# Patient Record
Sex: FEMALE | Race: WHITE | NOT HISPANIC OR LATINO
[De-identification: names, ages, dates, MRNs, and addresses within clinical notes are randomized per-mention and may not be internally consistent; named-entity substitution may affect disease eponyms.]

---

## 2017-10-23 ENCOUNTER — APPOINTMENT (OUTPATIENT)
Dept: ORTHOPEDIC SURGERY | Facility: CLINIC | Age: 67
End: 2017-10-23
Payer: MEDICARE

## 2017-10-23 PROCEDURE — 73502 X-RAY EXAM HIP UNI 2-3 VIEWS: CPT | Mod: LT

## 2017-10-23 PROCEDURE — 99214 OFFICE O/P EST MOD 30 MIN: CPT

## 2018-01-02 ENCOUNTER — APPOINTMENT (OUTPATIENT)
Dept: ORTHOPEDIC SURGERY | Facility: CLINIC | Age: 68
End: 2018-01-02
Payer: MEDICARE

## 2018-01-02 VITALS — WEIGHT: 150 LBS | HEIGHT: 65.5 IN | BODY MASS INDEX: 24.69 KG/M2

## 2018-01-02 DIAGNOSIS — G31.84 PARKINSON'S DISEASE: ICD-10-CM

## 2018-01-02 DIAGNOSIS — M25.559 PAIN IN UNSPECIFIED HIP: ICD-10-CM

## 2018-01-02 DIAGNOSIS — G20 PARKINSON'S DISEASE: ICD-10-CM

## 2018-01-02 PROCEDURE — 99213 OFFICE O/P EST LOW 20 MIN: CPT

## 2018-02-08 ENCOUNTER — OUTPATIENT (OUTPATIENT)
Dept: OUTPATIENT SERVICES | Facility: HOSPITAL | Age: 68
LOS: 1 days | End: 2018-02-08
Payer: MEDICARE

## 2018-02-08 ENCOUNTER — TRANSCRIPTION ENCOUNTER (OUTPATIENT)
Age: 68
End: 2018-02-08

## 2018-02-08 ENCOUNTER — RESULT REVIEW (OUTPATIENT)
Age: 68
End: 2018-02-08

## 2018-02-08 DIAGNOSIS — K94.20 GASTROSTOMY COMPLICATION, UNSPECIFIED: ICD-10-CM

## 2018-02-08 PROCEDURE — 88312 SPECIAL STAINS GROUP 1: CPT

## 2018-02-08 PROCEDURE — 43239 EGD BIOPSY SINGLE/MULTIPLE: CPT

## 2018-02-08 PROCEDURE — 88312 SPECIAL STAINS GROUP 1: CPT | Mod: 26

## 2018-02-08 PROCEDURE — 88305 TISSUE EXAM BY PATHOLOGIST: CPT | Mod: 26

## 2018-02-08 PROCEDURE — 88305 TISSUE EXAM BY PATHOLOGIST: CPT

## 2018-02-12 LAB — SURGICAL PATHOLOGY FINAL REPORT - CH: SIGNIFICANT CHANGE UP

## 2018-03-28 ENCOUNTER — APPOINTMENT (OUTPATIENT)
Dept: ORTHOPEDIC SURGERY | Facility: CLINIC | Age: 68
End: 2018-03-28
Payer: MEDICARE

## 2018-03-28 VITALS — HEIGHT: 65.5 IN | BODY MASS INDEX: 24.69 KG/M2 | WEIGHT: 150 LBS

## 2018-03-28 PROCEDURE — 99213 OFFICE O/P EST LOW 20 MIN: CPT

## 2018-05-07 ENCOUNTER — APPOINTMENT (OUTPATIENT)
Dept: ORTHOPEDIC SURGERY | Facility: CLINIC | Age: 68
End: 2018-05-07
Payer: MEDICARE

## 2018-05-07 PROCEDURE — 73502 X-RAY EXAM HIP UNI 2-3 VIEWS: CPT | Mod: LT

## 2018-05-07 PROCEDURE — 99213 OFFICE O/P EST LOW 20 MIN: CPT

## 2018-06-04 ENCOUNTER — OUTPATIENT (OUTPATIENT)
Dept: OUTPATIENT SERVICES | Facility: HOSPITAL | Age: 68
LOS: 1 days | End: 2018-06-04
Payer: MEDICARE

## 2018-06-04 VITALS
OXYGEN SATURATION: 96 % | DIASTOLIC BLOOD PRESSURE: 74 MMHG | TEMPERATURE: 99 F | HEART RATE: 83 BPM | SYSTOLIC BLOOD PRESSURE: 139 MMHG | HEIGHT: 65 IN | RESPIRATION RATE: 15 BRPM | WEIGHT: 160.06 LBS

## 2018-06-04 DIAGNOSIS — Z96.89 PRESENCE OF OTHER SPECIFIED FUNCTIONAL IMPLANTS: Chronic | ICD-10-CM

## 2018-06-04 DIAGNOSIS — Z01.818 ENCOUNTER FOR OTHER PREPROCEDURAL EXAMINATION: ICD-10-CM

## 2018-06-04 DIAGNOSIS — Z98.890 OTHER SPECIFIED POSTPROCEDURAL STATES: Chronic | ICD-10-CM

## 2018-06-04 DIAGNOSIS — G20 PARKINSON'S DISEASE: ICD-10-CM

## 2018-06-04 DIAGNOSIS — M16.12 UNILATERAL PRIMARY OSTEOARTHRITIS, LEFT HIP: ICD-10-CM

## 2018-06-04 DIAGNOSIS — Z29.9 ENCOUNTER FOR PROPHYLACTIC MEASURES, UNSPECIFIED: ICD-10-CM

## 2018-06-04 DIAGNOSIS — C44.91 BASAL CELL CARCINOMA OF SKIN, UNSPECIFIED: Chronic | ICD-10-CM

## 2018-06-04 DIAGNOSIS — Z98.891 HISTORY OF UTERINE SCAR FROM PREVIOUS SURGERY: Chronic | ICD-10-CM

## 2018-06-04 DIAGNOSIS — K46.9 UNSPECIFIED ABDOMINAL HERNIA WITHOUT OBSTRUCTION OR GANGRENE: Chronic | ICD-10-CM

## 2018-06-04 LAB
ANION GAP SERPL CALC-SCNC: 14 MMOL/L — SIGNIFICANT CHANGE UP (ref 5–17)
BLD GP AB SCN SERPL QL: NEGATIVE — SIGNIFICANT CHANGE UP
BUN SERPL-MCNC: 14 MG/DL — SIGNIFICANT CHANGE UP (ref 7–23)
CALCIUM SERPL-MCNC: 9.3 MG/DL — SIGNIFICANT CHANGE UP (ref 8.4–10.5)
CHLORIDE SERPL-SCNC: 103 MMOL/L — SIGNIFICANT CHANGE UP (ref 96–108)
CO2 SERPL-SCNC: 23 MMOL/L — SIGNIFICANT CHANGE UP (ref 22–31)
CREAT SERPL-MCNC: 0.38 MG/DL — LOW (ref 0.5–1.3)
GLUCOSE SERPL-MCNC: 94 MG/DL — SIGNIFICANT CHANGE UP (ref 70–99)
HBA1C BLD-MCNC: 5.7 % — HIGH (ref 4–5.6)
HCT VFR BLD CALC: 41.9 % — SIGNIFICANT CHANGE UP (ref 34.5–45)
HGB BLD-MCNC: 13.4 G/DL — SIGNIFICANT CHANGE UP (ref 11.5–15.5)
MCHC RBC-ENTMCNC: 26.5 PG — LOW (ref 27–34)
MCHC RBC-ENTMCNC: 32 GM/DL — SIGNIFICANT CHANGE UP (ref 32–36)
MCV RBC AUTO: 82.8 FL — SIGNIFICANT CHANGE UP (ref 80–100)
MRSA PCR RESULT.: SIGNIFICANT CHANGE UP
PLATELET # BLD AUTO: 254 K/UL — SIGNIFICANT CHANGE UP (ref 150–400)
POTASSIUM SERPL-MCNC: 3.9 MMOL/L — SIGNIFICANT CHANGE UP (ref 3.5–5.3)
POTASSIUM SERPL-SCNC: 3.9 MMOL/L — SIGNIFICANT CHANGE UP (ref 3.5–5.3)
RBC # BLD: 5.06 M/UL — SIGNIFICANT CHANGE UP (ref 3.8–5.2)
RBC # FLD: 15.3 % — HIGH (ref 10.3–14.5)
RH IG SCN BLD-IMP: POSITIVE — SIGNIFICANT CHANGE UP
S AUREUS DNA NOSE QL NAA+PROBE: SIGNIFICANT CHANGE UP
SODIUM SERPL-SCNC: 140 MMOL/L — SIGNIFICANT CHANGE UP (ref 135–145)
WBC # BLD: 7.7 K/UL — SIGNIFICANT CHANGE UP (ref 3.8–10.5)
WBC # FLD AUTO: 7.7 K/UL — SIGNIFICANT CHANGE UP (ref 3.8–10.5)

## 2018-06-04 PROCEDURE — G0463: CPT

## 2018-06-04 PROCEDURE — 86901 BLOOD TYPING SEROLOGIC RH(D): CPT

## 2018-06-04 PROCEDURE — 83036 HEMOGLOBIN GLYCOSYLATED A1C: CPT

## 2018-06-04 PROCEDURE — 86900 BLOOD TYPING SEROLOGIC ABO: CPT

## 2018-06-04 PROCEDURE — 80048 BASIC METABOLIC PNL TOTAL CA: CPT

## 2018-06-04 PROCEDURE — 85027 COMPLETE CBC AUTOMATED: CPT

## 2018-06-04 PROCEDURE — 87640 STAPH A DNA AMP PROBE: CPT

## 2018-06-04 PROCEDURE — 86850 RBC ANTIBODY SCREEN: CPT

## 2018-06-04 PROCEDURE — 87641 MR-STAPH DNA AMP PROBE: CPT

## 2018-06-04 RX ORDER — PANTOPRAZOLE SODIUM 20 MG/1
40 TABLET, DELAYED RELEASE ORAL ONCE
Qty: 0 | Refills: 0 | Status: COMPLETED | OUTPATIENT
Start: 2018-06-21 | End: 2018-06-21

## 2018-06-04 RX ORDER — TRAMADOL HYDROCHLORIDE 50 MG/1
50 TABLET ORAL ONCE
Qty: 0 | Refills: 0 | Status: DISCONTINUED | OUTPATIENT
Start: 2018-06-21 | End: 2018-06-21

## 2018-06-04 RX ORDER — SODIUM CHLORIDE 9 MG/ML
3 INJECTION INTRAMUSCULAR; INTRAVENOUS; SUBCUTANEOUS EVERY 8 HOURS
Qty: 0 | Refills: 0 | Status: DISCONTINUED | OUTPATIENT
Start: 2018-06-21 | End: 2018-06-21

## 2018-06-04 RX ORDER — ACETAMINOPHEN 500 MG
975 TABLET ORAL ONCE
Qty: 0 | Refills: 0 | Status: COMPLETED | OUTPATIENT
Start: 2018-06-21 | End: 2018-06-21

## 2018-06-04 RX ORDER — LIDOCAINE HCL 20 MG/ML
0.2 VIAL (ML) INJECTION ONCE
Qty: 0 | Refills: 0 | Status: DISCONTINUED | OUTPATIENT
Start: 2018-06-21 | End: 2018-06-21

## 2018-06-04 RX ORDER — VANCOMYCIN HCL 1 G
1000 VIAL (EA) INTRAVENOUS ONCE
Qty: 0 | Refills: 0 | Status: DISCONTINUED | OUTPATIENT
Start: 2018-06-21 | End: 2018-06-25

## 2018-06-04 RX ORDER — GABAPENTIN 400 MG/1
100 CAPSULE ORAL ONCE
Qty: 0 | Refills: 0 | Status: COMPLETED | OUTPATIENT
Start: 2018-06-21 | End: 2018-06-21

## 2018-06-04 NOTE — H&P PST ADULT - PROBLEM SELECTOR PLAN 2
Pt has Duopa medication with continuous infusion via J-tube at 2.4ml/hr. Pt reports taking bolus of 5.3ml in am. Pt instructed to take remote for DBS on DOS. Pt has an intra-abdominal Duopa pump with continuous infusion via J-tube at 2.4ml/hr. Pt reports taking bolus of 5.3ml in am. Pt instructed to take remote for DBS on DOS.

## 2018-06-04 NOTE — H&P PST ADULT - ASSESSMENT
CAPRINI SCORE [CLOT]    AGE RELATED RISK FACTORS                                                       MOBILITY RELATED FACTORS  [ ] Age 41-60 years                                            (1 Point)                  [ ] Bed rest                                                        (1 Point)  [x] Age: 61-74 years                                           (2 Points)                 [ ] Plaster cast                                                   (2 Points)  [ ] Age= 75 years                                              (3 Points)                 [ ] Bed bound for more than 72 hours                 (2 Points)    DISEASE RELATED RISK FACTORS                                               GENDER SPECIFIC FACTORS  [ ] Edema in the lower extremities                       (1 Point)                  [ ] Pregnancy                                                     (1 Point)  [ ] Varicose veins                                               (1 Point)                  [ ] Post-partum < 6 weeks                                   (1 Point)             [x] BMI > 25 Kg/m2                                            (1 Point)                  [ ] Hormonal therapy  or oral contraception          (1 Point)                 [ ] Sepsis (in the previous month)                        (1 Point)                  [ ] History of pregnancy complications                 (1 point)  [ ] Pneumonia or serious lung disease                                               [ ] Unexplained or recurrent                     (1 Point)           (in the previous month)                               (1 Point)  [ ] Abnormal pulmonary function test                     (1 Point)                 SURGERY RELATED RISK FACTORS  [ ] Acute myocardial infarction                              (1 Point)                 [ ]  Section                                             (1 Point)  [ ] Congestive heart failure (in the previous month)  (1 Point)               [ ] Minor surgery                                                  (1 Point)   [ ] Inflammatory bowel disease                             (1 Point)                 [ ] Arthroscopic surgery                                        (2 Points)  [ ] Central venous access                                      (2 Points)                [x] General surgery lasting more than 45 minutes   (2 Points)       [ ] Stroke (in the previous month)                          (5 Points)               [ ] Elective arthroplasty                                         (5 Points)                                                                                                                                               HEMATOLOGY RELATED FACTORS                                                 TRAUMA RELATED RISK FACTORS  [ ] Prior episodes of VTE                                     (3 Points)                 [ ] Fracture of the hip, pelvis, or leg                       (5 Points)  [ ] Positive family history for VTE                         (3 Points)                 [ ] Acute spinal cord injury (in the previous month)  (5 Points)  [ ] Prothrombin 18143 A                                     (3 Points)                 [ ] Paralysis  (less than 1 month)                             (5 Points)  [ ] Factor V Leiden                                             (3 Points)                  [ ] Multiple Trauma within 1 month                        (5 Points)  [ ] Lupus anticoagulants                                     (3 Points)                                                           [ ] Anticardiolipin antibodies                               (3 Points)                                                       [ ] High homocysteine in the blood                      (3 Points)                                             [ ] Other congenital or acquired thrombophilia      (3 Points)                                                [ ] Heparin induced thrombocytopenia                  (3 Points)                                          Total Score [    5     ]

## 2018-06-04 NOTE — H&P PST ADULT - ACTIVITY
able to do some light housework, limited due to hip and leg pain, uses walker at home & wheelchair when out

## 2018-06-04 NOTE — H&P PST ADULT - PROBLEM SELECTOR PLAN 1
Scheduled left anterior total hip replacement on 6/21/2018  CBC, BMP, T&S, HgA1c, nasal swab sent at [facility]  Pre-op instructions given to pt/spouse including chlorhexidine soap

## 2018-06-04 NOTE — H&P PST ADULT - PMH
Basal cell carcinoma  scalp  Constipation    Depression    GERD (gastroesophageal reflux disease)    Parkinson's disease    Seasonal allergies    Unilateral primary osteoarthritis, left hip

## 2018-06-04 NOTE — H&P PST ADULT - HISTORY OF PRESENT ILLNESS
67 y/o female with PMHx of Parkinson's disease, 69 y/o female with PMHx of Parkinson's disease (Duopa infusing via J-tube), s/p DBS x3, GERD, depression c/o worsening left hip and thigh pain. Reports instability as she ambulates and requires a walker at home and a wheelchair out of the house. Reports trying medications without much relief. Evaluated by Dr. Stokes, s/p diagnostic testing reveals OA of left hip. Presents to PST for a scheduled LEFT anterior total hip replacement on 6/21/2018.

## 2018-06-04 NOTE — H&P PST ADULT - NSANTHOSAYNRD_GEN_A_CORE
No. JUDAH screening performed.  STOP BANG Legend: 0-2 = LOW Risk; 3-4 = INTERMEDIATE Risk; 5-8 = HIGH Risk

## 2018-06-04 NOTE — H&P PST ADULT - PSH
Abdominal hernia  with mesh, 2003  Basal cell carcinoma  removal, mid vertex scalp, 2002  H/O colonoscopy  , 2008 polypectomy, ,   H/O ovarian cystectomy  right,   History of   10/1982  S/P deep brain stimulator placement  2006, 2 brain pacemakers Model #[id number], batteries replaced 2014

## 2018-06-04 NOTE — H&P PST ADULT - PROBLEM SELECTOR PLAN 3
The Caprini score indicates this patient is at risk for a VTE event (score 3-5).  Most surgical patients in this group would benefit from pharmacologic prophylaxis. The surgical team will determine the balance between VTE risk and bleeding risk

## 2018-06-20 ENCOUNTER — TRANSCRIPTION ENCOUNTER (OUTPATIENT)
Age: 68
End: 2018-06-20

## 2018-06-21 ENCOUNTER — INPATIENT (INPATIENT)
Facility: HOSPITAL | Age: 68
LOS: 3 days | Discharge: HOME | DRG: 470 | End: 2018-06-25
Attending: ORTHOPAEDIC SURGERY | Admitting: ORTHOPAEDIC SURGERY
Payer: MEDICARE

## 2018-06-21 ENCOUNTER — APPOINTMENT (OUTPATIENT)
Dept: ORTHOPEDIC SURGERY | Facility: HOSPITAL | Age: 68
End: 2018-06-21

## 2018-06-21 VITALS
TEMPERATURE: 98 F | HEART RATE: 82 BPM | SYSTOLIC BLOOD PRESSURE: 123 MMHG | RESPIRATION RATE: 18 BRPM | OXYGEN SATURATION: 98 % | WEIGHT: 160.06 LBS | DIASTOLIC BLOOD PRESSURE: 88 MMHG | HEIGHT: 65 IN

## 2018-06-21 DIAGNOSIS — Z96.89 PRESENCE OF OTHER SPECIFIED FUNCTIONAL IMPLANTS: Chronic | ICD-10-CM

## 2018-06-21 DIAGNOSIS — Z98.891 HISTORY OF UTERINE SCAR FROM PREVIOUS SURGERY: Chronic | ICD-10-CM

## 2018-06-21 DIAGNOSIS — M16.12 UNILATERAL PRIMARY OSTEOARTHRITIS, LEFT HIP: ICD-10-CM

## 2018-06-21 DIAGNOSIS — Z98.890 OTHER SPECIFIED POSTPROCEDURAL STATES: Chronic | ICD-10-CM

## 2018-06-21 DIAGNOSIS — K46.9 UNSPECIFIED ABDOMINAL HERNIA WITHOUT OBSTRUCTION OR GANGRENE: Chronic | ICD-10-CM

## 2018-06-21 DIAGNOSIS — C44.91 BASAL CELL CARCINOMA OF SKIN, UNSPECIFIED: Chronic | ICD-10-CM

## 2018-06-21 LAB
HCT VFR BLD CALC: 36 % — SIGNIFICANT CHANGE UP (ref 34.5–45)
HGB BLD-MCNC: 11.9 G/DL — SIGNIFICANT CHANGE UP (ref 11.5–15.5)
MCHC RBC-ENTMCNC: 27.7 PG — SIGNIFICANT CHANGE UP (ref 27–34)
MCHC RBC-ENTMCNC: 33.2 GM/DL — SIGNIFICANT CHANGE UP (ref 32–36)
MCV RBC AUTO: 83.4 FL — SIGNIFICANT CHANGE UP (ref 80–100)
PLATELET # BLD AUTO: 215 K/UL — SIGNIFICANT CHANGE UP (ref 150–400)
RBC # BLD: 4.31 M/UL — SIGNIFICANT CHANGE UP (ref 3.8–5.2)
RBC # FLD: 13.8 % — SIGNIFICANT CHANGE UP (ref 10.3–14.5)
WBC # BLD: 13.2 K/UL — HIGH (ref 3.8–10.5)
WBC # FLD AUTO: 13.2 K/UL — HIGH (ref 3.8–10.5)

## 2018-06-21 PROCEDURE — 72170 X-RAY EXAM OF PELVIS: CPT | Mod: 26

## 2018-06-21 PROCEDURE — 27130 TOTAL HIP ARTHROPLASTY: CPT | Mod: LT

## 2018-06-21 RX ORDER — PANTOPRAZOLE SODIUM 20 MG/1
40 TABLET, DELAYED RELEASE ORAL DAILY
Qty: 0 | Refills: 0 | Status: DISCONTINUED | OUTPATIENT
Start: 2018-06-21 | End: 2018-06-25

## 2018-06-21 RX ORDER — POLYETHYLENE GLYCOL 3350 17 G/17G
17 POWDER, FOR SOLUTION ORAL DAILY
Qty: 0 | Refills: 0 | Status: DISCONTINUED | OUTPATIENT
Start: 2018-06-21 | End: 2018-06-25

## 2018-06-21 RX ORDER — CELECOXIB 200 MG/1
200 CAPSULE ORAL AT BEDTIME
Qty: 0 | Refills: 0 | Status: DISCONTINUED | OUTPATIENT
Start: 2018-06-23 | End: 2018-06-25

## 2018-06-21 RX ORDER — OXYCODONE HYDROCHLORIDE 5 MG/1
5 TABLET ORAL EVERY 4 HOURS
Qty: 0 | Refills: 0 | Status: DISCONTINUED | OUTPATIENT
Start: 2018-06-21 | End: 2018-06-25

## 2018-06-21 RX ORDER — MAGNESIUM HYDROXIDE 400 MG/1
30 TABLET, CHEWABLE ORAL DAILY
Qty: 0 | Refills: 0 | Status: DISCONTINUED | OUTPATIENT
Start: 2018-06-21 | End: 2018-06-25

## 2018-06-21 RX ORDER — SODIUM CHLORIDE 9 MG/ML
500 INJECTION, SOLUTION INTRAVENOUS ONCE
Qty: 0 | Refills: 0 | Status: COMPLETED | OUTPATIENT
Start: 2018-06-22 | End: 2018-06-22

## 2018-06-21 RX ORDER — ACETAMINOPHEN 500 MG
975 TABLET ORAL EVERY 8 HOURS
Qty: 0 | Refills: 0 | Status: DISCONTINUED | OUTPATIENT
Start: 2018-06-21 | End: 2018-06-25

## 2018-06-21 RX ORDER — HYDROMORPHONE HYDROCHLORIDE 2 MG/ML
0.2 INJECTION INTRAMUSCULAR; INTRAVENOUS; SUBCUTANEOUS
Qty: 0 | Refills: 0 | Status: DISCONTINUED | OUTPATIENT
Start: 2018-06-21 | End: 2018-06-25

## 2018-06-21 RX ORDER — ONDANSETRON 8 MG/1
4 TABLET, FILM COATED ORAL ONCE
Qty: 0 | Refills: 0 | Status: DISCONTINUED | OUTPATIENT
Start: 2018-06-21 | End: 2018-06-21

## 2018-06-21 RX ORDER — CEFAZOLIN SODIUM 1 G
2000 VIAL (EA) INJECTION EVERY 8 HOURS
Qty: 0 | Refills: 0 | Status: COMPLETED | OUTPATIENT
Start: 2018-06-21 | End: 2018-06-22

## 2018-06-21 RX ORDER — OXYCODONE HYDROCHLORIDE 5 MG/1
10 TABLET ORAL EVERY 4 HOURS
Qty: 0 | Refills: 0 | Status: DISCONTINUED | OUTPATIENT
Start: 2018-06-21 | End: 2018-06-25

## 2018-06-21 RX ORDER — DOCUSATE SODIUM 100 MG
100 CAPSULE ORAL THREE TIMES A DAY
Qty: 0 | Refills: 0 | Status: DISCONTINUED | OUTPATIENT
Start: 2018-06-21 | End: 2018-06-24

## 2018-06-21 RX ORDER — KETOROLAC TROMETHAMINE 30 MG/ML
30 SYRINGE (ML) INJECTION EVERY 8 HOURS
Qty: 0 | Refills: 0 | Status: DISCONTINUED | OUTPATIENT
Start: 2018-06-21 | End: 2018-06-23

## 2018-06-21 RX ORDER — DEXAMETHASONE 0.5 MG/5ML
8 ELIXIR ORAL ONCE
Qty: 0 | Refills: 0 | Status: COMPLETED | OUTPATIENT
Start: 2018-06-22 | End: 2018-06-22

## 2018-06-21 RX ORDER — SENNA PLUS 8.6 MG/1
2 TABLET ORAL AT BEDTIME
Qty: 0 | Refills: 0 | Status: DISCONTINUED | OUTPATIENT
Start: 2018-06-21 | End: 2018-06-24

## 2018-06-21 RX ORDER — SODIUM CHLORIDE 9 MG/ML
500 INJECTION INTRAMUSCULAR; INTRAVENOUS; SUBCUTANEOUS ONCE
Qty: 0 | Refills: 0 | Status: DISCONTINUED | OUTPATIENT
Start: 2018-06-21 | End: 2018-06-21

## 2018-06-21 RX ORDER — TRAMADOL HYDROCHLORIDE 50 MG/1
50 TABLET ORAL EVERY 6 HOURS
Qty: 0 | Refills: 0 | Status: DISCONTINUED | OUTPATIENT
Start: 2018-06-21 | End: 2018-06-25

## 2018-06-21 RX ORDER — ONDANSETRON 8 MG/1
4 TABLET, FILM COATED ORAL ONCE
Qty: 0 | Refills: 0 | Status: DISCONTINUED | OUTPATIENT
Start: 2018-06-21 | End: 2018-06-25

## 2018-06-21 RX ORDER — HYDROMORPHONE HYDROCHLORIDE 2 MG/ML
0.2 INJECTION INTRAMUSCULAR; INTRAVENOUS; SUBCUTANEOUS
Qty: 0 | Refills: 0 | Status: DISCONTINUED | OUTPATIENT
Start: 2018-06-21 | End: 2018-06-21

## 2018-06-21 RX ORDER — SODIUM CHLORIDE 9 MG/ML
500 INJECTION, SOLUTION INTRAVENOUS ONCE
Qty: 0 | Refills: 0 | Status: COMPLETED | OUTPATIENT
Start: 2018-06-21 | End: 2018-06-21

## 2018-06-21 RX ORDER — ONDANSETRON 8 MG/1
4 TABLET, FILM COATED ORAL EVERY 6 HOURS
Qty: 0 | Refills: 0 | Status: DISCONTINUED | OUTPATIENT
Start: 2018-06-21 | End: 2018-06-25

## 2018-06-21 RX ORDER — ASPIRIN/CALCIUM CARB/MAGNESIUM 324 MG
325 TABLET ORAL
Qty: 0 | Refills: 0 | Status: DISCONTINUED | OUTPATIENT
Start: 2018-06-21 | End: 2018-06-25

## 2018-06-21 RX ORDER — CARBIDOPA AND LEVODOPA 25; 100 MG/1; MG/1
1 TABLET ORAL THREE TIMES A DAY
Qty: 0 | Refills: 0 | Status: DISCONTINUED | OUTPATIENT
Start: 2018-06-21 | End: 2018-06-25

## 2018-06-21 RX ORDER — SERTRALINE 25 MG/1
200 TABLET, FILM COATED ORAL DAILY
Qty: 0 | Refills: 0 | Status: DISCONTINUED | OUTPATIENT
Start: 2018-06-21 | End: 2018-06-25

## 2018-06-21 RX ORDER — SODIUM CHLORIDE 9 MG/ML
1000 INJECTION, SOLUTION INTRAVENOUS
Qty: 0 | Refills: 0 | Status: DISCONTINUED | OUTPATIENT
Start: 2018-06-21 | End: 2018-06-23

## 2018-06-21 RX ORDER — ALBUTEROL 90 UG/1
2 AEROSOL, METERED ORAL EVERY 6 HOURS
Qty: 0 | Refills: 0 | Status: DISCONTINUED | OUTPATIENT
Start: 2018-06-21 | End: 2018-06-25

## 2018-06-21 RX ORDER — MIRTAZAPINE 45 MG/1
30 TABLET, ORALLY DISINTEGRATING ORAL AT BEDTIME
Qty: 0 | Refills: 0 | Status: DISCONTINUED | OUTPATIENT
Start: 2018-06-21 | End: 2018-06-25

## 2018-06-21 RX ORDER — SODIUM CHLORIDE 9 MG/ML
1000 INJECTION, SOLUTION INTRAVENOUS ONCE
Qty: 0 | Refills: 0 | Status: COMPLETED | OUTPATIENT
Start: 2018-06-21 | End: 2018-06-21

## 2018-06-21 RX ORDER — ACETAMINOPHEN 500 MG
650 TABLET ORAL EVERY 6 HOURS
Qty: 0 | Refills: 0 | Status: DISCONTINUED | OUTPATIENT
Start: 2018-06-21 | End: 2018-06-25

## 2018-06-21 RX ORDER — ACETAMINOPHEN 500 MG
1000 TABLET ORAL ONCE
Qty: 0 | Refills: 0 | Status: DISCONTINUED | OUTPATIENT
Start: 2018-06-21 | End: 2018-06-25

## 2018-06-21 RX ADMIN — Medication 100 MILLIGRAM(S): at 22:31

## 2018-06-21 RX ADMIN — GABAPENTIN 100 MILLIGRAM(S): 400 CAPSULE ORAL at 10:40

## 2018-06-21 RX ADMIN — SODIUM CHLORIDE 125 MILLILITER(S): 9 INJECTION, SOLUTION INTRAVENOUS at 17:30

## 2018-06-21 RX ADMIN — PANTOPRAZOLE SODIUM 40 MILLIGRAM(S): 20 TABLET, DELAYED RELEASE ORAL at 10:40

## 2018-06-21 RX ADMIN — Medication 30 MILLIGRAM(S): at 22:31

## 2018-06-21 RX ADMIN — OXYCODONE HYDROCHLORIDE 5 MILLIGRAM(S): 5 TABLET ORAL at 17:59

## 2018-06-21 RX ADMIN — Medication 325 MILLIGRAM(S): at 19:30

## 2018-06-21 RX ADMIN — Medication 975 MILLIGRAM(S): at 10:40

## 2018-06-21 RX ADMIN — Medication 975 MILLIGRAM(S): at 11:20

## 2018-06-21 RX ADMIN — SODIUM CHLORIDE 1000 MILLILITER(S): 9 INJECTION, SOLUTION INTRAVENOUS at 20:57

## 2018-06-21 RX ADMIN — SODIUM CHLORIDE 1000 MILLILITER(S): 9 INJECTION, SOLUTION INTRAVENOUS at 17:06

## 2018-06-21 RX ADMIN — MIRTAZAPINE 30 MILLIGRAM(S): 45 TABLET, ORALLY DISINTEGRATING ORAL at 22:31

## 2018-06-21 RX ADMIN — OXYCODONE HYDROCHLORIDE 5 MILLIGRAM(S): 5 TABLET ORAL at 18:29

## 2018-06-21 RX ADMIN — TRAMADOL HYDROCHLORIDE 50 MILLIGRAM(S): 50 TABLET ORAL at 11:37

## 2018-06-21 NOTE — CHART NOTE - NSCHARTNOTEFT_GEN_A_CORE
06-21-18 @ 16:38    Pt comfortable.  Pain well controlled.  No chest pain, no SOB, no N/V.    T(C): 36 (06-21-18 @ 15:55), Max: 36.5 (06-21-18 @ 09:52)  HR: 92 (06-21-18 @ 16:30) (82 - 99)  BP: 108/55 (06-21-18 @ 16:30) (94/66 - 123/88)  RR: 15 (06-21-18 @ 16:30) (15 - 18)  SpO2: 92% (06-21-18 @ 16:30) (90% - 98%)    Left hip dressing clean/dry/intact.  +DF/PF.  +Distal Pulses.   Motor/Sensory function grossly intact.  Calves soft/NT with venodynes  intact.      Postop xrays show good hardware alignment.    Pt s/p L SLIME  -Analgesia  -Cont to Monitor  -DVT Prophylaxis - Aspirin    JB Dowell PA-C  Orthopaedic Surgery  [id number] 06-21-18 @ 16:38    Pt comfortable.  Pain well controlled.  No chest pain, no SOB, no N/V.    T(C): 36 (06-21-18 @ 15:55), Max: 36.5 (06-21-18 @ 09:52)  HR: 92 (06-21-18 @ 16:30) (82 - 99)  BP: 108/55 (06-21-18 @ 16:30) (94/66 - 123/88)  RR: 15 (06-21-18 @ 16:30) (15 - 18)  SpO2: 92% (06-21-18 @ 16:30) (90% - 98%)    Left hip dressing clean/dry/intact.  +DF/PF.  +Distal Pulses.   Motor/Sensory function grossly intact.  Calves soft/NT with venodynes  intact.      Postop xrays show good hardware alignment.    Pt s/p L SLIME  -Analgesia  -Cont to Monitor  -DVT Prophylaxis - Aspirin    JB Dowell PA-C  Orthopaedic Surgery  [id number]    I agree with the above note and have personally seen and examined this patient. All pertinent films have been reviewed. Please refer to clinical documentation of the history, physical examinations, data summary, and both assessment and plan as documented above and with which I agree.    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

## 2018-06-21 NOTE — BRIEF OPERATIVE NOTE - PROCEDURE
<<-----Click on this checkbox to enter Procedure Total hip arthroplasty by direct anterior approach  06/21/2018    Active  [facility]

## 2018-06-21 NOTE — PATIENT PROFILE ADULT. - PSH
Abdominal hernia  with mesh, 2003  Basal cell carcinoma  removal, mid vertex scalp, 2002  H/O colonoscopy  , 2008 polypectomy, ,   H/O ovarian cystectomy  right,   History of   10/1982  S/P deep brain stimulator placement  2006, 2 brain pacemakers Model #[id number], batteries replaced 2014

## 2018-06-22 ENCOUNTER — TRANSCRIPTION ENCOUNTER (OUTPATIENT)
Age: 68
End: 2018-06-22

## 2018-06-22 LAB
ANION GAP SERPL CALC-SCNC: 11 MMOL/L — SIGNIFICANT CHANGE UP (ref 5–17)
BUN SERPL-MCNC: 16 MG/DL — SIGNIFICANT CHANGE UP (ref 7–23)
CALCIUM SERPL-MCNC: 7.8 MG/DL — LOW (ref 8.4–10.5)
CHLORIDE SERPL-SCNC: 104 MMOL/L — SIGNIFICANT CHANGE UP (ref 96–108)
CO2 SERPL-SCNC: 24 MMOL/L — SIGNIFICANT CHANGE UP (ref 22–31)
CREAT SERPL-MCNC: 0.49 MG/DL — LOW (ref 0.5–1.3)
GLUCOSE SERPL-MCNC: 130 MG/DL — HIGH (ref 70–99)
HCT VFR BLD CALC: 27 % — LOW (ref 34.5–45)
HCT VFR BLD CALC: 29.3 % — LOW (ref 34.5–45)
HGB BLD-MCNC: 8.8 G/DL — LOW (ref 11.5–15.5)
HGB BLD-MCNC: 9.8 G/DL — LOW (ref 11.5–15.5)
MCHC RBC-ENTMCNC: 27 PG — SIGNIFICANT CHANGE UP (ref 27–34)
MCHC RBC-ENTMCNC: 28 PG — SIGNIFICANT CHANGE UP (ref 27–34)
MCHC RBC-ENTMCNC: 32.6 GM/DL — SIGNIFICANT CHANGE UP (ref 32–36)
MCHC RBC-ENTMCNC: 33.5 GM/DL — SIGNIFICANT CHANGE UP (ref 32–36)
MCV RBC AUTO: 82.8 FL — SIGNIFICANT CHANGE UP (ref 80–100)
MCV RBC AUTO: 83.4 FL — SIGNIFICANT CHANGE UP (ref 80–100)
PLATELET # BLD AUTO: 172 K/UL — SIGNIFICANT CHANGE UP (ref 150–400)
PLATELET # BLD AUTO: 192 K/UL — SIGNIFICANT CHANGE UP (ref 150–400)
POTASSIUM SERPL-MCNC: 3.7 MMOL/L — SIGNIFICANT CHANGE UP (ref 3.5–5.3)
POTASSIUM SERPL-SCNC: 3.7 MMOL/L — SIGNIFICANT CHANGE UP (ref 3.5–5.3)
RBC # BLD: 3.26 M/UL — LOW (ref 3.8–5.2)
RBC # BLD: 3.52 M/UL — LOW (ref 3.8–5.2)
RBC # FLD: 13.7 % — SIGNIFICANT CHANGE UP (ref 10.3–14.5)
RBC # FLD: 15.7 % — HIGH (ref 10.3–14.5)
SODIUM SERPL-SCNC: 139 MMOL/L — SIGNIFICANT CHANGE UP (ref 135–145)
WBC # BLD: 6.81 K/UL — SIGNIFICANT CHANGE UP (ref 3.8–10.5)
WBC # BLD: 8.3 K/UL — SIGNIFICANT CHANGE UP (ref 3.8–10.5)
WBC # FLD AUTO: 6.81 K/UL — SIGNIFICANT CHANGE UP (ref 3.8–10.5)
WBC # FLD AUTO: 8.3 K/UL — SIGNIFICANT CHANGE UP (ref 3.8–10.5)

## 2018-06-22 RX ORDER — SODIUM CHLORIDE 9 MG/ML
1000 INJECTION INTRAMUSCULAR; INTRAVENOUS; SUBCUTANEOUS ONCE
Qty: 0 | Refills: 0 | Status: COMPLETED | OUTPATIENT
Start: 2018-06-22 | End: 2018-06-22

## 2018-06-22 RX ADMIN — TRAMADOL HYDROCHLORIDE 50 MILLIGRAM(S): 50 TABLET ORAL at 10:00

## 2018-06-22 RX ADMIN — MIRTAZAPINE 30 MILLIGRAM(S): 45 TABLET, ORALLY DISINTEGRATING ORAL at 22:03

## 2018-06-22 RX ADMIN — SERTRALINE 200 MILLIGRAM(S): 25 TABLET, FILM COATED ORAL at 12:06

## 2018-06-22 RX ADMIN — Medication 30 MILLIGRAM(S): at 17:00

## 2018-06-22 RX ADMIN — Medication 100 MILLIGRAM(S): at 09:05

## 2018-06-22 RX ADMIN — PANTOPRAZOLE SODIUM 40 MILLIGRAM(S): 20 TABLET, DELAYED RELEASE ORAL at 12:06

## 2018-06-22 RX ADMIN — Medication 100 MILLIGRAM(S): at 06:29

## 2018-06-22 RX ADMIN — Medication 100 MILLIGRAM(S): at 16:12

## 2018-06-22 RX ADMIN — Medication 30 MILLIGRAM(S): at 06:29

## 2018-06-22 RX ADMIN — Medication 30 MILLIGRAM(S): at 16:12

## 2018-06-22 RX ADMIN — SODIUM CHLORIDE 2000 MILLILITER(S): 9 INJECTION INTRAMUSCULAR; INTRAVENOUS; SUBCUTANEOUS at 02:37

## 2018-06-22 RX ADMIN — Medication 0.5 MILLIGRAM(S): at 12:13

## 2018-06-22 RX ADMIN — Medication 325 MILLIGRAM(S): at 06:29

## 2018-06-22 RX ADMIN — Medication 101.6 MILLIGRAM(S): at 06:30

## 2018-06-22 RX ADMIN — Medication 30 MILLIGRAM(S): at 22:02

## 2018-06-22 RX ADMIN — Medication 100 MILLIGRAM(S): at 22:03

## 2018-06-22 RX ADMIN — SODIUM CHLORIDE 1000 MILLILITER(S): 9 INJECTION, SOLUTION INTRAVENOUS at 06:33

## 2018-06-22 RX ADMIN — Medication 325 MILLIGRAM(S): at 18:54

## 2018-06-22 RX ADMIN — TRAMADOL HYDROCHLORIDE 50 MILLIGRAM(S): 50 TABLET ORAL at 09:04

## 2018-06-22 RX ADMIN — TRAMADOL HYDROCHLORIDE 50 MILLIGRAM(S): 50 TABLET ORAL at 22:03

## 2018-06-22 RX ADMIN — POLYETHYLENE GLYCOL 3350 17 GRAM(S): 17 POWDER, FOR SOLUTION ORAL at 12:07

## 2018-06-22 NOTE — DISCHARGE NOTE ADULT - CARE PLAN
Principal Discharge DX:	Unilateral primary osteoarthritis, left hip  Goal:	surgical intervention should result in improved function  Assessment and plan of treatment:	continue weight bearing as tolerated ambulation,  maintain anterior total hip precautions Principal Discharge DX:	Unilateral primary osteoarthritis, left hip  Goal:	surgical intervention should result in improved function  Assessment and plan of treatment:	continue weight bearing as tolerated ambulation,  maintain anterior total hip precautions  Follow up with Dr. Stokes in 2 weeks.   Dressing: Keep clean and dry. Do not remove until the date specified on dressing.  Bathing: No direct water to dressing. No tub baths.  DVT prophylaxis: Take Aspirin 325mg Oral Twice Daily for 4 weeks.

## 2018-06-22 NOTE — ANESTHESIA FOLLOW-UP NOTE - NSEVALATION_GEN_ALL_CORE
All questions were answered/No apparent complications or complaints regarding anesthesia care at this time

## 2018-06-22 NOTE — PHYSICAL THERAPY INITIAL EVALUATION ADULT - GENERAL OBSERVATIONS, REHAB EVAL
Received in bed, +dressing in L hip, +duodopa pump, +jimenez, +venodynes, +IVLx2, reports 4-5/10 L hip pain but was agreeable to PT; spouse present

## 2018-06-22 NOTE — DISCHARGE NOTE ADULT - MEDICATION SUMMARY - MEDICATIONS TO CHANGE
I will SWITCH the dose or number of times a day I take the medications listed below when I get home from the hospital:    CeleBREX 200 mg oral capsule  -- 1 cap(s) by mouth , As Needed

## 2018-06-22 NOTE — OCCUPATIONAL THERAPY INITIAL EVALUATION ADULT - VISUAL ASSESSMENT: TRACKING
PROGRESS NOTE      Patient: Daniel Mi  MRN: [id number]  SSN: [id number]   YOB: 1992  Age: 30 y.o. Sex: male     Admit Date: 2/27/2018 LOS:  LOS: 0 days      POD # 1:    Date of Procedure: 2/27/2018   Preoperative Diagnosis: RIGHT POSTERIOR TIBIA FX, RIGHT COMMINUTED FIBULA FX, SYNDESMOTIC DISRUPTION                                                                       Postoperative Diagnosis: RIGHT POSTERIOR TIBIA FX, RIGHT COMMINUTED FIBULA FX, SYNDESMOTIC DISRUPTION  Procedure(s):  RIGHT TIBIA EXTERNAL FIXATOR/C-ARM WITH DRAPE  CLOSED REDUCTION RIGHT ANKLE FX  DRAINAGE/BETY LARGE MEDIAL FX BLISTER  Consultants following patients: None    Case discussed with:  [x]Patient  []Family  []Nursing  []Case Management      ASSESSMENT      Daniel Mi is a 30y.o. year old  male who is doing quite well s/p above mentioned surgery. No additional complaints other than surgical incision pain: his pain is controlled at this time. Daniel Mi is Tolerating fluids and solids. Daniel Mi reports: No difficulty voiding urine      Daniel Mi Denies Headache, N/V, CP, SOB, ABD or Calf pain. Activity Level: Up with PT    No overnight events and remains hemodynamically stable after the surgery.       PLAN       1) Orthopaedic Diagnoses:  Right GONZALEZ C ANKLE FX: POSTERIOR TIBIA FX/SYNDESMOTIC DISRUPTION          Dressing changes: B IOPATCHES/STERILE 4 X 4,X, 4 INCH LOOSLELY PLACED KERLIX/4 INCH ACE WRAPS (2)          Wound Care:   [x] None present/ NA  [] Wound Care Consult  [] Wound Vac           Incision Care:  Dressing Change/Reinforce if necessary          DVT Prophylaxis : SCD's, Encourage mobilization, Encourage active ankle     DF/PF motion for endogenous fibrinolysis           Chemo Prophylaxis:  Xarelto DVT Prophylaxis:  []Lovenox  []Hep SQ  []SCDs  []Coumadin   []On Heparin gtt [x] Xarelto    [] Eliquis [] Aspirin          Incentive Spirometry: Encourage its use to minimize atelectasis  2) Pain management -  Continue Current Pain Management  Narcotics   3) Antibiotics: Ancef for 23 hours perioperative dosing prophylaxis for infection. 4) PT/OT/ Intervention/ Consultations:   PT/OT : TAISHA DRISCOLL disposition: Home,     [x]  []  [x] [address] [facility]  5)  Medical Diagnoses: Internal Medicine/Consultants: Not consulted at this time:     Patient Active Problem List   Diagnosis Code    Fracture of ankle, trimalleolar, closed, right, initial encounter S82.851A    Displaced Maisonneuve fracture of right lower extremity S82.861A    Blister of leg S80.829A    Ankle fracture S82.899A     6) Indwelling Catheters:    IV : not present, Drains: not present, Montse: not present  7) Gastrointestinal : Pepcid  Yes, Colace Yes  8) Discharge to home with home health     SUBJECTIVE      No complaints  Denies Headache, N/V, CP, SOB, ABD or Calf pain. OBJECTIVE      Visit Vitals    /82 (BP 1 Location: Right arm, BP Patient Position: At rest)    Pulse (!) 59    Temp 98 °F (36.7 °C)    Resp 18    Ht 6' 3\" (1.905 m)    Wt 175 lb (79.4 kg)    SpO2 100%    BMI 21.87 kg/m2       Intake/Output Summary (Last 24 hours) at 02/28/18 1344  Last data filed at 02/28/18 1136   Gross per 24 hour   Intake              700 ml   Output             1300 ml   Net             -600 ml       Alert, Oriented x 3,no distress,no complaints    CHEST/ABDOMEN: Observation reveals: No audible wheezing from mouth. No accessory use of chest muscles during breathing. Non tender abdomen    Incision/Dressings: External fixator present   Dressings are Mild soilage present   Incision: dressings covering the incision  not assessed)    Extremities:        No embolic phenomena to the toes or hands              No significant edema to the foot and or toes.                 Pulses in tact to the left and right foot             Lower extremities are warm and appear well perfused         DVT: No evidence of DVT seen on examination at this time            Moves lower extremities well (ankle DF/PF bilateral)         Moves Upper extremities well       DIAGNOSTIC LABS OR  IMAGING       All Micro Results     None           CHART REVIEW/ MEDICATIONS/ ALLERGY     Past Medical History:   Diagnosis Date    Hypertension     Seasonal allergic rhinitis      History reviewed. No pertinent surgical history.   No Known Allergies  Current Facility-Administered Medications   Medication Dose Route Frequency Provider Last Rate Last Dose    sodium chloride (NS) flush 5-10 mL  5-10 mL IntraVENous Q8H Sharon Dauhgerty MD   10 mL at 02/28/18 1400    sodium chloride (NS) flush 5-10 mL  5-10 mL IntraVENous PRN Sharon Daugherty MD        naloxone [facility]) injection 0.4 mg  0.4 mg IntraVENous PRN Sharon Daugherty MD        ceFAZolin (ANCEF) 2g IVPB in 50 mL D5W  2 g IntraVENous Q8H Sharon Daugherty  mL/hr at 02/28/18 1009 2 g at 02/28/18 1009    HYDROcodone-acetaminophen (NORCO)  mg tablet 2 Tab  2 Tab Oral Q4H PRN Sharon Daugherty MD   2 Tab at 02/28/18 0209    famotidine (PEPCID) tablet 20 mg  20 mg Oral BID Sharon Daugherty MD   20 mg at 02/28/18 1072    docusate sodium (COLACE) capsule 100 mg  100 mg Oral DAILY Sharon Daugherty MD   100 mg at 02/28/18 [address], PA-C  2/28/2018  1:38 PM normal

## 2018-06-22 NOTE — PHYSICAL THERAPY INITIAL EVALUATION ADULT - PERTINENT HX OF CURRENT PROBLEM, REHAB EVAL
68F with PMHx of Parkinson's disease (Duopa infusing via J-tube), s/p DBS x3, GERD, depression c/o worsening left hip and thigh pain. Reports instability as she ambulates and requires a walker at home & wheelchair out of the house. Reports trying medications without much relief. Evaluated by Dr. Stokes, diagnostic testing reveals OA of left hip. S/P abovementioned procedure.

## 2018-06-22 NOTE — DISCHARGE NOTE ADULT - HOSPITAL COURSE
Reason for Admission	"left hip replacement"	     History of Present Illness:  History of Present Illness		  69 y/o female with PMHx of Parkinson's disease (Duopa infusing via J-tube), s/p DBS x3, GERD, depression c/o worsening left hip and thigh pain. Reports instability as she ambulates and requires a walker at home and a wheelchair out of the house. Reports trying medications without much relief. Evaluated by Dr. Stokes, s/p diagnostic testing reveals OA of left hip. Presents to [facility] for a scheduled LEFT anterior total hip replacement on 2018.    Allergies/Medications:   Home Medications:   * Patient Currently Takes Medications as of 2018 17:40 documented in Structured Notes  · 	Sinemet 25 mg-100 mg oral tablet: Last Dose Taken:  , 1 tab(s) orally 3 times a day, As Needed  · 	Parcopa 25 mg-100 mg oral tablet, disintegrating: Last Dose Taken:  , 1 tab(s) orally 4 times a day, As Needed  · 	Duopa 4.63 mg-20 mg/mL enteral suspension: Last Dose Taken:  , 1 milliliter(s) enteral every hour via J-tube  · 	Zoloft 100 mg oral tablet: Last Dose Taken:  , 2 tab(s) orally once a day  · 	LORazepam 0.5 mg oral tablet: Last Dose Taken:  , 1 tab(s) orally once a day, As Needed  · 	Amitiza 8 mcg oral capsule: Last Dose Taken:  , 1 cap(s) orally 2 times a day, As Needed  · 	omeprazole 40 mg oral delayed release capsule: Last Dose Taken:  , 1 cap(s) orally once a day, As Needed  · 	raNITIdine 150 mg oral tablet: Last Dose Taken:  , 1 tab(s) orally 2 times a day, As Needed  · 	MiraLax oral powder for reconstitution: Last Dose Taken:  , 34 gram(s) orally once a day, As Needed  · 	Proventil HFA 90 mcg/inh inhalation aerosol: Last Dose Taken:  , 2 puff(s) inhaled 4 times a day, As Needed  · 	mirtazapine 30 mg oral tablet: Last Dose Taken:  , 1 tab(s) orally once a day (at bedtime)    PMH/PSH/FH/SH:    Past Medical History:  Basal cell carcinoma  scalp  Constipation    Depression    GERD (gastroesophageal reflux disease)    Parkinson's disease    Seasonal allergies    Unilateral primary osteoarthritis, left hip.     Past Surgical History:  Abdominal hernia  with mesh, 2003  Basal cell carcinoma  removal, mid vertex scalp, 2002  H/O colonoscopy  , 2008 polypectomy, ,   H/O ovarian cystectomy  right,   History of   10/1982  S/P deep brain stimulator placement  2006, 2 brain pacemakers Model #30560, batteries replaced 2014.    Hospital Course:  69 y/o FM underwent left total hip arthroplasty (anterior approach) on 18 with Dr.J. Stokes.  Patient tolerated procedure well.  Patient was evaluated postoperatively by physical and occupational  therapists for weight bearing as tolerated ambulation and cleared patient for discharge home.  Patient advised to keep surgical incision/dressing clean and dry, and remove Aquacel dressing as directed.  Patient further advised to follow up with Dr. Stokes post operative day #14.

## 2018-06-22 NOTE — DISCHARGE NOTE ADULT - ADDITIONAL INSTRUCTIONS
Keep surgical incision/Aquacel dressing clean and dry, continue weight bearing as tolerated ambulation,  maintain anterior total hip precautions. Remove Aquacel dressing as directed. Follow up with Dr Stokes 2 weeks post op. Follow up with your primary care provider in 1-2 weeks.

## 2018-06-22 NOTE — OCCUPATIONAL THERAPY INITIAL EVALUATION ADULT - ANTICIPATED DISCHARGE DISPOSITION, OT EVAL
Home w/ home OT to assess balance and safety in home environment during the performance of functional activities and assistance from spouse for all functional activities as needed.

## 2018-06-22 NOTE — DISCHARGE NOTE ADULT - MEDICATION SUMMARY - MEDICATIONS TO TAKE
I will START or STAY ON the medications listed below when I get home from the hospital:    acetaminophen 325 mg oral tablet  -- 3 tab(s) by mouth every 8 hours  -- Indication: For Pain    oxyCODONE 5 mg oral tablet  -- 1-2 tab(s) by mouth every 4-6 hours, As needed, for moderate to severe pain. MDD:8  -- Indication: For Pain    traMADol 50 mg oral tablet  -- 1 tab(s) by mouth every 6 hours, As needed, Mild Pain (1 - 3) MDD:4  -- Indication: For Pain    celecoxib 200 mg oral capsule  -- 1 cap(s) by mouth once a day MDD:1  -- Indication: For Pain    aspirin 325 mg oral delayed release tablet  -- 1 tab(s) by mouth 2 times a day MDD:2  -- Indication: For Dvt prevention    LORazepam 0.5 mg oral tablet  -- 1 tab(s) by mouth once a day, As Needed  -- Indication: For Anxiety    Zoloft 100 mg oral tablet  -- 2 tab(s) by mouth once a day  -- Indication: For Depression    mirtazapine 30 mg oral tablet  -- 1 tab(s) by mouth once a day (at bedtime)  -- Indication: For Depression    Neupro 4 mg/24 hr transdermal film, extended release  -- 1 patch by transdermal patch once a day  -- Indication: For Parkinson's disease    Sinemet 25 mg-100 mg oral tablet  -- 1 tab(s) by mouth 3 times a day, As Needed  -- Indication: For Parkinson's disease    Parcopa 25 mg-100 mg oral tablet, disintegrating  -- 1 tab(s) by mouth 4 times a day, As Needed  -- Indication: For Parkinson's disease    Duopa 4.63 mg-20 mg/mL enteral suspension  -- 1 milliliter(s) enteral every hour via J-tube  -- Indication: For Parkinson's disease    Proventil HFA 90 mcg/inh inhalation aerosol  -- 2 puff(s) inhaled 4 times a day, As Needed  -- Indication: For Pulmonary med    Amitiza 8 mcg oral capsule  -- 1 cap(s) by mouth 2 times a day, As Needed  -- Indication: For GI med    raNITIdine 150 mg oral tablet  -- 1 tab(s) by mouth 2 times a day, As Needed  -- Indication: For GI prevention    MiraLax oral powder for reconstitution  -- 34 gram(s) by mouth once a day, As Needed  -- Indication: For Laxative    baclofen 10 mg oral tablet  -- 1 cap(s) by mouth , As Needed  -- Indication: For muscle relaxant    omeprazole 40 mg oral delayed release capsule  -- 1 cap(s) by mouth once a day, As Needed  -- Indication: For GI prevention

## 2018-06-22 NOTE — PHYSICAL THERAPY INITIAL EVALUATION ADULT - ADDITIONAL COMMENTS
Pt lives in a private ranch-style house with 3 steps to enter, 2 handrails but too wide apart. Pt states she was indep indoors using a U-step walker for mobility; walls in the house have rails

## 2018-06-22 NOTE — OCCUPATIONAL THERAPY INITIAL EVALUATION ADULT - LIVES WITH, PROFILE
spouse/Pt states lives in private home w/ spouse, 3 steps to enter in both front and back doors. Bedroom and bathroom are on first floor and entire house is handicap accessible. Per pt and pt spouse, pt previously independent for all self care tasks

## 2018-06-22 NOTE — DISCHARGE NOTE ADULT - PATIENT PORTAL LINK FT
You can access the [facility] Patient Portal, offered by [facility], by registering with the following website: [email or web address]

## 2018-06-22 NOTE — PROGRESS NOTE ADULT - SUBJECTIVE AND OBJECTIVE BOX
Systolic blood pressure in the 80s overnight. Given 2L boluses. Asymptomatic, no CP/SOB. Pain well controlled with pain medications.    Vital Signs Last 24 Hrs  T(C): 36.7 (22 Jun 2018 04:36), Max: 36.8 (21 Jun 2018 21:41)  T(F): 98 (22 Jun 2018 04:36), Max: 98.2 (21 Jun 2018 21:41)  HR: 96 (22 Jun 2018 04:36) (82 - 99)  BP: 100/61 (22 Jun 2018 04:36) (78/42 - 123/88)  BP(mean): 84 (21 Jun 2018 19:30) (62 - 84)  RR: 18 (22 Jun 2018 04:36) (14 - 18)  SpO2: 94% (22 Jun 2018 04:36) (90% - 99%)    Exam:  Gen: NAD  Motor: EHL/FHL/TA/Gastrocnemius intact  Sensory: SILT DP/SP/S/S/T nerve distributions  Dressing: Clean, Dry, Intact    A/P: 68 year old female s/p L SLIME  - Pain Control  - Regular Diet  - PT/OT, OOB  - Follow up AM labs  - Monitor BP

## 2018-06-22 NOTE — PHYSICAL THERAPY INITIAL EVALUATION ADULT - PLANNED THERAPY INTERVENTIONS, PT EVAL
bed mobility training/stairs: GOAL: Pt will negotiate 3 steps of stairs using appropriate assistive device and _ handrail via reciprocal/step-to technique indep in 2 weeks./transfer training/balance training/gait training

## 2018-06-22 NOTE — DISCHARGE NOTE ADULT - PLAN OF CARE
surgical intervention should result in improved function continue weight bearing as tolerated ambulation,  maintain anterior total hip precautions continue weight bearing as tolerated ambulation,  maintain anterior total hip precautions  Follow up with Dr. Stokes in 2 weeks.   Dressing: Keep clean and dry. Do not remove until the date specified on dressing.  Bathing: No direct water to dressing. No tub baths.  DVT prophylaxis: Take Aspirin 325mg Oral Twice Daily for 4 weeks.

## 2018-06-22 NOTE — DISCHARGE NOTE ADULT - NS AS ACTIVITY OBS
Walking-Indoors allowed/No Heavy lifting/straining/Do not drive or operate machinery/continue weight bearing as tolerated ambulation,  maintain anterior total hip precautions/Walking-Outdoors allowed/Stairs allowed/Do not make important decisions

## 2018-06-23 RX ORDER — SODIUM CHLORIDE 9 MG/ML
1000 INJECTION, SOLUTION INTRAVENOUS
Qty: 0 | Refills: 0 | Status: DISCONTINUED | OUTPATIENT
Start: 2018-06-23 | End: 2018-06-25

## 2018-06-23 RX ADMIN — CELECOXIB 200 MILLIGRAM(S): 200 CAPSULE ORAL at 21:47

## 2018-06-23 RX ADMIN — SERTRALINE 200 MILLIGRAM(S): 25 TABLET, FILM COATED ORAL at 11:18

## 2018-06-23 RX ADMIN — MIRTAZAPINE 30 MILLIGRAM(S): 45 TABLET, ORALLY DISINTEGRATING ORAL at 21:47

## 2018-06-23 RX ADMIN — TRAMADOL HYDROCHLORIDE 50 MILLIGRAM(S): 50 TABLET ORAL at 10:10

## 2018-06-23 RX ADMIN — TRAMADOL HYDROCHLORIDE 50 MILLIGRAM(S): 50 TABLET ORAL at 09:40

## 2018-06-23 RX ADMIN — Medication 100 MILLIGRAM(S): at 13:47

## 2018-06-23 RX ADMIN — Medication 100 MILLIGRAM(S): at 21:47

## 2018-06-23 RX ADMIN — Medication 100 MILLIGRAM(S): at 06:36

## 2018-06-23 RX ADMIN — Medication 30 MILLIGRAM(S): at 14:02

## 2018-06-23 RX ADMIN — Medication 325 MILLIGRAM(S): at 06:36

## 2018-06-23 RX ADMIN — Medication 325 MILLIGRAM(S): at 17:16

## 2018-06-23 RX ADMIN — SODIUM CHLORIDE 80 MILLILITER(S): 9 INJECTION, SOLUTION INTRAVENOUS at 11:20

## 2018-06-23 RX ADMIN — PANTOPRAZOLE SODIUM 40 MILLIGRAM(S): 20 TABLET, DELAYED RELEASE ORAL at 13:47

## 2018-06-23 RX ADMIN — OXYCODONE HYDROCHLORIDE 5 MILLIGRAM(S): 5 TABLET ORAL at 11:19

## 2018-06-23 RX ADMIN — Medication 650 MILLIGRAM(S): at 17:15

## 2018-06-23 RX ADMIN — OXYCODONE HYDROCHLORIDE 5 MILLIGRAM(S): 5 TABLET ORAL at 11:45

## 2018-06-23 RX ADMIN — Medication 30 MILLIGRAM(S): at 13:47

## 2018-06-23 NOTE — PROGRESS NOTE ADULT - PROBLEM SELECTOR PLAN 1
PT/OT-WBAT  IS  DVT PPx  Pain Control  Continue Current Tx.  Dispo planning    Srinivasa Carr PA-C  Team Pager: #[phone]

## 2018-06-23 NOTE — PROGRESS NOTE ADULT - SUBJECTIVE AND OBJECTIVE BOX
Post op Day [ 2]    Patient resting without complaints.  No chest pain, SOB, N/V.    T(C): 36.9 (06-23-18 @ 08:05), Max: 36.9 (06-22-18 @ 09:07)  HR: 95 (06-23-18 @ 08:05) (79 - 105)  BP: 111/70 (06-23-18 @ 08:05) (93/55 - 141/77)  RR: 18 (06-23-18 @ 08:05) (16 - 18)  SpO2: 96% (06-23-18 @ 08:05) (93% - 96%)  Wt(kg): --    Exam:  Alert and Oriented, No Acute Distress  L hip aquacel c/d/i with soft/compressible compartments  Calves Soft, Non-tender bilaterally  +PF/DF/EHL/FHL  SILT  +DP Pulse                        8.8    6.81  )-----------( 172      ( 22 Jun 2018 08:19 )             27.0    06-22    139  |  104  |  16  ----------------------------<  130<H>  3.7   |  24  |  0.49<L>    Ca    7.8<L>      22 Jun 2018 06:42

## 2018-06-24 LAB
ANION GAP SERPL CALC-SCNC: 12 MMOL/L — SIGNIFICANT CHANGE UP (ref 5–17)
BUN SERPL-MCNC: 16 MG/DL — SIGNIFICANT CHANGE UP (ref 7–23)
CALCIUM SERPL-MCNC: 8.4 MG/DL — SIGNIFICANT CHANGE UP (ref 8.4–10.5)
CHLORIDE SERPL-SCNC: 105 MMOL/L — SIGNIFICANT CHANGE UP (ref 96–108)
CO2 SERPL-SCNC: 25 MMOL/L — SIGNIFICANT CHANGE UP (ref 22–31)
CREAT SERPL-MCNC: 0.45 MG/DL — LOW (ref 0.5–1.3)
GLUCOSE SERPL-MCNC: 109 MG/DL — HIGH (ref 70–99)
HCT VFR BLD CALC: 29.6 % — LOW (ref 34.5–45)
HGB BLD-MCNC: 9.6 G/DL — LOW (ref 11.5–15.5)
MCHC RBC-ENTMCNC: 26.9 PG — LOW (ref 27–34)
MCHC RBC-ENTMCNC: 32.4 GM/DL — SIGNIFICANT CHANGE UP (ref 32–36)
MCV RBC AUTO: 82.9 FL — SIGNIFICANT CHANGE UP (ref 80–100)
PLATELET # BLD AUTO: 212 K/UL — SIGNIFICANT CHANGE UP (ref 150–400)
POTASSIUM SERPL-MCNC: 3.6 MMOL/L — SIGNIFICANT CHANGE UP (ref 3.5–5.3)
POTASSIUM SERPL-SCNC: 3.6 MMOL/L — SIGNIFICANT CHANGE UP (ref 3.5–5.3)
RBC # BLD: 3.57 M/UL — LOW (ref 3.8–5.2)
RBC # FLD: 15.7 % — HIGH (ref 10.3–14.5)
SODIUM SERPL-SCNC: 142 MMOL/L — SIGNIFICANT CHANGE UP (ref 135–145)
WBC # BLD: 6.96 K/UL — SIGNIFICANT CHANGE UP (ref 3.8–10.5)
WBC # FLD AUTO: 6.96 K/UL — SIGNIFICANT CHANGE UP (ref 3.8–10.5)

## 2018-06-24 RX ORDER — CELECOXIB 200 MG/1
1 CAPSULE ORAL
Qty: 0 | Refills: 0 | DISCHARGE
Start: 2018-06-24

## 2018-06-24 RX ORDER — ACETAMINOPHEN 500 MG
3 TABLET ORAL
Qty: 0 | Refills: 0 | DISCHARGE
Start: 2018-06-24

## 2018-06-24 RX ORDER — TRAMADOL HYDROCHLORIDE 50 MG/1
1 TABLET ORAL
Qty: 0 | Refills: 0 | DISCHARGE
Start: 2018-06-24

## 2018-06-24 RX ORDER — ACETAMINOPHEN 500 MG
2 TABLET ORAL
Qty: 0 | Refills: 0 | DISCHARGE
Start: 2018-06-24

## 2018-06-24 RX ORDER — ASPIRIN/CALCIUM CARB/MAGNESIUM 324 MG
1 TABLET ORAL
Qty: 0 | Refills: 0 | DISCHARGE
Start: 2018-06-24

## 2018-06-24 RX ORDER — OXYCODONE HYDROCHLORIDE 5 MG/1
1 TABLET ORAL
Qty: 0 | Refills: 0 | DISCHARGE
Start: 2018-06-24

## 2018-06-24 RX ADMIN — SERTRALINE 200 MILLIGRAM(S): 25 TABLET, FILM COATED ORAL at 11:46

## 2018-06-24 RX ADMIN — Medication 325 MILLIGRAM(S): at 17:06

## 2018-06-24 RX ADMIN — CELECOXIB 200 MILLIGRAM(S): 200 CAPSULE ORAL at 22:02

## 2018-06-24 RX ADMIN — PANTOPRAZOLE SODIUM 40 MILLIGRAM(S): 20 TABLET, DELAYED RELEASE ORAL at 11:47

## 2018-06-24 RX ADMIN — MIRTAZAPINE 30 MILLIGRAM(S): 45 TABLET, ORALLY DISINTEGRATING ORAL at 22:01

## 2018-06-24 RX ADMIN — CELECOXIB 200 MILLIGRAM(S): 200 CAPSULE ORAL at 22:01

## 2018-06-24 RX ADMIN — Medication 325 MILLIGRAM(S): at 05:10

## 2018-06-24 RX ADMIN — Medication 100 MILLIGRAM(S): at 05:10

## 2018-06-24 NOTE — PROGRESS NOTE ADULT - ASSESSMENT
Impression: Stable       Plan:   Continue present treatment                 Out of bed, ambulate, weight bearing as tolerated                  Physical therapy follow up                  Continue to monitor    Kranthi Graham PA-C  Orthopaedic Surgery  Team pager [phone]  [phone]

## 2018-06-24 NOTE — PROGRESS NOTE ADULT - SUBJECTIVE AND OBJECTIVE BOX
ORTHO  Patient is a 68y old  Female who presents with a chief complaint of "left hip replacement" (22 Jun 2018 13:17)    Pt. resting without complaint    VS-  T(C): 36.7 (06-24-18 @ 05:25), Max: 36.9 (06-23-18 @ 08:05)  HR: 95 (06-24-18 @ 05:25) (79 - 98)  BP: 158/87 (06-24-18 @ 05:25) (97/57 - 158/87)  RR: 18 (06-24-18 @ 05:25) (16 - 18)  SpO2: 95% (06-24-18 @ 05:25) (93% - 96%)  Wt(kg): --    M.S. A&O  Extremity- Left hip aqua cell dressing C/D/I  Neuro-              Motor- (+) Ankle DF/PF              Sensation- grossly intact to light touch              Calves- soft, nontender                             8.8    6.81  )-----------( 172      ( 22 Jun 2018 08:19 )             27.0

## 2018-06-25 VITALS
RESPIRATION RATE: 18 BRPM | DIASTOLIC BLOOD PRESSURE: 70 MMHG | OXYGEN SATURATION: 95 % | TEMPERATURE: 98 F | SYSTOLIC BLOOD PRESSURE: 110 MMHG | HEART RATE: 95 BPM

## 2018-06-25 PROCEDURE — 97110 THERAPEUTIC EXERCISES: CPT

## 2018-06-25 PROCEDURE — 97535 SELF CARE MNGMENT TRAINING: CPT

## 2018-06-25 PROCEDURE — 80048 BASIC METABOLIC PNL TOTAL CA: CPT

## 2018-06-25 PROCEDURE — 97165 OT EVAL LOW COMPLEX 30 MIN: CPT

## 2018-06-25 PROCEDURE — 72170 X-RAY EXAM OF PELVIS: CPT

## 2018-06-25 PROCEDURE — C1713: CPT

## 2018-06-25 PROCEDURE — 82962 GLUCOSE BLOOD TEST: CPT

## 2018-06-25 PROCEDURE — 85027 COMPLETE CBC AUTOMATED: CPT

## 2018-06-25 PROCEDURE — 97162 PT EVAL MOD COMPLEX 30 MIN: CPT

## 2018-06-25 PROCEDURE — 97530 THERAPEUTIC ACTIVITIES: CPT

## 2018-06-25 PROCEDURE — C1776: CPT

## 2018-06-25 PROCEDURE — 97116 GAIT TRAINING THERAPY: CPT

## 2018-06-25 PROCEDURE — 86900 BLOOD TYPING SEROLOGIC ABO: CPT

## 2018-06-25 PROCEDURE — C1889: CPT

## 2018-06-25 PROCEDURE — 86901 BLOOD TYPING SEROLOGIC RH(D): CPT

## 2018-06-25 RX ORDER — ASPIRIN/CALCIUM CARB/MAGNESIUM 324 MG
1 TABLET ORAL
Qty: 60 | Refills: 0
Start: 2018-06-25 | End: 2018-07-24

## 2018-06-25 RX ORDER — CELECOXIB 200 MG/1
1 CAPSULE ORAL
Qty: 14 | Refills: 0
Start: 2018-06-25 | End: 2018-07-08

## 2018-06-25 RX ORDER — ASPIRIN/CALCIUM CARB/MAGNESIUM 324 MG
1 TABLET ORAL
Qty: 84 | Refills: 0
Start: 2018-06-25 | End: 2018-08-05

## 2018-06-25 RX ORDER — TRAMADOL HYDROCHLORIDE 50 MG/1
1 TABLET ORAL
Qty: 24 | Refills: 0
Start: 2018-06-25

## 2018-06-25 RX ORDER — OXYCODONE HYDROCHLORIDE 5 MG/1
1 TABLET ORAL
Qty: 70 | Refills: 0
Start: 2018-06-25

## 2018-06-25 RX ADMIN — Medication 325 MILLIGRAM(S): at 05:34

## 2018-06-25 RX ADMIN — TRAMADOL HYDROCHLORIDE 50 MILLIGRAM(S): 50 TABLET ORAL at 05:37

## 2018-06-25 RX ADMIN — PANTOPRAZOLE SODIUM 40 MILLIGRAM(S): 20 TABLET, DELAYED RELEASE ORAL at 11:27

## 2018-06-25 RX ADMIN — TRAMADOL HYDROCHLORIDE 50 MILLIGRAM(S): 50 TABLET ORAL at 06:07

## 2018-06-25 RX ADMIN — POLYETHYLENE GLYCOL 3350 17 GRAM(S): 17 POWDER, FOR SOLUTION ORAL at 11:28

## 2018-06-25 NOTE — PROGRESS NOTE ADULT - SUBJECTIVE AND OBJECTIVE BOX
Post op Day [4 ]    Patient resting, subjectively feels "off with balance because of her Parkinson's today."  Patient is on continuous therapy via infusion pack, and also has oral PRN meds, she bolus'd herself at bedside with meds this AM after our discussion.  No chest pain, SOB, N/V.    T(C): 36.6 (06-25-18 @ 04:17), Max: 37.1 (06-24-18 @ 08:10)  HR: 83 (06-25-18 @ 04:17) (79 - 97)  BP: 119/73 (06-25-18 @ 04:17) (96/59 - 122/74)  RR: 18 (06-25-18 @ 04:17) (16 - 18)  SpO2: 95% (06-25-18 @ 04:17) (92% - 97%)  Wt(kg): --    Exam:  Alert and Oriented, No Acute Distress, no focal deficits  L hip aquacel c/d/i with soft/compressible compartments  Calves Soft, Non-tender bilaterally  +PF/DF/EHL/FHL  SILT  +DP Pulse                        9.6    6.96  )-----------( 212      ( 24 Jun 2018 08:18 )             29.6    06-24    142  |  105  |  16  ----------------------------<  109<H>  3.6   |  25  |  0.45<L>    Ca    8.4      24 Jun 2018 07:08

## 2018-06-25 NOTE — PROGRESS NOTE ADULT - ATTENDING COMMENTS
I agree with the above note and have personally seen and examined this patient. All pertinent films have been reviewed. Please refer to clinical documentation of the history, physical examinations, data summary, and both assessment and plan as documented above and with which I agree.    doing well, pain controlled but pain with standing, we had minimized narcs 2/2 hypotension  hypotensive overnight which I personally evaluated approximately 1am and patient was mentating well, not tachy, not pale appearing, otherwise felt fine and CBC performed which was relatively stable with minor dec in h/h 2/2 multiple fluid bolus.  this morning feels fine, bp more stable.     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
I agree with the above note and have personally seen and examined this patient. All pertinent films have been reviewed. Please refer to clinical documentation of the history, physical examinations, data summary, and both assessment and plan as documented above and with which I agree.    doing well, pain controlled  anxious  making slow progress with PT, discussed with patient possibility of going to snf instead of home but she insists on going home with home pt, has excellent home support from  but not walkign enough with PT to safely discharge, she and her  told me that last night she walked a lot with her  on their own and she will do better today she states    afvss  nad  lle  dressing cdi, thigh soft  5/5 q/h/ta/ehl/gcs  silt l4-s1  2+ dp    doing well s/p L SLIME, slow progress  anticipate dc home monday pending further pt progress, will need home pt but if no further progress with pt then will need to start discussion of short term snf stay  wbat  asa 325mg po bid x 30 days, venodynes  follow up office in 2 weeks    Rob Stokes MD  Attending Orthopedic Surgeon
I agree with the above note and have personally seen and examined this patient. All pertinent films have been reviewed. Please refer to clinical documentation of the history, physical examinations, data summary, and both assessment and plan as documented above and with which I agree.    doing well, pain controlled  not feeling "off balance" anymore after self-admin parkinson meds  less anxious  significant improvement with PT yesterday, able to ambulate stairs as well, cleared for dc home with home pt    afvss  nad  lle  dressing cdi, thigh soft  5/5 q/h/ta/ehl/gcs  silt l4-s1  2+ dp    doing well s/p L SLIME  discharge home today, home pt  wbat  asa 325mg po bid x 30 days, venodynes  follow up office in 2 weeks     Rob Stokes MD  Attending Orthopedic Surgeon
I agree with the above note and have personally seen and examined this patient. All pertinent films have been reviewed. Please refer to clinical documentation of the history, physical examinations, data summary, and both assessment and plan as documented above and with which I agree.    doing well, pain controlled  progressing with PT  bp stable during day but again becomes slightly hypotensive overnight, likely her baseline, always mentating well, not tachy, asymptomatic    afvss  nad  lle  dressing cdi, thigh soft  5/5 q/h/ta/ehl/gcs  silt l4-s1  2+ dp    doing well s/p L SLIME  anticipate dc home today vs tomorrow pending further pt progress, will need home pt  wbat  asa 325mg po bid x 30 days, venodynes  follow up office in 2 weeks    Rob Stokes MD  Attending Orthopedic Surgeon

## 2018-06-25 NOTE — PROGRESS NOTE ADULT - PROBLEM SELECTOR PLAN 1
PT/OT-WBAT  Continue Parkinson's tx  IS  DVT PPx  Pain Control  Continue Current Tx.  Discharge home today p PT    Srinivasa Carr PA-C  Team Pager: #[phone]

## 2018-06-26 ENCOUNTER — CHART COPY (OUTPATIENT)
Age: 68
End: 2018-06-26

## 2018-06-29 ENCOUNTER — RX RENEWAL (OUTPATIENT)
Age: 68
End: 2018-06-29

## 2018-07-09 PROBLEM — M16.12 PRIMARY OSTEOARTHRITIS OF LEFT HIP: Status: RESOLVED | Noted: 2018-01-02 | Resolved: 2018-07-09

## 2018-07-09 PROBLEM — M16.12 OSTEOARTHRITIS OF LEFT HIP, UNSPECIFIED OSTEOARTHRITIS TYPE: Status: RESOLVED | Noted: 2017-10-23 | Resolved: 2018-07-09

## 2018-07-10 ENCOUNTER — APPOINTMENT (OUTPATIENT)
Dept: ORTHOPEDIC SURGERY | Facility: CLINIC | Age: 68
End: 2018-07-10
Payer: MEDICARE

## 2018-07-10 VITALS — HEIGHT: 65 IN | BODY MASS INDEX: 24.99 KG/M2 | WEIGHT: 150 LBS

## 2018-07-10 DIAGNOSIS — M16.12 UNILATERAL PRIMARY OSTEOARTHRITIS, LEFT HIP: ICD-10-CM

## 2018-07-10 PROCEDURE — 99024 POSTOP FOLLOW-UP VISIT: CPT

## 2018-07-10 PROCEDURE — 73502 X-RAY EXAM HIP UNI 2-3 VIEWS: CPT | Mod: LT

## 2018-07-10 RX ORDER — KETOCONAZOLE 20 MG/G
2 CREAM TOPICAL
Qty: 60 | Refills: 0 | Status: ACTIVE | COMMUNITY
Start: 2018-03-12

## 2018-07-10 RX ORDER — MIRTAZAPINE 30 MG/1
30 TABLET, FILM COATED ORAL
Qty: 90 | Refills: 0 | Status: ACTIVE | COMMUNITY
Start: 2018-02-10

## 2018-07-10 RX ORDER — SERTRALINE HYDROCHLORIDE 100 MG/1
100 TABLET, FILM COATED ORAL
Qty: 180 | Refills: 0 | Status: ACTIVE | COMMUNITY
Start: 2018-01-11

## 2018-07-10 RX ORDER — SULFAMETHOXAZOLE AND TRIMETHOPRIM 800; 160 MG/1; MG/1
800-160 TABLET ORAL
Qty: 14 | Refills: 0 | Status: ACTIVE | COMMUNITY
Start: 2018-03-06

## 2018-07-10 RX ORDER — OXYCODONE AND ACETAMINOPHEN 5; 325 MG/1; MG/1
5-325 TABLET ORAL
Qty: 21 | Refills: 0 | Status: ACTIVE | COMMUNITY
Start: 2018-03-06

## 2018-07-10 RX ORDER — OXYCODONE 5 MG/1
5 TABLET ORAL
Qty: 70 | Refills: 0 | Status: ACTIVE | COMMUNITY
Start: 2018-06-25

## 2018-07-10 RX ORDER — CARBIDOPA AND LEVODOPA 25; 100 MG/1; MG/1
25-100 TABLET, EXTENDED RELEASE ORAL
Qty: 90 | Refills: 0 | Status: ACTIVE | COMMUNITY
Start: 2018-02-13

## 2018-07-10 RX ORDER — ROTIGOTINE 4 MG/24H
4 PATCH, EXTENDED RELEASE TRANSDERMAL
Qty: 90 | Refills: 0 | Status: ACTIVE | COMMUNITY
Start: 2018-03-02

## 2018-07-10 RX ORDER — MOMETASONE FUROATE 1 MG/G
0.1 CREAM TOPICAL
Qty: 45 | Refills: 0 | Status: ACTIVE | COMMUNITY
Start: 2018-03-12

## 2018-07-10 RX ORDER — POLYETHYLENE GLYCOL 3350 17 G/17G
17 POWDER, FOR SOLUTION ORAL
Qty: 2635 | Refills: 0 | Status: ACTIVE | COMMUNITY
Start: 2018-03-15

## 2018-07-10 RX ORDER — KETOCONAZOLE 20.5 MG/ML
2 SHAMPOO, SUSPENSION TOPICAL
Qty: 120 | Refills: 0 | Status: ACTIVE | COMMUNITY
Start: 2018-03-26

## 2018-07-10 RX ORDER — MIRTAZAPINE 15 MG/1
15 TABLET, FILM COATED ORAL
Qty: 90 | Refills: 0 | Status: ACTIVE | COMMUNITY
Start: 2018-01-11

## 2018-07-10 RX ORDER — FLUOCINONIDE 0.5 MG/ML
0.05 SOLUTION TOPICAL
Qty: 60 | Refills: 0 | Status: ACTIVE | COMMUNITY
Start: 2018-03-26

## 2018-07-10 RX ORDER — BACLOFEN 10 MG/1
10 TABLET ORAL
Qty: 180 | Refills: 0 | Status: ACTIVE | COMMUNITY
Start: 2018-02-10

## 2018-07-10 RX ORDER — TRAMADOL HYDROCHLORIDE 50 MG/1
50 TABLET, COATED ORAL
Qty: 24 | Refills: 0 | Status: ACTIVE | COMMUNITY
Start: 2018-06-25

## 2018-07-10 RX ORDER — MUPIROCIN 20 MG/G
2 OINTMENT TOPICAL
Qty: 22 | Refills: 0 | Status: ACTIVE | COMMUNITY
Start: 2018-02-27

## 2018-08-21 ENCOUNTER — APPOINTMENT (OUTPATIENT)
Dept: ORTHOPEDIC SURGERY | Facility: CLINIC | Age: 68
End: 2018-08-21
Payer: MEDICARE

## 2018-08-21 VITALS — WEIGHT: 150 LBS | BODY MASS INDEX: 24.99 KG/M2 | HEIGHT: 65 IN

## 2018-08-21 PROBLEM — C44.91 BASAL CELL CARCINOMA OF SKIN, UNSPECIFIED: Chronic | Status: ACTIVE | Noted: 2018-06-04

## 2018-08-21 PROBLEM — G20 PARKINSON'S DISEASE: Chronic | Status: ACTIVE | Noted: 2018-06-04

## 2018-08-21 PROBLEM — F32.9 MAJOR DEPRESSIVE DISORDER, SINGLE EPISODE, UNSPECIFIED: Chronic | Status: ACTIVE | Noted: 2018-06-04

## 2018-08-21 PROBLEM — K59.00 CONSTIPATION, UNSPECIFIED: Chronic | Status: ACTIVE | Noted: 2018-06-04

## 2018-08-21 PROBLEM — K21.9 GASTRO-ESOPHAGEAL REFLUX DISEASE WITHOUT ESOPHAGITIS: Chronic | Status: ACTIVE | Noted: 2018-06-04

## 2018-08-21 PROBLEM — M16.12 UNILATERAL PRIMARY OSTEOARTHRITIS, LEFT HIP: Chronic | Status: ACTIVE | Noted: 2018-06-04

## 2018-08-21 PROBLEM — J30.2 OTHER SEASONAL ALLERGIC RHINITIS: Chronic | Status: ACTIVE | Noted: 2018-06-04

## 2018-08-21 PROCEDURE — 99024 POSTOP FOLLOW-UP VISIT: CPT

## 2018-09-19 ENCOUNTER — FORM ENCOUNTER (OUTPATIENT)
Age: 68
End: 2018-09-19

## 2019-02-04 NOTE — PHYSICAL THERAPY INITIAL EVALUATION ADULT - ADL SKILLS, REHAB EVAL
etonogestrel-ethinyl estradiol (NUVARING) 0.12-0.015 MG/24HR vaginal ring    3 ordered  Edit     Summary: Place 1 ring every 21 days then remove for 1 week., Disp-3 each, R-3, E-Prescribe   Start: 5/2/2016  Ord/Sold: 5/2/2016 (O)  Report  Taking:   Long-term:   Pharmacy: [facility]/pharmacy #[id number] - [place], [place] - [address]  Med Dose History       Patient Sig: Place 1 ring every 21 days then remove for 1 week.       Ordered on: 5/2/2016       Authorized by: LEELA HARTLEY       Dispense: 3 each       Admin Instructions: Place 1 ring every 21 days then remove for 1 week.       Prior Authorization: Closed        Last Office Visit with [facility], [facility]AR[facility]AP or [facility] prescribing provider: 5-                                                independent Add Progress Note...

## 2019-02-05 ENCOUNTER — APPOINTMENT (OUTPATIENT)
Dept: ORTHOPEDIC SURGERY | Facility: CLINIC | Age: 69
End: 2019-02-05
Payer: MEDICARE

## 2019-02-05 VITALS
HEIGHT: 65 IN | DIASTOLIC BLOOD PRESSURE: 64 MMHG | WEIGHT: 161 LBS | BODY MASS INDEX: 26.82 KG/M2 | HEART RATE: 87 BPM | SYSTOLIC BLOOD PRESSURE: 111 MMHG

## 2019-02-05 DIAGNOSIS — M70.61 TROCHANTERIC BURSITIS, RIGHT HIP: ICD-10-CM

## 2019-02-05 PROCEDURE — 73522 X-RAY EXAM HIPS BI 3-4 VIEWS: CPT

## 2019-02-05 PROCEDURE — 99213 OFFICE O/P EST LOW 20 MIN: CPT

## 2019-02-05 NOTE — REASON FOR VISIT
[Follow-Up Visit] : a follow-up visit for [Artificial Hip Joint] : artificial hip joint [Hip Pain] : hip pain [Spouse] : spouse

## 2019-02-05 NOTE — REVIEW OF SYSTEMS
[Arthralgia] : no arthralgia [Joint Pain] : joint pain [Joint Stiffness] : no joint stiffness [Joint Swelling] : no joint swelling [Negative] : Heme/Lymph

## 2019-02-05 NOTE — HISTORY OF PRESENT ILLNESS
[de-identified] : This is a very pleasant 69-year-old female who is now 6 months status post left total hip arthroplasty doing extremely well.  She feels like she has her life back and her left hip is no longer limiting her.  She ambulates with a walker.  She comes in with a new complaint of right lateral hip based hip pain that is nonradiating.  No groin pain.  No thigh pain.  The pain does not radiate down her legs and it is not associated with numbness or tingling or weakness.  She does not take any medications for this pain.  Her activities of daily living are not limited because of her hips anymore and is only limited because of her Parkinson's disease.  She is thrilled with her outcome from her left hip.

## 2019-02-05 NOTE — PHYSICAL EXAM
[de-identified] : Patient is well nourished, well-developed, in no acute distress, with appropriate mood and affect. The patient is oriented to time, place, and person. Respirations are even and unlabored. Gait evaluation does reveal a limp with a shuffling gait secondary to Parkinson's. There is no inguinal adenopathy. \par The right limb is well-perfused and showed 2+ dp/pt pulses, without skin lesions, shows a grossly normal motor and sensory examination. Examination of the hip shows no skin lesions. Hip motion is full and painless from 0-90 degrees extension to flexion, 20 degrees adduction and 20 degrees abduction, and 15 degrees internal and 30 degrees external rotation. Stinchfield test is negative. FADIR test is negative. KIRAN test is negative.  Tender to palpation over the trochanteric bursa and pain is reproduced with cross body adduction of the hip.  The left limb is well-perfused and showed 2+ dp/pt pulses, without skin lesions, shows a grossly normal motor and sensory examination. Examination of the hip shows a well-healed anteriorly based surgical scar. Hip motion is full and painless from 0-90 degrees extension to flexion, 20 degrees adduction and 20 degrees abduction, and 15 degrees internal and 30 degrees external rotation. Leg lengths are approximately equal. Both hips are stable and muscle strength is normal with good strength with resisted abduction and adduction. Pedal pulses are palpable. [de-identified] : AP pelvis, AP hip, and lateral x-rays of the left hip were reviewed. Satisfactory position and alignment of the components are present. No signs of loosening are seen.\par AP and lateral x-rays of the right hip, pelvis, and femur were ordered and taken in the office and demonstrate no evidence of degenerative joint disease of the hip with maintained joint space and no evidence of fractures or other intraarticular pathology.

## 2019-02-05 NOTE — DISCUSSION/SUMMARY
[de-identified] : This patient has right hip trochanteric bursitis.  She is now 6-month status post left total hip arthroplasty doing extremely well.  She will continue to be weightbearing as tolerated without restrictions.  We discussed the possibility of performing a cortisone injection for the right hip today but she would like to defer this at this time.  I did give her a home exercise program and prescribed a course of physical therapy for strengthening and gait training.  Over-the-counter NSAIDs as tolerated are allowed.  Follow-up at the one-year anniversary from her left total hip arthroplasty.

## 2019-02-21 NOTE — H&P PST ADULT - PT NEEDS ASSIST
Stress echo without ischemia.   Ok to proceed with breast surgery with intermediate cardiac risk.   Continue alondra-operative beta blocker and statin.    yes

## 2019-03-01 NOTE — PATIENT PROFILE ADULT. - PRO PAIN EXPRESSION
I have personally evaluated and examined the patient. The Attending was available to me as a supervising provider if needed. verbalization

## 2019-06-19 ENCOUNTER — APPOINTMENT (OUTPATIENT)
Dept: ORTHOPEDIC SURGERY | Facility: CLINIC | Age: 69
End: 2019-06-19
Payer: MEDICARE

## 2019-06-19 VITALS — SYSTOLIC BLOOD PRESSURE: 118 MMHG | HEART RATE: 85 BPM | DIASTOLIC BLOOD PRESSURE: 77 MMHG

## 2019-06-19 VITALS — WEIGHT: 165 LBS | HEIGHT: 65 IN | BODY MASS INDEX: 27.49 KG/M2

## 2019-06-19 DIAGNOSIS — Z96.642 PRESENCE OF LEFT ARTIFICIAL HIP JOINT: ICD-10-CM

## 2019-06-19 PROCEDURE — 99212 OFFICE O/P EST SF 10 MIN: CPT

## 2019-06-19 PROCEDURE — 73502 X-RAY EXAM HIP UNI 2-3 VIEWS: CPT

## 2019-06-19 NOTE — PHYSICAL EXAM
[de-identified] : AP pelvis, AP hip, and lateral x-rays of the left hip were reviewed. Satisfactory position and alignment of the components are present. No signs of loosening are seen. [de-identified] : Patient is well nourished, well-developed, in no acute distress, with appropriate mood and affect. The patient is oriented to time, place, and person. Respirations are even and unlabored. Gait evaluation does not reveal a limp. There is no inguinal adenopathy. Examination of the contralateral hip shows normal range of motion, strength, no tenderness, and intact skin. The affected limb is well-perfused and showed 2+ dp/pt pulses, without skin lesions, shows a grossly normal motor and sensory examination. Examination of the hip shows a well-healed anteriorly based surgical scar. Hip motion is full and painless from 0-90 degrees extension to flexion, 20 degrees adduction and 20 degrees abduction, and 15 degrees internal and 30 degrees external rotation. Leg lengths are approximately equal. FADIR is negative and KIRAN is negative. Stinchfield test is negative. Both hips are stable and muscle strength is normal with good strength with resisted abduction and adduction. Pedal pulses are palpable.

## 2019-06-19 NOTE — REVIEW OF SYSTEMS
[Joint Pain] : joint pain [Joint Stiffness] : no joint stiffness [Arthralgia] : no arthralgia [Joint Swelling] : no joint swelling [Negative] : Endocrine

## 2019-06-19 NOTE — DISCUSSION/SUMMARY
[de-identified] : 1 year status post left total hip arthroplasty doing well.  She may continue to be weightbearing as tolerated without restriction.  I will prescribe her a course of physical therapy to help her with gait training because she is now limited only by her Parkinson's disease.  Follow-up is recommended for the left hip in 3 years.

## 2019-06-19 NOTE — HISTORY OF PRESENT ILLNESS
[de-identified] : This is a very pleasant 69-year-old female who is now 12 months status post left total hip arthroplasty doing extremely well.  She feels like she has her life back and her left hip is no longer limiting her.  She ambulates with a walker.  She is frustrated with her Parkinson's disease that it is only limiting her but the leg is no longer limiting her.  Neuro she is not taking any pain medication.   Her activities of daily living are not limited because of her hips anymore and is only limited because of her Parkinson's disease.  She is thrilled with her outcome from her left hip.

## 2020-11-27 NOTE — H&P PST ADULT - NSCAFFEAMTFREQ_GEN_ALL_CORE_SD
Pt need urgent EGD in next 2-3 weeks.     Had EGD 11/22 at an outside facility for UGI bleed requiring two units of blood. Reports as below.     Egd:  1. Large, grade II esophageal varices with red nadia sign and a moderate amount of old blood in the fundus. 6 bands successfully placed resulting in complete eradication. This is the suspected cause of her hematochezia and hematemesis.  2. Moderate portal hypertensive gastropathy with coffee ground material in the stomach. This is also contributing to her coffee ground emesis.  3. Clean based duodenal bulb ulcer without bleeding, likely related to NSAID use.       Schedule Procedure:     Please Schedule 2-3 weeks  Procedure: EGD ([id number])  Diagnosis: Cirrhosis K74.60  Is patient:    Diabetic? No     ANTIPLATELET / ANTICOAGULATION:  MEDICATION:  None      Prophylactic Antibiotics? No  Location: [facility]  Special Instructions:   MAC Anesthesia              1-2 cups/cans per day

## 2021-03-22 NOTE — PATIENT PROFILE ADULT. - SPIRITUAL CULTURAL, RELIGIOUS PRACTICES/VALUES, PROFILE
----- Message from ANITHA Quiñones sent at 3/22/2021  1:57 PM CDT -----  I do not see a nephrologist in her care team.  She should be seeing 1 and these results should be going to them.  
Patient has appt set with nephrology tomorrow   
denies

## 2022-09-03 ENCOUNTER — INPATIENT (INPATIENT)
Facility: HOSPITAL | Age: 72
LOS: 12 days | Discharge: HOME | DRG: 871 | End: 2022-09-16
Attending: INTERNAL MEDICINE | Admitting: INTERNAL MEDICINE
Payer: MEDICARE

## 2022-09-03 VITALS
RESPIRATION RATE: 28 BRPM | SYSTOLIC BLOOD PRESSURE: 104 MMHG | HEIGHT: 65 IN | TEMPERATURE: 99 F | WEIGHT: 149.91 LBS | OXYGEN SATURATION: 72 % | HEART RATE: 77 BPM | DIASTOLIC BLOOD PRESSURE: 57 MMHG

## 2022-09-03 DIAGNOSIS — Z96.89 PRESENCE OF OTHER SPECIFIED FUNCTIONAL IMPLANTS: Chronic | ICD-10-CM

## 2022-09-03 DIAGNOSIS — K46.9 UNSPECIFIED ABDOMINAL HERNIA WITHOUT OBSTRUCTION OR GANGRENE: Chronic | ICD-10-CM

## 2022-09-03 DIAGNOSIS — Z98.890 OTHER SPECIFIED POSTPROCEDURAL STATES: Chronic | ICD-10-CM

## 2022-09-03 DIAGNOSIS — Z98.891 HISTORY OF UTERINE SCAR FROM PREVIOUS SURGERY: Chronic | ICD-10-CM

## 2022-09-03 DIAGNOSIS — C44.91 BASAL CELL CARCINOMA OF SKIN, UNSPECIFIED: Chronic | ICD-10-CM

## 2022-09-03 PROCEDURE — 99285 EMERGENCY DEPT VISIT HI MDM: CPT | Mod: CS

## 2022-09-04 ENCOUNTER — TRANSCRIPTION ENCOUNTER (OUTPATIENT)
Age: 72
End: 2022-09-04

## 2022-09-04 DIAGNOSIS — K59.00 CONSTIPATION, UNSPECIFIED: ICD-10-CM

## 2022-09-04 DIAGNOSIS — J96.01 ACUTE RESPIRATORY FAILURE WITH HYPOXIA: ICD-10-CM

## 2022-09-04 DIAGNOSIS — R50.9 FEVER, UNSPECIFIED: ICD-10-CM

## 2022-09-04 DIAGNOSIS — G20 PARKINSON'S DISEASE: ICD-10-CM

## 2022-09-04 LAB
-  CTX-M RESISTANCE MARKER: SIGNIFICANT CHANGE UP
-  ESBL: SIGNIFICANT CHANGE UP
ALBUMIN SERPL ELPH-MCNC: 2.1 G/DL — LOW (ref 3.3–5)
ALBUMIN SERPL ELPH-MCNC: 2.3 G/DL — LOW (ref 3.3–5)
ALP SERPL-CCNC: 178 U/L — HIGH (ref 40–120)
ALP SERPL-CCNC: 202 U/L — HIGH (ref 40–120)
ALT FLD-CCNC: 20 U/L — SIGNIFICANT CHANGE UP (ref 12–78)
ALT FLD-CCNC: 26 U/L — SIGNIFICANT CHANGE UP (ref 12–78)
ANION GAP SERPL CALC-SCNC: 14 MMOL/L — SIGNIFICANT CHANGE UP (ref 5–17)
ANION GAP SERPL CALC-SCNC: 9 MMOL/L — SIGNIFICANT CHANGE UP (ref 5–17)
APPEARANCE UR: ABNORMAL
APTT BLD: 26.9 SEC — LOW (ref 27.5–35.5)
AST SERPL-CCNC: 45 U/L — HIGH (ref 15–37)
AST SERPL-CCNC: 57 U/L — HIGH (ref 15–37)
BASE EXCESS BLDA CALC-SCNC: 6.5 MMOL/L — HIGH (ref -2–3)
BASOPHILS # BLD AUTO: 0 K/UL — SIGNIFICANT CHANGE UP (ref 0–0.2)
BASOPHILS # BLD AUTO: 0.05 K/UL — SIGNIFICANT CHANGE UP (ref 0–0.2)
BASOPHILS NFR BLD AUTO: 0 % — SIGNIFICANT CHANGE UP (ref 0–2)
BASOPHILS NFR BLD AUTO: 0.5 % — SIGNIFICANT CHANGE UP (ref 0–2)
BILIRUB SERPL-MCNC: 0.4 MG/DL — SIGNIFICANT CHANGE UP (ref 0.2–1.2)
BILIRUB SERPL-MCNC: 0.6 MG/DL — SIGNIFICANT CHANGE UP (ref 0.2–1.2)
BILIRUB UR-MCNC: NEGATIVE — SIGNIFICANT CHANGE UP
BLOOD GAS COMMENTS ARTERIAL: SIGNIFICANT CHANGE UP
BUN SERPL-MCNC: 75 MG/DL — HIGH (ref 7–23)
BUN SERPL-MCNC: 95 MG/DL — HIGH (ref 7–23)
CALCIUM SERPL-MCNC: 8.4 MG/DL — LOW (ref 8.5–10.1)
CALCIUM SERPL-MCNC: 9 MG/DL — SIGNIFICANT CHANGE UP (ref 8.5–10.1)
CHLORIDE SERPL-SCNC: 100 MMOL/L — SIGNIFICANT CHANGE UP (ref 96–108)
CHLORIDE SERPL-SCNC: 93 MMOL/L — LOW (ref 96–108)
CHOLEST SERPL-MCNC: 124 MG/DL — SIGNIFICANT CHANGE UP
CO2 SERPL-SCNC: 27 MMOL/L — SIGNIFICANT CHANGE UP (ref 22–31)
CO2 SERPL-SCNC: 28 MMOL/L — SIGNIFICANT CHANGE UP (ref 22–31)
COLOR SPEC: YELLOW — SIGNIFICANT CHANGE UP
CREAT SERPL-MCNC: 1.1 MG/DL — SIGNIFICANT CHANGE UP (ref 0.5–1.3)
CREAT SERPL-MCNC: 1.5 MG/DL — HIGH (ref 0.5–1.3)
D DIMER BLD IA.RAPID-MCNC: 1731 NG/ML DDU — HIGH
DIFF PNL FLD: ABNORMAL
EGFR: 36 ML/MIN/1.73M2 — LOW
EGFR: 53 ML/MIN/1.73M2 — LOW
EOSINOPHIL # BLD AUTO: 0 K/UL — SIGNIFICANT CHANGE UP (ref 0–0.5)
EOSINOPHIL # BLD AUTO: 0.04 K/UL — SIGNIFICANT CHANGE UP (ref 0–0.5)
EOSINOPHIL NFR BLD AUTO: 0 % — SIGNIFICANT CHANGE UP (ref 0–6)
EOSINOPHIL NFR BLD AUTO: 0.4 % — SIGNIFICANT CHANGE UP (ref 0–6)
FERRITIN SERPL-MCNC: 419 NG/ML — HIGH (ref 15–150)
FLUAV AG NPH QL: SIGNIFICANT CHANGE UP
FLUBV AG NPH QL: SIGNIFICANT CHANGE UP
GLUCOSE SERPL-MCNC: 108 MG/DL — HIGH (ref 70–99)
GLUCOSE SERPL-MCNC: 110 MG/DL — HIGH (ref 70–99)
GLUCOSE UR QL: NEGATIVE — SIGNIFICANT CHANGE UP
GRAM STN FLD: SIGNIFICANT CHANGE UP
HCO3 BLDA-SCNC: 29 MMOL/L — HIGH (ref 21–28)
HCT VFR BLD CALC: 27.7 % — LOW (ref 34.5–45)
HCT VFR BLD CALC: 30.5 % — LOW (ref 34.5–45)
HDLC SERPL-MCNC: 11 MG/DL — LOW
HGB BLD-MCNC: 9 G/DL — LOW (ref 11.5–15.5)
HGB BLD-MCNC: 9.9 G/DL — LOW (ref 11.5–15.5)
IMM GRANULOCYTES NFR BLD AUTO: 0.7 % — SIGNIFICANT CHANGE UP (ref 0–1.5)
INR BLD: 1.09 RATIO — SIGNIFICANT CHANGE UP (ref 0.88–1.16)
IRON SATN MFR SERPL: 20 UG/DL — LOW (ref 30–160)
IRON SATN MFR SERPL: 9 % — LOW (ref 14–50)
KETONES UR-MCNC: NEGATIVE — SIGNIFICANT CHANGE UP
LACTATE SERPL-SCNC: 1.9 MMOL/L — SIGNIFICANT CHANGE UP (ref 0.7–2)
LACTATE SERPL-SCNC: 2.7 MMOL/L — HIGH (ref 0.7–2)
LACTATE SERPL-SCNC: 2.8 MMOL/L — HIGH (ref 0.7–2)
LEUKOCYTE ESTERASE UR-ACNC: ABNORMAL
LIPID PNL WITH DIRECT LDL SERPL: 56 MG/DL — SIGNIFICANT CHANGE UP
LYMPHOCYTES # BLD AUTO: 1.16 K/UL — SIGNIFICANT CHANGE UP (ref 1–3.3)
LYMPHOCYTES # BLD AUTO: 1.49 K/UL — SIGNIFICANT CHANGE UP (ref 1–3.3)
LYMPHOCYTES # BLD AUTO: 11.1 % — LOW (ref 13–44)
LYMPHOCYTES # BLD AUTO: 14 % — SIGNIFICANT CHANGE UP (ref 13–44)
MAGNESIUM SERPL-MCNC: 2.2 MG/DL — SIGNIFICANT CHANGE UP (ref 1.6–2.6)
MCHC RBC-ENTMCNC: 26.2 PG — LOW (ref 27–34)
MCHC RBC-ENTMCNC: 26.9 PG — LOW (ref 27–34)
MCHC RBC-ENTMCNC: 32.5 GM/DL — SIGNIFICANT CHANGE UP (ref 32–36)
MCHC RBC-ENTMCNC: 32.5 GM/DL — SIGNIFICANT CHANGE UP (ref 32–36)
MCV RBC AUTO: 80.7 FL — SIGNIFICANT CHANGE UP (ref 80–100)
MCV RBC AUTO: 82.9 FL — SIGNIFICANT CHANGE UP (ref 80–100)
METHOD TYPE: SIGNIFICANT CHANGE UP
MONOCYTES # BLD AUTO: 0.32 K/UL — SIGNIFICANT CHANGE UP (ref 0–0.9)
MONOCYTES # BLD AUTO: 0.51 K/UL — SIGNIFICANT CHANGE UP (ref 0–0.9)
MONOCYTES NFR BLD AUTO: 3 % — SIGNIFICANT CHANGE UP (ref 2–14)
MONOCYTES NFR BLD AUTO: 4.9 % — SIGNIFICANT CHANGE UP (ref 2–14)
NEUTROPHILS # BLD AUTO: 8.59 K/UL — HIGH (ref 1.8–7.4)
NEUTROPHILS # BLD AUTO: 8.63 K/UL — HIGH (ref 1.8–7.4)
NEUTROPHILS NFR BLD AUTO: 71 % — SIGNIFICANT CHANGE UP (ref 43–77)
NEUTROPHILS NFR BLD AUTO: 82.4 % — HIGH (ref 43–77)
NITRITE UR-MCNC: NEGATIVE — SIGNIFICANT CHANGE UP
NON HDL CHOLESTEROL: 113 MG/DL — SIGNIFICANT CHANGE UP
NRBC # BLD: 0 /100 WBCS — SIGNIFICANT CHANGE UP (ref 0–0)
NRBC # BLD: SIGNIFICANT CHANGE UP /100 WBCS (ref 0–0)
NT-PROBNP SERPL-SCNC: 943 PG/ML — HIGH (ref 0–125)
PCO2 BLDA: 33 MMHG — SIGNIFICANT CHANGE UP (ref 32–35)
PH BLDA: 7.55 — HIGH (ref 7.35–7.45)
PH UR: 7 — SIGNIFICANT CHANGE UP (ref 5–8)
PLATELET # BLD AUTO: 252 K/UL — SIGNIFICANT CHANGE UP (ref 150–400)
PLATELET # BLD AUTO: 271 K/UL — SIGNIFICANT CHANGE UP (ref 150–400)
PO2 BLDA: 103 MMHG — SIGNIFICANT CHANGE UP (ref 83–108)
POTASSIUM SERPL-MCNC: 3.6 MMOL/L — SIGNIFICANT CHANGE UP (ref 3.5–5.3)
POTASSIUM SERPL-MCNC: 3.8 MMOL/L — SIGNIFICANT CHANGE UP (ref 3.5–5.3)
POTASSIUM SERPL-SCNC: 3.6 MMOL/L — SIGNIFICANT CHANGE UP (ref 3.5–5.3)
POTASSIUM SERPL-SCNC: 3.8 MMOL/L — SIGNIFICANT CHANGE UP (ref 3.5–5.3)
PROT SERPL-MCNC: 6.8 G/DL — SIGNIFICANT CHANGE UP (ref 6–8.3)
PROT SERPL-MCNC: 7.3 G/DL — SIGNIFICANT CHANGE UP (ref 6–8.3)
PROT UR-MCNC: 100
PROTEUS SP DNA BLD POS QL NAA+NON-PROBE: SIGNIFICANT CHANGE UP
PROTHROM AB SERPL-ACNC: 12.8 SEC — SIGNIFICANT CHANGE UP (ref 10.5–13.4)
RBC # BLD: 3.34 M/UL — LOW (ref 3.8–5.2)
RBC # BLD: 3.78 M/UL — LOW (ref 3.8–5.2)
RBC # FLD: 17.9 % — HIGH (ref 10.3–14.5)
RBC # FLD: 18.1 % — HIGH (ref 10.3–14.5)
RSV RNA NPH QL NAA+NON-PROBE: SIGNIFICANT CHANGE UP
SAO2 % BLDA: 99.6 % — HIGH (ref 94–98)
SARS-COV-2 RNA SPEC QL NAA+PROBE: SIGNIFICANT CHANGE UP
SODIUM SERPL-SCNC: 134 MMOL/L — LOW (ref 135–145)
SODIUM SERPL-SCNC: 137 MMOL/L — SIGNIFICANT CHANGE UP (ref 135–145)
SP GR SPEC: 1.01 — SIGNIFICANT CHANGE UP (ref 1.01–1.02)
SPECIMEN SOURCE: SIGNIFICANT CHANGE UP
T3 SERPL-MCNC: 41 NG/DL — LOW (ref 80–200)
T4 AB SER-ACNC: 3.3 UG/DL — LOW (ref 4.6–12)
TIBC SERPL-MCNC: 235 UG/DL — SIGNIFICANT CHANGE UP (ref 220–430)
TRIGL SERPL-MCNC: 283 MG/DL — HIGH
TROPONIN I, HIGH SENSITIVITY RESULT: 4.5 NG/L — SIGNIFICANT CHANGE UP
TSH SERPL-MCNC: 2.14 UIU/ML — SIGNIFICANT CHANGE UP (ref 0.36–3.74)
UIBC SERPL-MCNC: 215 UG/DL — SIGNIFICANT CHANGE UP (ref 110–370)
UROBILINOGEN FLD QL: NEGATIVE — SIGNIFICANT CHANGE UP
VIT B12 SERPL-MCNC: 816 PG/ML — SIGNIFICANT CHANGE UP (ref 232–1245)
WBC # BLD: 10.42 K/UL — SIGNIFICANT CHANGE UP (ref 3.8–10.5)
WBC # BLD: 10.65 K/UL — HIGH (ref 3.8–10.5)
WBC # FLD AUTO: 10.42 K/UL — SIGNIFICANT CHANGE UP (ref 3.8–10.5)
WBC # FLD AUTO: 10.65 K/UL — HIGH (ref 3.8–10.5)

## 2022-09-04 PROCEDURE — 93010 ELECTROCARDIOGRAM REPORT: CPT

## 2022-09-04 PROCEDURE — 71045 X-RAY EXAM CHEST 1 VIEW: CPT | Mod: 26

## 2022-09-04 PROCEDURE — 70450 CT HEAD/BRAIN W/O DYE: CPT | Mod: 26

## 2022-09-04 RX ORDER — CARBIDOPA AND LEVODOPA 25; 100 MG/1; MG/1
1 TABLET ORAL
Qty: 0 | Refills: 0 | DISCHARGE

## 2022-09-04 RX ORDER — ACETAMINOPHEN 500 MG
650 TABLET ORAL EVERY 6 HOURS
Refills: 0 | Status: DISCONTINUED | OUTPATIENT
Start: 2022-09-04 | End: 2022-09-16

## 2022-09-04 RX ORDER — POLYETHYLENE GLYCOL 3350 17 G/17G
34 POWDER, FOR SOLUTION ORAL
Qty: 0 | Refills: 0 | DISCHARGE

## 2022-09-04 RX ORDER — ONDANSETRON 8 MG/1
4 TABLET, FILM COATED ORAL
Qty: 0 | Refills: 0 | DISCHARGE

## 2022-09-04 RX ORDER — PREGABALIN 225 MG/1
1000 CAPSULE ORAL DAILY
Refills: 0 | Status: DISCONTINUED | OUTPATIENT
Start: 2022-09-04 | End: 2022-09-16

## 2022-09-04 RX ORDER — ALBUTEROL 90 UG/1
2 AEROSOL, METERED ORAL
Qty: 0 | Refills: 0 | DISCHARGE

## 2022-09-04 RX ORDER — INFLUENZA VIRUS VACCINE 15; 15; 15; 15 UG/.5ML; UG/.5ML; UG/.5ML; UG/.5ML
0.7 SUSPENSION INTRAMUSCULAR ONCE
Refills: 0 | Status: DISCONTINUED | OUTPATIENT
Start: 2022-09-04 | End: 2022-09-16

## 2022-09-04 RX ORDER — BACLOFEN 100 %
1 POWDER (GRAM) MISCELLANEOUS
Qty: 0 | Refills: 0 | DISCHARGE

## 2022-09-04 RX ORDER — MIDODRINE HYDROCHLORIDE 2.5 MG/1
1 TABLET ORAL
Qty: 0 | Refills: 0 | DISCHARGE

## 2022-09-04 RX ORDER — RANITIDINE HYDROCHLORIDE 150 MG/1
1 TABLET, FILM COATED ORAL
Qty: 0 | Refills: 0 | DISCHARGE

## 2022-09-04 RX ORDER — CARBIDOPA AND LEVODOPA 25; 100 MG/1; MG/1
1 TABLET ORAL THREE TIMES A DAY
Refills: 0 | Status: DISCONTINUED | OUTPATIENT
Start: 2022-09-04 | End: 2022-09-04

## 2022-09-04 RX ORDER — MIRTAZAPINE 45 MG/1
1 TABLET, ORALLY DISINTEGRATING ORAL
Qty: 0 | Refills: 0 | DISCHARGE

## 2022-09-04 RX ORDER — HEPARIN SODIUM 5000 [USP'U]/ML
5000 INJECTION INTRAVENOUS; SUBCUTANEOUS EVERY 12 HOURS
Refills: 0 | Status: DISCONTINUED | OUTPATIENT
Start: 2022-09-04 | End: 2022-09-05

## 2022-09-04 RX ORDER — OMEPRAZOLE 10 MG/1
1 CAPSULE, DELAYED RELEASE ORAL
Qty: 0 | Refills: 0 | DISCHARGE

## 2022-09-04 RX ORDER — MEROPENEM 1 G/30ML
1000 INJECTION INTRAVENOUS EVERY 12 HOURS
Refills: 0 | Status: DISCONTINUED | OUTPATIENT
Start: 2022-09-05 | End: 2022-09-06

## 2022-09-04 RX ORDER — ROTIGOTINE 8 MG/24H
1 PATCH, EXTENDED RELEASE TRANSDERMAL EVERY 24 HOURS
Refills: 0 | Status: DISCONTINUED | OUTPATIENT
Start: 2022-09-05 | End: 2022-09-16

## 2022-09-04 RX ORDER — MIDODRINE HYDROCHLORIDE 2.5 MG/1
5 TABLET ORAL THREE TIMES A DAY
Refills: 0 | Status: DISCONTINUED | OUTPATIENT
Start: 2022-09-04 | End: 2022-09-16

## 2022-09-04 RX ORDER — FAMOTIDINE 10 MG/ML
20 INJECTION INTRAVENOUS DAILY
Refills: 0 | Status: DISCONTINUED | OUTPATIENT
Start: 2022-09-04 | End: 2022-09-16

## 2022-09-04 RX ORDER — SERTRALINE 25 MG/1
2 TABLET, FILM COATED ORAL
Qty: 0 | Refills: 0 | DISCHARGE

## 2022-09-04 RX ORDER — SERTRALINE 25 MG/1
100 TABLET, FILM COATED ORAL DAILY
Refills: 0 | Status: DISCONTINUED | OUTPATIENT
Start: 2022-09-04 | End: 2022-09-16

## 2022-09-04 RX ORDER — PIPERACILLIN AND TAZOBACTAM 4; .5 G/20ML; G/20ML
3.38 INJECTION, POWDER, LYOPHILIZED, FOR SOLUTION INTRAVENOUS EVERY 8 HOURS
Refills: 0 | Status: DISCONTINUED | OUTPATIENT
Start: 2022-09-04 | End: 2022-09-04

## 2022-09-04 RX ORDER — LUBIPROSTONE 24 UG/1
1 CAPSULE, GELATIN COATED ORAL
Qty: 0 | Refills: 0 | DISCHARGE

## 2022-09-04 RX ORDER — CELECOXIB 200 MG/1
1 CAPSULE ORAL
Qty: 0 | Refills: 0 | DISCHARGE

## 2022-09-04 RX ORDER — SODIUM CHLORIDE 9 MG/ML
2100 INJECTION INTRAMUSCULAR; INTRAVENOUS; SUBCUTANEOUS ONCE
Refills: 0 | Status: COMPLETED | OUTPATIENT
Start: 2022-09-04 | End: 2022-09-04

## 2022-09-04 RX ORDER — BACLOFEN 100 %
2.5 POWDER (GRAM) MISCELLANEOUS EVERY 8 HOURS
Refills: 0 | Status: DISCONTINUED | OUTPATIENT
Start: 2022-09-04 | End: 2022-09-16

## 2022-09-04 RX ORDER — PIPERACILLIN AND TAZOBACTAM 4; .5 G/20ML; G/20ML
3.38 INJECTION, POWDER, LYOPHILIZED, FOR SOLUTION INTRAVENOUS ONCE
Refills: 0 | Status: COMPLETED | OUTPATIENT
Start: 2022-09-04 | End: 2022-09-04

## 2022-09-04 RX ORDER — POLYETHYLENE GLYCOL 3350 17 G/17G
17 POWDER, FOR SOLUTION ORAL DAILY
Refills: 0 | Status: DISCONTINUED | OUTPATIENT
Start: 2022-09-04 | End: 2022-09-16

## 2022-09-04 RX ORDER — FOLIC ACID 0.8 MG
1 TABLET ORAL DAILY
Refills: 0 | Status: DISCONTINUED | OUTPATIENT
Start: 2022-09-04 | End: 2022-09-16

## 2022-09-04 RX ORDER — VANCOMYCIN HCL 1 G
1000 VIAL (EA) INTRAVENOUS ONCE
Refills: 0 | Status: COMPLETED | OUTPATIENT
Start: 2022-09-04 | End: 2022-09-04

## 2022-09-04 RX ORDER — MEROPENEM 1 G/30ML
INJECTION INTRAVENOUS
Refills: 0 | Status: DISCONTINUED | OUTPATIENT
Start: 2022-09-04 | End: 2022-09-06

## 2022-09-04 RX ORDER — MEROPENEM 1 G/30ML
1000 INJECTION INTRAVENOUS ONCE
Refills: 0 | Status: COMPLETED | OUTPATIENT
Start: 2022-09-04 | End: 2022-09-04

## 2022-09-04 RX ORDER — MIRTAZAPINE 45 MG/1
45 TABLET, ORALLY DISINTEGRATING ORAL AT BEDTIME
Refills: 0 | Status: DISCONTINUED | OUTPATIENT
Start: 2022-09-04 | End: 2022-09-16

## 2022-09-04 RX ADMIN — CARBIDOPA AND LEVODOPA 1 TABLET(S): 25; 100 TABLET ORAL at 06:51

## 2022-09-04 RX ADMIN — HEPARIN SODIUM 5000 UNIT(S): 5000 INJECTION INTRAVENOUS; SUBCUTANEOUS at 06:40

## 2022-09-04 RX ADMIN — HEPARIN SODIUM 5000 UNIT(S): 5000 INJECTION INTRAVENOUS; SUBCUTANEOUS at 17:51

## 2022-09-04 RX ADMIN — POLYETHYLENE GLYCOL 3350 17 GRAM(S): 17 POWDER, FOR SOLUTION ORAL at 15:30

## 2022-09-04 RX ADMIN — MIDODRINE HYDROCHLORIDE 5 MILLIGRAM(S): 2.5 TABLET ORAL at 15:30

## 2022-09-04 RX ADMIN — SODIUM CHLORIDE 2100 MILLILITER(S): 9 INJECTION INTRAMUSCULAR; INTRAVENOUS; SUBCUTANEOUS at 01:04

## 2022-09-04 RX ADMIN — MIRTAZAPINE 45 MILLIGRAM(S): 45 TABLET, ORALLY DISINTEGRATING ORAL at 23:35

## 2022-09-04 RX ADMIN — MIDODRINE HYDROCHLORIDE 5 MILLIGRAM(S): 2.5 TABLET ORAL at 23:35

## 2022-09-04 RX ADMIN — MEROPENEM 100 MILLIGRAM(S): 1 INJECTION INTRAVENOUS at 23:34

## 2022-09-04 RX ADMIN — Medication 1 MILLIGRAM(S): at 15:30

## 2022-09-04 RX ADMIN — SERTRALINE 100 MILLIGRAM(S): 25 TABLET, FILM COATED ORAL at 15:30

## 2022-09-04 RX ADMIN — PIPERACILLIN AND TAZOBACTAM 200 GRAM(S): 4; .5 INJECTION, POWDER, LYOPHILIZED, FOR SOLUTION INTRAVENOUS at 01:04

## 2022-09-04 RX ADMIN — Medication 250 MILLIGRAM(S): at 01:47

## 2022-09-04 RX ADMIN — PIPERACILLIN AND TAZOBACTAM 25 GRAM(S): 4; .5 INJECTION, POWDER, LYOPHILIZED, FOR SOLUTION INTRAVENOUS at 17:06

## 2022-09-04 RX ADMIN — Medication 2.5 MILLIGRAM(S): at 23:35

## 2022-09-04 RX ADMIN — MIDODRINE HYDROCHLORIDE 5 MILLIGRAM(S): 2.5 TABLET ORAL at 06:51

## 2022-09-04 RX ADMIN — Medication 2.5 MILLIGRAM(S): at 15:30

## 2022-09-04 RX ADMIN — PREGABALIN 1000 MICROGRAM(S): 225 CAPSULE ORAL at 15:30

## 2022-09-04 RX ADMIN — PIPERACILLIN AND TAZOBACTAM 25 GRAM(S): 4; .5 INJECTION, POWDER, LYOPHILIZED, FOR SOLUTION INTRAVENOUS at 09:51

## 2022-09-04 RX ADMIN — FAMOTIDINE 20 MILLIGRAM(S): 10 INJECTION INTRAVENOUS at 15:30

## 2022-09-04 NOTE — PATIENT PROFILE ADULT - HAS THE PATIENT RECEIVED THE INFLUENZA VACCINE THIS SEASON?
Occupational Therapy Evaluation     Patient Name: Kay Azevedo  QNSTJ'A Date: 2/18/2022  Problem List  Principal Problem:    Stroke-like symptom  Active Problems:    Essential hypertension    H/O: stroke    Central retinal artery occlusion of right eye    Past Medical History  Past Medical History:   Diagnosis Date    Hypertension     Stroke [facility])      Past Surgical History  History reviewed  No pertinent surgical history  02/18/22 1130   Note Type   Note type Evaluation   Restrictions/Precautions   Other Precautions Chair Alarm; Bed Alarm; Fall Risk;Visual impairment;Telemetry;Multiple lines   Pain Assessment   Pain Assessment Tool 0-10   Pain Score No Pain   Home Living   Type of Home House  (1 ANALY)   Home Layout Two level;Performs ADLs on one level; Able to live on main level with bedroom/bathroom  (FF to 2nd floor)   Bathroom Shower/Tub Tub/shower unit   Bathroom Toilet Standard   Bathroom Equipment Shower chair;Grab bars in shower;Commode  (did not use shower chair or commode PTA)   [address] [facility]  (did not use PTA)   Additional Comments Pt reports living [place] 2 story home with 1 ANALY  Pt has a full 1st floor set-up  Pt sleeps in recliner chair and uses no AD for UB support  Prior Function   Level of [place] Independent with ADLs and functional mobility   Lives With Alone   Receives Help From Family   ADL Assistance Independent   IADLs Independent   Falls in the last 6 months 0   Vocational Retired   Comments Pt reports completing ADLs, IADLs, functional ambulation and community mobility + driving @ I  Pt reprots her children live close and can assist PRN   ADL   Where Assessed Edge of bed   Grooming Assistance 5  Supervision/Setup   Grooming Deficit Setup;Verbal cueing;Supervision/safety; Increased time to complete   UB Dressing Assistance 5  Supervision/Setup   UB Dressing Deficit Setup   LB Dressing Assistance   ([address] )   [address] device for steadying;Verbal cueing;Supervision/safety; Increased time to complete; Don/doff R sock; Don/doff L sock; Thread RLE into pants; Thread LLE into pants;Pull up over hips   Additional Comments Pt completing ADL tasks while seated at EOB  UB Dressing and grooming tasks @ S after se-tup with increased time and initial vc'ing to assist with locating items  Pt completing LB dressing including socks/pants around feet and CM around waist @ [address]  for balance and safety during CM around waist    Bed Mobility   Supine to Sit 6  Modified independent   Additional items Bedrails;HOB elevated   Additional Comments Pt reports sleeping in recliner chair at home    Transfers   Sit to Stand   (SBA)   Additional items Increased time required;Verbal cues   Stand to Sit   (SBA)   Additional items Increased time required;Verbal cues   Stand pivot   (SBA)   Additional items Increased time required;Verbal cues   Additional Comments Pt completing functional transfers with use of SPC for UB support  vc'ing for safety and proper use PRN   Balance   Static Sitting Good   Dynamic Sitting Fair +   Static Standing Fair   Dynamic Standing Fair -   Activity Tolerance   Activity Tolerance Patient limited by fatigue   Medical Staff Made Aware Spoke with PT, Fabiana Menard   Nurse Made Aware Spoke with RN, Maria Del Rosario Nuno Assessment   RUE Assessment WFL   LUE Assessment   LUE Assessment WFL   Hand Function   Gross Motor Coordination Functional   Fine Motor Coordination Functional   Sensation   Light Touch No apparent deficits   Vision - Complex Assessment   Additional Comments Pt reports legally blind in L eye from previous CVA  Pt now has R eye deficits from current CVA symptoms  Pt reports "i can only see certain things, i can see your mask but i cant see your eyes"  Pt having a difficult time describing visual change although appears to be fairly significant at this time   Will cotninue to assess as able   Cognition   Overall Cognitive Status [facility] Arousal/Participation Alert; Responsive; Cooperative   Attention Attends with cues to redirect   Orientation Level Oriented X4   Memory Within functional limits   Following Commands Follows one step commands without difficulty   Assessment   Limitation Decreased ADL status; Decreased UE strength;Decreased Safe judgement during ADL;Decreased endurance;Visual deficit; Decreased self-care trans;Decreased high-level ADLs   Prognosis Good   Assessment Pt is a 78 y o  female, admitted to [address] [place] [address] 2/17/2022 d/t experiencing R eye vision changes  Dx: stroke-like symptoms  Pt with PMHx impacting their performance during ADL tasks, including: HTN, CVA  Prior to admission to the hospital Pt was performing ADLs without physical assistance  IADLs without physical assistance  Functional transfers/ambulation without physical assistance  Cognitive status was PTA was intact  OT order placed to assess Pt's ADLs, cognitive status, and performance during functional tasks in order to maximize safety and independence while making most appropriate d/c recommendations  Pt's clinical presentation is currently unstable/unpredictable given new onset deficits that effect Pt's occupational performance and ability to safely return to [facility] including decrease activity tolerance, decrease standing balance, decrease performance during ADL tasks, decrease safety awareness , decrease UB MS, decrease generalized strength, decrease activity engagement, decrease performance during functional transfers, high fall risk and limited insight to deficits combined with medical complications of hypertension , increased RR, impulsivity during admission, need for input for mobility technique/safety and new visual changes, recieved tPA   Personal factors affecting Pt at time of initial evaluation include: advanced age, inability to perform current job functions, inability to perform IADLs, inability to perform ADLs, new need for AD, inability to ambulate household distances, inability to navigate community distances, limited insight into impairments, decreased initiation and engagement and questionable non-compliance  Pt will benefit from continued skilled OT services to address deficits as defined above and to maximize level independence/participation during ADLs and functional tasks to facilitate return toward PLOF and improved quality of life  From an occupational therapy standpoint, recommendation at time of d/c would be [address] [place] [address] with increased family support  Plan   Treatment Interventions ADL retraining;Functional transfer training;Visual perceptual retraining;UE strengthening/ROM; Endurance training;Patient/family training;Equipment evaluation/education; Neuromuscular reeducation; Compensatory technique education;Continued evaluation; Energy conservation; Activityengagement   Goal Expiration Date 03/04/22   OT Frequency 3-5x/wk   Recommendation   OT Discharge Recommendation Home with home health rehabilitation   [facility] Daily [facility]   Lower Body Dressing 3   Bathing 3   Toileting 3   Upper Body Dressing 3   Grooming 3   Eating 4   Daily Activity Raw Score 19   Daily Activity Standardized Score (Calc for Raw Score >=11) 40 22   [facility] Applied Cognition Inpatient   Following a Speech/Presentation 4   Understanding Ordinary Conversation 4   Taking Medications 3   Remembering Where Things Are Placed or Put Away 4   Remembering List of 4-5 Errands 3   Taking Care of Complicated Tasks 4   Applied Cognition Raw Score 22   Applied Cognition Standardized Score 47 83     The patient's raw score on the AM-PAC Daily Activity inpatient short form is 19, standardized score is 40 22, greater than 39 4  Patients at this level are likely to benefit from DC to home  Please refer to the recommendation of the Occupational Therapist for safe DC planning  Pt goals to be met by 3/4/2022    1   Pt will demonstrate ability to complete LB dressing @ Mod I in order to increase safety and independence during meaningful tasks  2  Pt will demonstrate ability to brian/doff socks/shoes while sitting EOB @ Mod I in order to increase safety and independence during meaningful tasks  3  Pt will demonstrate ability to complete toileting tasks including CM and pericare @ Mod I in order to increase safety and independence during meaningful tasks  4  Pt will demonstrate ability to complete EOB, chair, toilet/commode transfers @ Mod I in order to increase performance and participation during functional tasks  5  Pt will demonstrate ability to stand for 10+ minutes while maintaining G balance with use of least restrictive device for UB support PRN  6  Pt will demonstrate ability to tolerate 30-35 minute OT session with no vc'ing for deep breathing or use of energy conservation techniques in order to increase activity tolerance during functional tasks  7  Pt will demonstrate Good carryover of use of energy conservation/compensatory strategies during ADLs and functional tasks in order to increase safety and reduce risk for falls  8  Pt will demonstrate Good attention and participation in continued evaluation of functional ambulation house hold distances in order to assist with safe d/c planning  9  Pt will attend to continued cognitive assessments 100% of the time in order to provide most appropriate d/c recommendations  10  Pt will follow 100% simple 2-step commands and be A&O x4 consistently with environmental cues to increase participation in functional activities  11  Pt will identify 3 areas of interest/hobbies and 1 intervention on how to incorporate into daily life in order to increase interaction with environment and peers as well as increase participation in meaningful tasks  12  Pt will demonstrate 100% carryover of BUE HEP in order to increase BUE MS and increase performance during functional tasks upon d/c home        Alex Lua OTR/L no...

## 2022-09-04 NOTE — PATIENT PROFILE ADULT - NSPROIMPLANTSMEDDEV_GEN_A_NUR
Bilateral brain stimulater Bilateral brain stimulater  Duopa- internal implant- stomach- there medication box on the outside./Feeding tube/Gastrostomy

## 2022-09-04 NOTE — ED ADULT NURSE REASSESSMENT NOTE - NS ED NURSE REASSESS COMMENT FT1
regarding patients Duoppa order pt has J-tube in place with medication cartridge administering medication Pharmacist and MD have clarified orders and patients  may change medication cartridge but must notify staff prior to doing so next change to take place tonight

## 2022-09-04 NOTE — ED ADULT NURSE REASSESSMENT NOTE - NS ED NURSE REASSESS COMMENT FT1
report from previous shift patient admitted awaiting bed availability pt has medication pump infusing carbodopa/levodopa via [employer] Dr Long called who will verify order with pharmacist patient appears sleeping pt is unresponsive at present pt has DTI both heels booties remain in place patient DTI on sacrum and small unstageble wound left great toe

## 2022-09-04 NOTE — ED ADULT NURSE REASSESSMENT NOTE - NS ED NURSE REASSESS COMMENT FT1
Pt had Bowel movement> was cleaned up. DTI to sacral area and bilateral heels, left great toe. A generous amount of moisturizer  applied to all areas. Carbidopa pump infusing as ordered. secured to abdomen

## 2022-09-04 NOTE — ED ADULT NURSE REASSESSMENT NOTE - NS ED NURSE REASSESS COMMENT FT1
Patient noted to have Carbidopa/Levodopa pump noted to her left upper quadrant of her abdomen. The j tube seems to be patent and is infusing the enteral suspension at 3mL per hour via an electronic pump/bio med device. Per the patient's , he changes the medication cartridges himself q12h as the medication cannot be left outside of a refridgerator for more than 12 hours. This was confirmed by Himanshu from pharmacy. Nursing Supervision (Kori) was made aware of the situation, as in-house pharmacy does not stock the medication in this form, and there are not conversion or IVPB options to replace with. The patient's  confirms that he receives the medication from a specialty pharmacy. MD Afsaneh Long was made aware. Patient noted to have Carbidopa/Levodopa pump noted to her left upper quadrant of her abdomen. The j tube seems to be patent and is infusing the enteral suspension at 3mL per hour via an electronic pump. Per the patient's , he changes the medication cartridges himself q12h as the medication cannot be left outside of a refrigerator for more than 15 hours. This was confirmed by Himanshu from pharmacy.  The patient's  also confirms that he receives the medication from a specialty pharmacy. Nursing Supervision (Kori) was made aware of the situation, as in-house pharmacy does not stock the medication in this form, there are no PO conversion or IVPB options to replace it with, and nursing personnel is not trained on the operation of this bio-medical device. MD Afsaneh Long was made aware. MD, RN, ANM, Pharmacy and Nursing Supervision are all aware that this patient will be brought in by the , verified and stored by pharmacy, and the cartridges will be replaced by the patient's  q12h under the supervision of an RN at the bedside for the duration of the patient's admission. Patient noted to have Carbidopa/Levodopa pump noted to her left upper quadrant of her abdomen. The j tube seems to be patent and is infusing the enteral suspension at 3mL per hour via an electronic pump. Per the patient's , he changes the medication cartridges himself q12h as the medication cannot be left outside of a refrigerator for more than 15 hours. This was confirmed by Himanshu from pharmacy.  The patient's  also confirms that he receives the medication from a specialty pharmacy. Nursing Supervision (Kori) was made aware of the situation, as in-house pharmacy does not stock the medication in this form, there are no PO conversion or IVPB options to replace it with, and nursing personnel is not trained on the operation of this bio-medical device. MD Afsaneh Long was made aware. MD, RN, ANM, Pharmacy and Nursing Supervision are all aware that this patient will be brought in by the , verified and stored by pharmacy, and the cartridges will be replaced by the patient's  q12h under the supervision of an RN at the bedside for the duration of the patient's admission. Cartridge was changed this afternoon by  and he states he will return at 10pm to change the cartridge again.    Patient also has a Neupro patch that will be brought in by the patient's  as in house pharmacy does not carry it. This patch will be stored with in house pharmacy and administered by RNs. Patch was changed this AM by RN and is good for 24 hours. Patient noted to have Carbidopa/Levodopa pump noted to her left upper quadrant of her abdomen. The j tube seems to be patent and is infusing the enteral suspension at 3mL per hour via an electronic pump. Per the patient's , he changes the medication cartridges himself q12h as the medication cannot be left outside of a refrigerator for more than 15 hours. This was confirmed by Himanshu from pharmacy.  The patient's  also confirms that he receives the medication from a specialty pharmacy. Nursing Supervision (Kori) was made aware of the situation, as in-house pharmacy does not stock the medication in this form, there are no PO conversion or IVPB options to replace it with, and nursing personnel is not trained on the operation of this bio-medical device. MD Afsaneh Long was made aware. MD, RN, ANM, Pharmacy and Nursing Supervision are all aware that this patient's medication will be brought in by the , verified and stored by pharmacy, and the cartridges will be replaced by the patient's  q12h under the supervision of an RN at the bedside for the duration of the patient's admission. Cartridge was changed this afternoon by  and he states he will return at 10pm to change the cartridge again.    Patient also has a Neupro patch that will be brought in by the patient's  as in house pharmacy does not carry it. This patch will be stored with in house pharmacy and administered by RNs. Patch was changed this AM by RN and is good for 24 hours.

## 2022-09-04 NOTE — H&P ADULT - HISTORY OF PRESENT ILLNESS
MATEO LESLIE is a 72y [facility].   Patient is a terminal case of PD.   At ED arrival her O2 sat was 72%.  Patient DNR/DNI started on 100% NRBM and her saturation improved.   Patient is non communicative most of informations received from [facility] facility and from spouse.  Patient has PEG/JT and on feeding and Parkinson's Disease medications.

## 2022-09-04 NOTE — ED ADULT NURSE NOTE - NSICDXPASTMEDICALHX_GEN_ALL_CORE_FT
PAST MEDICAL HISTORY:  Basal cell carcinoma scalp    Constipation     Depression     GERD (gastroesophageal reflux disease)     Parkinson's disease     Seasonal allergies     Unilateral primary osteoarthritis, left hip

## 2022-09-04 NOTE — ED ADULT NURSE NOTE - OBJECTIVE STATEMENT
patient lethargic but calm, as per , patient has altered mental status from her normal state.  patient  adds that patient had UTI within the past few months.  arrived with jimenez catheter in place, not draining, new jimenez placed and drained 500 cc "mucous" consistency urine.  patient tolerating non-rebreather well once in ED room at 95%

## 2022-09-04 NOTE — H&P ADULT - NSHPSOURCEINFORD_GEN_ALL_CORE
Chart(s) Chart(s)/Spouse/Significant Other/Physician/Provider/Other Healthcare Facility Chart(s)/Patient/Spouse/Significant Other/Physician/Provider/Other Healthcare Facility

## 2022-09-04 NOTE — ED PROVIDER NOTE - OBJECTIVE STATEMENT
73 y/o F with hx of PD sent in from [facility] due to increased work of breathing and change in mental status.  In ED pt somnolent with O2 sat 72%

## 2022-09-04 NOTE — H&P ADULT - NSICDXPASTMEDICALHX_GEN_ALL_CORE_FT
PAST MEDICAL HISTORY:  Acute respiratory failure with hypoxia     Basal cell carcinoma scalp    Constipation     Depression     GERD (gastroesophageal reflux disease)     Parkinson's disease     Seasonal allergies     Unilateral primary osteoarthritis, left hip

## 2022-09-04 NOTE — H&P ADULT - NSHPLABSRESULTS_GEN_ALL_CORE
9.9    10.65 )-----------( 271      ( 04 Sep 2022 01:03 )             30.5     04 Sep 2022 01:03    134    |  93     |  95     ----------------------------<  108    3.8     |  27     |  1.50     Ca    9.0        04 Sep 2022 01:03    TPro  7.3    /  Alb  2.3    /  TBili  0.4    /  DBili  x      /  AST  57     /  ALT  26     /  AlkPhos  202    04 Sep 2022 01:03    LIVER FUNCTIONS - ( 04 Sep 2022 01:03 )  Alb: 2.3 g/dL / Pro: 7.3 g/dL / ALK PHOS: 202 U/L / ALT: 26 U/L / AST: 57 U/L / GGT: x           PT/INR - ( 04 Sep 2022 01:03 )   PT: 12.8 sec;   INR: 1.09 ratio         PTT - ( 04 Sep 2022 01:03 )  PTT:26.9 sec  CAPILLARY BLOOD GLUCOSE    Urinalysis Basic - ( 04 Sep 2022 01:00 )    Color: Yellow / Appearance: mucous / S.010 / pH: x  Gluc: x / Ketone: Negative  / Bili: Negative / Urobili: Negative   Blood: x / Protein: 100 / Nitrite: Negative   Leuk Esterase: Moderate / RBC: 3-5 /HPF / WBC 11-25   Sq Epi: x / Non Sq Epi: Occasional / Bacteria: Many

## 2022-09-04 NOTE — ED PROVIDER NOTE - NSICDXPASTSURGICALHX_GEN_ALL_CORE_FT
PAST SURGICAL HISTORY:  Abdominal hernia with mesh, 2003    Basal cell carcinoma removal, mid vertex scalp, 2002    H/O colonoscopy , 2008 polypectomy, ,     H/O ovarian cystectomy right,     History of  10/1982    S/P deep brain stimulator placement 2006, 2 brain pacemakers Model #[id number], batteries replaced 2014
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
## 2022-09-05 LAB
-  COAGULASE NEGATIVE STAPHYLOCOCCUS: SIGNIFICANT CHANGE UP
-  CTX-M RESISTANCE MARKER: SIGNIFICANT CHANGE UP
-  ESBL: SIGNIFICANT CHANGE UP
ALBUMIN SERPL ELPH-MCNC: 2 G/DL — LOW (ref 3.3–5)
ALP SERPL-CCNC: 185 U/L — HIGH (ref 40–120)
ALT FLD-CCNC: 11 U/L — LOW (ref 12–78)
ANION GAP SERPL CALC-SCNC: 10 MMOL/L — SIGNIFICANT CHANGE UP (ref 5–17)
AST SERPL-CCNC: 28 U/L — SIGNIFICANT CHANGE UP (ref 15–37)
BASOPHILS # BLD AUTO: 0.04 K/UL — SIGNIFICANT CHANGE UP (ref 0–0.2)
BASOPHILS NFR BLD AUTO: 0.4 % — SIGNIFICANT CHANGE UP (ref 0–2)
BILIRUB SERPL-MCNC: 0.6 MG/DL — SIGNIFICANT CHANGE UP (ref 0.2–1.2)
BUN SERPL-MCNC: 57 MG/DL — HIGH (ref 7–23)
CALCIUM SERPL-MCNC: 8.8 MG/DL — SIGNIFICANT CHANGE UP (ref 8.5–10.1)
CHLORIDE SERPL-SCNC: 105 MMOL/L — SIGNIFICANT CHANGE UP (ref 96–108)
CO2 SERPL-SCNC: 25 MMOL/L — SIGNIFICANT CHANGE UP (ref 22–31)
CREAT SERPL-MCNC: 0.65 MG/DL — SIGNIFICANT CHANGE UP (ref 0.5–1.3)
EGFR: 92 ML/MIN/1.73M2 — SIGNIFICANT CHANGE UP
EOSINOPHIL # BLD AUTO: 0.13 K/UL — SIGNIFICANT CHANGE UP (ref 0–0.5)
EOSINOPHIL NFR BLD AUTO: 1.2 % — SIGNIFICANT CHANGE UP (ref 0–6)
GLUCOSE SERPL-MCNC: 85 MG/DL — SIGNIFICANT CHANGE UP (ref 70–99)
GRAM STN FLD: SIGNIFICANT CHANGE UP
GRAM STN FLD: SIGNIFICANT CHANGE UP
HCT VFR BLD CALC: 24.5 % — LOW (ref 34.5–45)
HCV AB S/CO SERPL IA: 0.15 S/CO — SIGNIFICANT CHANGE UP (ref 0–0.99)
HCV AB SERPL-IMP: SIGNIFICANT CHANGE UP
HGB BLD-MCNC: 7.9 G/DL — LOW (ref 11.5–15.5)
IMM GRANULOCYTES NFR BLD AUTO: 1.4 % — SIGNIFICANT CHANGE UP (ref 0–1.5)
LYMPHOCYTES # BLD AUTO: 1.21 K/UL — SIGNIFICANT CHANGE UP (ref 1–3.3)
LYMPHOCYTES # BLD AUTO: 11.1 % — LOW (ref 13–44)
MAGNESIUM SERPL-MCNC: 2.3 MG/DL — SIGNIFICANT CHANGE UP (ref 1.6–2.6)
MCHC RBC-ENTMCNC: 26.2 PG — LOW (ref 27–34)
MCHC RBC-ENTMCNC: 32.2 GM/DL — SIGNIFICANT CHANGE UP (ref 32–36)
MCV RBC AUTO: 81.4 FL — SIGNIFICANT CHANGE UP (ref 80–100)
METHOD TYPE: SIGNIFICANT CHANGE UP
MONOCYTES # BLD AUTO: 0.56 K/UL — SIGNIFICANT CHANGE UP (ref 0–0.9)
MONOCYTES NFR BLD AUTO: 5.2 % — SIGNIFICANT CHANGE UP (ref 2–14)
NEUTROPHILS # BLD AUTO: 8.77 K/UL — HIGH (ref 1.8–7.4)
NEUTROPHILS NFR BLD AUTO: 80.7 % — HIGH (ref 43–77)
NRBC # BLD: 0 /100 WBCS — SIGNIFICANT CHANGE UP (ref 0–0)
PHOSPHATE SERPL-MCNC: 4.1 MG/DL — SIGNIFICANT CHANGE UP (ref 2.5–4.5)
PLATELET # BLD AUTO: 279 K/UL — SIGNIFICANT CHANGE UP (ref 150–400)
POTASSIUM SERPL-MCNC: 3.3 MMOL/L — LOW (ref 3.5–5.3)
POTASSIUM SERPL-SCNC: 3.3 MMOL/L — LOW (ref 3.5–5.3)
PROT SERPL-MCNC: 6.6 G/DL — SIGNIFICANT CHANGE UP (ref 6–8.3)
PROTEUS SP DNA BLD POS QL NAA+NON-PROBE: SIGNIFICANT CHANGE UP
RBC # BLD: 3.01 M/UL — LOW (ref 3.8–5.2)
RBC # FLD: 17.9 % — HIGH (ref 10.3–14.5)
SODIUM SERPL-SCNC: 140 MMOL/L — SIGNIFICANT CHANGE UP (ref 135–145)
SPECIMEN SOURCE: SIGNIFICANT CHANGE UP
T3 SERPL-MCNC: 48 NG/DL — LOW (ref 80–200)
T4 AB SER-ACNC: 4.1 UG/DL — LOW (ref 4.6–12)
TSH SERPL-MCNC: 2.84 UIU/ML — SIGNIFICANT CHANGE UP (ref 0.36–3.74)
WBC # BLD: 10.86 K/UL — HIGH (ref 3.8–10.5)
WBC # FLD AUTO: 10.86 K/UL — HIGH (ref 3.8–10.5)

## 2022-09-05 PROCEDURE — 93970 EXTREMITY STUDY: CPT | Mod: 26

## 2022-09-05 RX ORDER — ENOXAPARIN SODIUM 100 MG/ML
70 INJECTION SUBCUTANEOUS EVERY 12 HOURS
Refills: 0 | Status: DISCONTINUED | OUTPATIENT
Start: 2022-09-05 | End: 2022-09-16

## 2022-09-05 RX ADMIN — POLYETHYLENE GLYCOL 3350 17 GRAM(S): 17 POWDER, FOR SOLUTION ORAL at 12:06

## 2022-09-05 RX ADMIN — SERTRALINE 100 MILLIGRAM(S): 25 TABLET, FILM COATED ORAL at 12:06

## 2022-09-05 RX ADMIN — MIDODRINE HYDROCHLORIDE 5 MILLIGRAM(S): 2.5 TABLET ORAL at 12:06

## 2022-09-05 RX ADMIN — MEROPENEM 100 MILLIGRAM(S): 1 INJECTION INTRAVENOUS at 12:06

## 2022-09-05 RX ADMIN — Medication 2.5 MILLIGRAM(S): at 05:34

## 2022-09-05 RX ADMIN — Medication 1 MILLIGRAM(S): at 12:07

## 2022-09-05 RX ADMIN — HEPARIN SODIUM 5000 UNIT(S): 5000 INJECTION INTRAVENOUS; SUBCUTANEOUS at 05:35

## 2022-09-05 RX ADMIN — Medication 2.5 MILLIGRAM(S): at 22:47

## 2022-09-05 RX ADMIN — Medication 650 MILLIGRAM(S): at 12:07

## 2022-09-05 RX ADMIN — Medication 2.5 MILLIGRAM(S): at 12:07

## 2022-09-05 RX ADMIN — MIRTAZAPINE 45 MILLIGRAM(S): 45 TABLET, ORALLY DISINTEGRATING ORAL at 22:47

## 2022-09-05 RX ADMIN — ROTIGOTINE 1 PATCH: 8 PATCH, EXTENDED RELEASE TRANSDERMAL at 10:41

## 2022-09-05 RX ADMIN — MEROPENEM 100 MILLIGRAM(S): 1 INJECTION INTRAVENOUS at 17:03

## 2022-09-05 RX ADMIN — MIDODRINE HYDROCHLORIDE 5 MILLIGRAM(S): 2.5 TABLET ORAL at 05:35

## 2022-09-05 RX ADMIN — FAMOTIDINE 20 MILLIGRAM(S): 10 INJECTION INTRAVENOUS at 12:06

## 2022-09-05 RX ADMIN — ENOXAPARIN SODIUM 70 MILLIGRAM(S): 100 INJECTION SUBCUTANEOUS at 17:03

## 2022-09-05 RX ADMIN — PREGABALIN 1000 MICROGRAM(S): 225 CAPSULE ORAL at 12:07

## 2022-09-05 RX ADMIN — ROTIGOTINE 1 PATCH: 8 PATCH, EXTENDED RELEASE TRANSDERMAL at 22:36

## 2022-09-05 RX ADMIN — MIDODRINE HYDROCHLORIDE 5 MILLIGRAM(S): 2.5 TABLET ORAL at 17:03

## 2022-09-05 RX ADMIN — Medication 650 MILLIGRAM(S): at 13:30

## 2022-09-05 NOTE — CONSULT NOTE ADULT - ASSESSMENT
[facility]  Pt well know to our group from care at [facility]    Chart Reviewed-Full Consult to follow for any immediate concerns please fell free to contact us directly at  [phone] and have us paged or text my cell # [phone]  Cash Saini MD PhD   [facility]  Pt well know to our group from care at [facility]     72y Female transferred from [facility] facility of lethargy and hypoxia.   Patient is a terminal case of PD.   At ED arrival her O2 sat was 72%.  Patient DNR/DNI started on 100% NRBM and her saturation improved.   Patient is non communicative most of informations received from [facility] facility and from spouse.  Patient has PEG/JT and on feeding and Parkinson's Disease medications.    RECOMMENDATIONS    PT with acute respiratory distress and on imaging suggestion of LLL PNA. With antibiotic intolerances meropenem reasonable in this context with anticipated 5-7 day duration first day 9/5 but of note pt did receive Zosyn in ER and appears to have tolerated.    All interventions in keeping with goals of care     Thank you for consulting us and involving us in the management of this most interesting and challenging case.  We will follow along in the care of this patient. Please call us at [phone] or text me directly on my cell# at [phone] with any concerns.

## 2022-09-05 NOTE — CONSULT NOTE ADULT - ASSESSMENT
72y [facility].    PD  Hypoxemia  Effusion  elev D dimer  Frail  Weak  Elderly  OP  OA      ID eval in progress  Blood cx noted  labs and imaging reviewed  elev D dimer -   will check CTA chest - eval PE - Eff - Atelectasis - Sepsis - Hypoxemia  HOB elev  asp prec  oral hygiene  pt has a PEG  pt has end stage Parkinson's disease - assist with needs - ADL - Fall prec - GOC - Pt is DNR  monitor VS and Sat  o2 support as needed - keep sat > 88 pct  prognosis guarded

## 2022-09-05 NOTE — PROGRESS NOTE ADULT - SUBJECTIVE AND OBJECTIVE BOX
Patient is a 72y old  Female who presents with a chief complaint of Lethargic and hypoxic (05 Sep 2022 07:17)      INTERVAL OVER NIGHT EVENTS:    MEDICATIONS  (STANDING):  baclofen 2.5 milliGRAM(s) Oral every 8 hours  cyanocobalamin 1000 MICROGram(s) Oral daily  Duopa Enteral Suspension 1 Bottle,carbidopa 4.63mg/levodopa 20mg per mL 72 mL/Day 72 mL/Day Enteral Tube Continuous Pump  famotidine    Tablet 20 milliGRAM(s) Oral daily  folic acid 1 milliGRAM(s) Oral daily  heparin   Injectable 5000 Unit(s) SubCutaneous every 12 hours  influenza  Vaccine (HIGH DOSE) 0.7 milliLiter(s) IntraMuscular once  meropenem  IVPB      meropenem  IVPB 1000 milliGRAM(s) IV Intermittent every 12 hours  midodrine. 5 milliGRAM(s) Oral three times a day  mirtazapine 45 milliGRAM(s) Oral at bedtime  polyethylene glycol 3350 17 Gram(s) Oral daily  rotigotine patch 4 mG/24 Hr(s) 1 Patch Transdermal every 24 hours  sertraline 100 milliGRAM(s) Oral daily    MEDICATIONS  (PRN):  acetaminophen     Tablet .. 650 milliGRAM(s) Oral every 6 hours PRN Temp greater or equal to 38C (100.4F), Moderate Pain (4 - 6)      Allergies    Ceclor (Rash)  latex (Rash; Urticaria)    Intolerances        REVIEW OF SYSTEMS:  CONSTITUTIONAL: No fever, weight loss, or fatigue  EYES: No eye pain, visual disturbances, or discharge  ENMT:  No difficulty hearing, tinnitus, vertigo; No sinus or throat pain  NECK: No pain or stiffness  BREASTS: No pain, masses, or nipple discharge  RESPIRATORY: No cough, wheezing, chills or hemoptysis; No shortness of breath  CARDIOVASCULAR: No chest pain, palpitations, dizziness, or leg swelling  GASTROINTESTINAL: No abdominal or epigastric pain. No nausea, vomiting, or hematemesis; No diarrhea or constipation. No melena or hematochezia.  GENITOURINARY: No dysuria, frequency, hematuria, or incontinence  NEUROLOGICAL: No headaches, memory loss, loss of strength, numbness, or tremors  SKIN: No itching, burning, rashes, or lesions   LYMPH NODES: No enlarged glands  ENDOCRINE: No heat or cold intolerance; No hair loss  MUSCULOSKELETAL: No joint pain or swelling; No muscle, back, or extremity pain  PSYCHIATRIC: No depression, anxiety, mood swings, or difficulty sleeping  HEME/LYMPH: No easy bruising, or bleeding gums  ALLERGY AND IMMUNOLOGIC: No hives or eczema    Vital Signs Last 24 Hrs  T(C): 37.2 (05 Sep 2022 05:37), Max: 37.2 (05 Sep 2022 05:37)  T(F): 98.9 (05 Sep 2022 05:37), Max: 98.9 (05 Sep 2022 05:37)  HR: 90 (05 Sep 2022 05:37) (90 - 100)  BP: 92/56 (05 Sep 2022 05:37) (91/54 - 98/54)  BP(mean): --  RR: 18 (05 Sep 2022 05:37) (18 - 19)  SpO2: 90% (05 Sep 2022 05:37) (90% - 97%)    Parameters below as of 05 Sep 2022 05:37  Patient On (Oxygen Delivery Method): nasal cannula        PHYSICAL EXAM:  GENERAL: NAD, well-groomed, well-developed  HEAD:  Atraumatic, Normocephalic  EYES: EOMI, PERRLA, conjunctiva and sclera clear  ENMT: No tonsillar erythema, exudates, or enlargement; Moist mucous membranes, Good dentition, No lesions  NECK: Supple, No JVD, Normal thyroid  NERVOUS SYSTEM:  Alert & Oriented X3, Motor Strength 5/5 B/L upper and lower extremities; DTRs 2+ intact and symmetric  CHEST/LUNG: Clear to percussion bilaterally; No rales, rhonchi, wheezing, or rubs  HEART: Regular rate and rhythm; No murmurs, rubs, or gallops  ABDOMEN: Soft, Nontender, Nondistended; Bowel sounds present  EXTREMITIES:  2+ Peripheral Pulses, No clubbing, cyanosis, or edema  LYMPH: No lymphadenopathy noted  SKIN: No rashes or lesions    LABS:                        7.9    10.86 )-----------( 279      ( 05 Sep 2022 06:51 )             24.5     09-05    140  |  105  |  57<H>  ----------------------------<  85  3.3<L>   |  25  |  0.65    Ca    8.8      05 Sep 2022 06:51  Phos  4.1     09-05  Mg     2.3     -    TPro  6.6  /  Alb  2.0<L>  /  TBili  0.6  /  DBili  x   /  AST  28  /  ALT  11<L>  /  AlkPhos  185<H>  -    PT/INR - ( 04 Sep 2022 01:03 )   PT: 12.8 sec;   INR: 1.09 ratio         PTT - ( 04 Sep 2022 01:03 )  PTT:26.9 sec  Urinalysis Basic - ( 04 Sep 2022 01:00 )    Color: Yellow / Appearance: mucous / S.010 / pH: x  Gluc: x / Ketone: Negative  / Bili: Negative / Urobili: Negative   Blood: x / Protein: 100 / Nitrite: Negative   Leuk Esterase: Moderate / RBC: 3-5 /HPF / WBC 11-25   Sq Epi: x / Non Sq Epi: Occasional / Bacteria: Many      CAPILLARY BLOOD GLUCOSE          RADIOLOGY & ADDITIONAL TESTS:    Imaging Personally Reviewed:  [ ] YES  [ ] NO    Consultant(s) Notes Reviewed:  [ ] YES  [ ] NO    Care Discussed with Consultants/Other Providers [ ] YES  [ ] NO   Patient is a 72y old  Female who presents with a chief complaint of Lethargic and hypoxic (05 Sep 2022 07:17)      INTERVAL OVER NIGHT EVENTS:    MEDICATIONS  (STANDING):  baclofen 2.5 milliGRAM(s) Oral every 8 hours  cyanocobalamin 1000 MICROGram(s) Oral daily  Duopa Enteral Suspension 1 Bottle,carbidopa 4.63mg/levodopa 20mg per mL 72 mL/Day 72 mL/Day Enteral Tube Continuous Pump  famotidine    Tablet 20 milliGRAM(s) Oral daily  folic acid 1 milliGRAM(s) Oral daily  heparin   Injectable 5000 Unit(s) SubCutaneous every 12 hours  influenza  Vaccine (HIGH DOSE) 0.7 milliLiter(s) IntraMuscular once  meropenem  IVPB      meropenem  IVPB 1000 milliGRAM(s) IV Intermittent every 12 hours  midodrine. 5 milliGRAM(s) Oral three times a day  mirtazapine 45 milliGRAM(s) Oral at bedtime  polyethylene glycol 3350 17 Gram(s) Oral daily  rotigotine patch 4 mG/24 Hr(s) 1 Patch Transdermal every 24 hours  sertraline 100 milliGRAM(s) Oral daily    MEDICATIONS  (PRN):  acetaminophen     Tablet .. 650 milliGRAM(s) Oral every 6 hours PRN Temp greater or equal to 38C (100.4F), Moderate Pain (4 - 6)      Allergies    Ceclor (Rash)  latex (Rash; Urticaria)    Intolerances        REVIEW OF SYSTEMS:  CONSTITUTIONAL: No fever, weight loss, or fatigue  EYES: No eye pain, visual disturbances, or discharge  ENMT:  No difficulty hearing, tinnitus, vertigo; No sinus or throat pain  NECK: No pain or stiffness  BREASTS: No pain, masses, or nipple discharge  RESPIRATORY: No cough, wheezing, chills or hemoptysis; No shortness of breath  CARDIOVASCULAR: No chest pain, palpitations, dizziness, or leg swelling  GASTROINTESTINAL: No abdominal or epigastric pain. No nausea, vomiting, or hematemesis; No diarrhea or constipation. No melena or hematochezia.  GENITOURINARY: No dysuria, frequency, hematuria, or incontinence  NEUROLOGICAL: No headaches, memory loss, loss of strength, numbness, or tremors  SKIN: No itching, burning, rashes, or lesions   LYMPH NODES: No enlarged glands  ENDOCRINE: No heat or cold intolerance; No hair loss  MUSCULOSKELETAL: No joint pain or swelling; No muscle, back, or extremity pain  PSYCHIATRIC: No depression, anxiety, mood swings, or difficulty sleeping  HEME/LYMPH: No easy bruising, or bleeding gums  ALLERGY AND IMMUNOLOGIC: No hives or eczema    Vital Signs Last 24 Hrs  T(C): 37.2 (05 Sep 2022 05:37), Max: 37.2 (05 Sep 2022 05:37)  T(F): 98.9 (05 Sep 2022 05:37), Max: 98.9 (05 Sep 2022 05:37)  HR: 90 (05 Sep 2022 05:37) (90 - 100)  BP: 92/56 (05 Sep 2022 05:37) (91/54 - 98/54)  BP(mean): --  RR: 18 (05 Sep 2022 05:37) (18 - 19)  SpO2: 90% (05 Sep 2022 05:37) (90% - 97%)    Parameters below as of 05 Sep 2022 05:37  Patient On (Oxygen Delivery Method): nasal cannula        PHYSICAL EXAM:  GENERAL: NAD, well-groomed, well-developed  HEAD:  Atraumatic, Normocephalic  EYES: EOMI, PERRLA, conjunctiva and sclera clear  ENMT: No tonsillar erythema, exudates, or enlargement; Moist mucous membranes, Good dentition, No lesions  NECK: Supple, No JVD, Normal thyroid  NERVOUS SYSTEM:  Alert & Oriented X3, Motor Strength 5/5 B/L upper and lower extremities; DTRs 2+ intact and symmetric  CHEST/LUNG: Clear to percussion bilaterally; No rales, rhonchi, wheezing, or rubs  HEART: Regular rate and rhythm; No murmurs, rubs, or gallops  ABDOMEN: Soft, Nontender, Nondistended; Bowel sounds present  EXTREMITIES:  2+ Peripheral Pulses, No clubbing, cyanosis, or edema  LYMPH: No lymphadenopathy noted  SKIN: No rashes or lesions    LABS:                        7.9    10.86 )-----------( 279      ( 05 Sep 2022 06:51 )             24.5     09-05    140  |  105  |  57<H>  ----------------------------<  85  3.3<L>   |  25  |  0.65    Ca    8.8      05 Sep 2022 06:51  Phos  4.1     09-05  Mg     2.3     -    TPro  6.6  /  Alb  2.0<L>  /  TBili  0.6  /  DBili  x   /  AST  28  /  ALT  11<L>  /  AlkPhos  185<H>      PT/INR - ( 04 Sep 2022 01:03 )   PT: 12.8 sec;   INR: 1.09 ratio         PTT - ( 04 Sep 2022 01:03 )  PTT:26.9 sec  Urinalysis Basic - ( 04 Sep 2022 01:00 )    Color: Yellow / Appearance: mucous / S.010 / pH: x  Gluc: x / Ketone: Negative  / Bili: Negative / Urobili: Negative   Blood: x / Protein: 100 / Nitrite: Negative   Leuk Esterase: Moderate / RBC: 3-5 /HPF / WBC 11-25   Sq Epi: x / Non Sq Epi: Occasional / Bacteria: Many      CAPILLARY BLOOD GLUCOSE          RADIOLOGY & ADDITIONAL TESTS:    Imaging Personally Reviewed:  [x] YES  [ ] NO    Consultant(s) Notes Reviewed:  [x] YES  [ ] NO    Care Discussed with Consultants/Other Providers [x] YES  [ ] NO

## 2022-09-06 DIAGNOSIS — R53.83 OTHER FATIGUE: ICD-10-CM

## 2022-09-06 DIAGNOSIS — F03.90 UNSPECIFIED DEMENTIA WITHOUT BEHAVIORAL DISTURBANCE: ICD-10-CM

## 2022-09-06 DIAGNOSIS — R13.10 DYSPHAGIA, UNSPECIFIED: ICD-10-CM

## 2022-09-06 LAB
-  AMIKACIN: SIGNIFICANT CHANGE UP
-  AMOXICILLIN/CLAVULANIC ACID: SIGNIFICANT CHANGE UP
-  AMPICILLIN/SULBACTAM: SIGNIFICANT CHANGE UP
-  AMPICILLIN: SIGNIFICANT CHANGE UP
-  AZTREONAM: SIGNIFICANT CHANGE UP
-  CEFAZOLIN: SIGNIFICANT CHANGE UP
-  CEFEPIME: SIGNIFICANT CHANGE UP
-  CEFTRIAXONE: SIGNIFICANT CHANGE UP
-  CIPROFLOXACIN: SIGNIFICANT CHANGE UP
-  ERTAPENEM: SIGNIFICANT CHANGE UP
-  GENTAMICIN: SIGNIFICANT CHANGE UP
-  LEVOFLOXACIN: SIGNIFICANT CHANGE UP
-  MEROPENEM: SIGNIFICANT CHANGE UP
-  NITROFURANTOIN: SIGNIFICANT CHANGE UP
-  PIPERACILLIN/TAZOBACTAM: SIGNIFICANT CHANGE UP
-  TOBRAMYCIN: SIGNIFICANT CHANGE UP
-  TRIMETHOPRIM/SULFAMETHOXAZOLE: SIGNIFICANT CHANGE UP
ALBUMIN SERPL ELPH-MCNC: 2.2 G/DL — LOW (ref 3.3–5)
ALP SERPL-CCNC: 193 U/L — HIGH (ref 40–120)
ALT FLD-CCNC: 14 U/L — SIGNIFICANT CHANGE UP (ref 12–78)
ANION GAP SERPL CALC-SCNC: 9 MMOL/L — SIGNIFICANT CHANGE UP (ref 5–17)
AST SERPL-CCNC: 40 U/L — HIGH (ref 15–37)
BILIRUB SERPL-MCNC: 0.5 MG/DL — SIGNIFICANT CHANGE UP (ref 0.2–1.2)
BUN SERPL-MCNC: 47 MG/DL — HIGH (ref 7–23)
CALCIUM SERPL-MCNC: 8.8 MG/DL — SIGNIFICANT CHANGE UP (ref 8.5–10.1)
CHLORIDE SERPL-SCNC: 111 MMOL/L — HIGH (ref 96–108)
CO2 SERPL-SCNC: 25 MMOL/L — SIGNIFICANT CHANGE UP (ref 22–31)
CREAT SERPL-MCNC: 0.56 MG/DL — SIGNIFICANT CHANGE UP (ref 0.5–1.3)
CULTURE RESULTS: SIGNIFICANT CHANGE UP
D DIMER BLD IA.RAPID-MCNC: 1000 NG/ML DDU — HIGH
EGFR: 96 ML/MIN/1.73M2 — SIGNIFICANT CHANGE UP
GLUCOSE SERPL-MCNC: 190 MG/DL — HIGH (ref 70–99)
GRAM STN FLD: SIGNIFICANT CHANGE UP
HCT VFR BLD CALC: 25.6 % — LOW (ref 34.5–45)
HGB BLD-MCNC: 8.1 G/DL — LOW (ref 11.5–15.5)
MAGNESIUM SERPL-MCNC: 2.3 MG/DL — SIGNIFICANT CHANGE UP (ref 1.6–2.6)
MCHC RBC-ENTMCNC: 26.3 PG — LOW (ref 27–34)
MCHC RBC-ENTMCNC: 31.6 GM/DL — LOW (ref 32–36)
MCV RBC AUTO: 83.1 FL — SIGNIFICANT CHANGE UP (ref 80–100)
METHOD TYPE: SIGNIFICANT CHANGE UP
NRBC # BLD: 0 /100 WBCS — SIGNIFICANT CHANGE UP (ref 0–0)
ORGANISM # SPEC MICROSCOPIC CNT: SIGNIFICANT CHANGE UP
PLATELET # BLD AUTO: 294 K/UL — SIGNIFICANT CHANGE UP (ref 150–400)
POTASSIUM SERPL-MCNC: 3 MMOL/L — LOW (ref 3.5–5.3)
POTASSIUM SERPL-SCNC: 3 MMOL/L — LOW (ref 3.5–5.3)
PROT SERPL-MCNC: 6.8 G/DL — SIGNIFICANT CHANGE UP (ref 6–8.3)
RBC # BLD: 3.08 M/UL — LOW (ref 3.8–5.2)
RBC # FLD: 17.8 % — HIGH (ref 10.3–14.5)
SARS-COV-2 RNA SPEC QL NAA+PROBE: SIGNIFICANT CHANGE UP
SODIUM SERPL-SCNC: 145 MMOL/L — SIGNIFICANT CHANGE UP (ref 135–145)
SPECIMEN SOURCE: SIGNIFICANT CHANGE UP
WBC # BLD: 14.02 K/UL — HIGH (ref 3.8–10.5)
WBC # FLD AUTO: 14.02 K/UL — HIGH (ref 3.8–10.5)

## 2022-09-06 RX ORDER — ASCORBIC ACID 60 MG
500 TABLET,CHEWABLE ORAL
Refills: 0 | Status: DISCONTINUED | OUTPATIENT
Start: 2022-09-06 | End: 2022-09-16

## 2022-09-06 RX ORDER — ACETAMINOPHEN 500 MG
650 TABLET ORAL ONCE
Refills: 0 | Status: DISCONTINUED | OUTPATIENT
Start: 2022-09-06 | End: 2022-09-06

## 2022-09-06 RX ORDER — ACETAMINOPHEN 500 MG
650 TABLET ORAL ONCE
Refills: 0 | Status: COMPLETED | OUTPATIENT
Start: 2022-09-06 | End: 2022-09-06

## 2022-09-06 RX ORDER — MEROPENEM 1 G/30ML
1000 INJECTION INTRAVENOUS EVERY 8 HOURS
Refills: 0 | Status: DISCONTINUED | OUTPATIENT
Start: 2022-09-06 | End: 2022-09-07

## 2022-09-06 RX ORDER — IBUPROFEN 200 MG
200 TABLET ORAL ONCE
Refills: 0 | Status: COMPLETED | OUTPATIENT
Start: 2022-09-06 | End: 2022-09-06

## 2022-09-06 RX ADMIN — MEROPENEM 100 MILLIGRAM(S): 1 INJECTION INTRAVENOUS at 05:35

## 2022-09-06 RX ADMIN — Medication 650 MILLIGRAM(S): at 05:33

## 2022-09-06 RX ADMIN — MEROPENEM 100 MILLIGRAM(S): 1 INJECTION INTRAVENOUS at 14:33

## 2022-09-06 RX ADMIN — Medication 1 MILLIGRAM(S): at 11:32

## 2022-09-06 RX ADMIN — Medication 2.5 MILLIGRAM(S): at 14:34

## 2022-09-06 RX ADMIN — Medication 650 MILLIGRAM(S): at 02:00

## 2022-09-06 RX ADMIN — MEROPENEM 100 MILLIGRAM(S): 1 INJECTION INTRAVENOUS at 21:32

## 2022-09-06 RX ADMIN — ENOXAPARIN SODIUM 70 MILLIGRAM(S): 100 INJECTION SUBCUTANEOUS at 17:21

## 2022-09-06 RX ADMIN — MIDODRINE HYDROCHLORIDE 5 MILLIGRAM(S): 2.5 TABLET ORAL at 11:32

## 2022-09-06 RX ADMIN — Medication 650 MILLIGRAM(S): at 00:52

## 2022-09-06 RX ADMIN — Medication 650 MILLIGRAM(S): at 14:16

## 2022-09-06 RX ADMIN — MIDODRINE HYDROCHLORIDE 5 MILLIGRAM(S): 2.5 TABLET ORAL at 20:36

## 2022-09-06 RX ADMIN — Medication 650 MILLIGRAM(S): at 21:40

## 2022-09-06 RX ADMIN — Medication 650 MILLIGRAM(S): at 06:33

## 2022-09-06 RX ADMIN — Medication 2.5 MILLIGRAM(S): at 21:33

## 2022-09-06 RX ADMIN — Medication 650 MILLIGRAM(S): at 11:45

## 2022-09-06 RX ADMIN — FAMOTIDINE 20 MILLIGRAM(S): 10 INJECTION INTRAVENOUS at 11:32

## 2022-09-06 RX ADMIN — MIDODRINE HYDROCHLORIDE 5 MILLIGRAM(S): 2.5 TABLET ORAL at 05:34

## 2022-09-06 RX ADMIN — Medication 2.5 MILLIGRAM(S): at 05:33

## 2022-09-06 RX ADMIN — Medication 650 MILLIGRAM(S): at 20:38

## 2022-09-06 RX ADMIN — ROTIGOTINE 1 PATCH: 8 PATCH, EXTENDED RELEASE TRANSDERMAL at 07:00

## 2022-09-06 RX ADMIN — ENOXAPARIN SODIUM 70 MILLIGRAM(S): 100 INJECTION SUBCUTANEOUS at 05:53

## 2022-09-06 RX ADMIN — MIRTAZAPINE 45 MILLIGRAM(S): 45 TABLET, ORALLY DISINTEGRATING ORAL at 21:34

## 2022-09-06 RX ADMIN — Medication 200 MILLIGRAM(S): at 08:50

## 2022-09-06 RX ADMIN — Medication 200 MILLIGRAM(S): at 11:23

## 2022-09-06 RX ADMIN — PREGABALIN 1000 MICROGRAM(S): 225 CAPSULE ORAL at 11:32

## 2022-09-06 RX ADMIN — SERTRALINE 100 MILLIGRAM(S): 25 TABLET, FILM COATED ORAL at 11:32

## 2022-09-06 RX ADMIN — POLYETHYLENE GLYCOL 3350 17 GRAM(S): 17 POWDER, FOR SOLUTION ORAL at 11:32

## 2022-09-06 NOTE — PROGRESS NOTE ADULT - SUBJECTIVE AND OBJECTIVE BOX
Patient is a 72y old  Female who presents with a chief complaint of Lethargic and hypoxic (06 Sep 2022 05:52)      INTERVAL OVER NIGHT EVENTS:    MEDICATIONS  (STANDING):  baclofen 2.5 milliGRAM(s) Oral every 8 hours  cyanocobalamin 1000 MICROGram(s) Oral daily  Duopa Enteral Suspension 1 Bottle,carbidopa 4.63mg/levodopa 20mg per mL 72 mL/Day 72 mL/Day Enteral Tube Continuous Pump  enoxaparin Injectable 70 milliGRAM(s) SubCutaneous every 12 hours  famotidine    Tablet 20 milliGRAM(s) Oral daily  folic acid 1 milliGRAM(s) Oral daily  influenza  Vaccine (HIGH DOSE) 0.7 milliLiter(s) IntraMuscular once  meropenem  IVPB      meropenem  IVPB 1000 milliGRAM(s) IV Intermittent every 12 hours  midodrine. 5 milliGRAM(s) Oral three times a day  mirtazapine 45 milliGRAM(s) Oral at bedtime  polyethylene glycol 3350 17 Gram(s) Oral daily  rotigotine patch 4 mG/24 Hr(s) 1 Patch Transdermal every 24 hours  sertraline 100 milliGRAM(s) Oral daily    MEDICATIONS  (PRN):  acetaminophen     Tablet .. 650 milliGRAM(s) Oral every 6 hours PRN Temp greater or equal to 38C (100.4F), Moderate Pain (4 - 6)      Allergies    Ceclor (Rash)  IV Contrast (Hypotension)  latex (Rash; Urticaria)    Intolerances        REVIEW OF SYSTEMS:  CONSTITUTIONAL: No fever, weight loss, or fatigue  EYES: No eye pain, visual disturbances, or discharge  ENMT:  No difficulty hearing, tinnitus, vertigo; No sinus or throat pain  NECK: No pain or stiffness  BREASTS: No pain, masses, or nipple discharge  RESPIRATORY: No cough, wheezing, chills or hemoptysis; No shortness of breath  CARDIOVASCULAR: No chest pain, palpitations, dizziness, or leg swelling  GASTROINTESTINAL: No abdominal or epigastric pain. No nausea, vomiting, or hematemesis; No diarrhea or constipation. No melena or hematochezia.  GENITOURINARY: No dysuria, frequency, hematuria, or incontinence  NEUROLOGICAL: No headaches, memory loss, loss of strength, numbness, or tremors  SKIN: No itching, burning, rashes, or lesions   LYMPH NODES: No enlarged glands  ENDOCRINE: No heat or cold intolerance; No hair loss  MUSCULOSKELETAL: No joint pain or swelling; No muscle, back, or extremity pain  PSYCHIATRIC: No depression, anxiety, mood swings, or difficulty sleeping  HEME/LYMPH: No easy bruising, or bleeding gums  ALLERGY AND IMMUNOLOGIC: No hives or eczema    Vital Signs Last 24 Hrs  T(C): 38.6 (06 Sep 2022 05:18), Max: 38.6 (06 Sep 2022 05:18)  T(F): 101.5 (06 Sep 2022 05:18), Max: 101.5 (06 Sep 2022 05:18)  HR: 84 (06 Sep 2022 05:18) (79 - 85)  BP: 108/64 (06 Sep 2022 05:18) (94/56 - 108/64)  BP(mean): --  RR: 18 (06 Sep 2022 05:18) (18 - 20)  SpO2: 94% (06 Sep 2022 05:18) (94% - 95%)    Parameters below as of 06 Sep 2022 05:18  Patient On (Oxygen Delivery Method): nasal cannula        PHYSICAL EXAM:  GENERAL: NAD, well-groomed, well-developed  HEAD:  Atraumatic, Normocephalic  EYES: EOMI, PERRLA, conjunctiva and sclera clear  ENMT: No tonsillar erythema, exudates, or enlargement; Moist mucous membranes, Good dentition, No lesions  NECK: Supple, No JVD, Normal thyroid  NERVOUS SYSTEM:  Alert & Oriented X3, Motor Strength 5/5 B/L upper and lower extremities; DTRs 2+ intact and symmetric  CHEST/LUNG: Clear to percussion bilaterally; No rales, rhonchi, wheezing, or rubs  HEART: Regular rate and rhythm; No murmurs, rubs, or gallops  ABDOMEN: Soft, Nontender, Nondistended; Bowel sounds present  EXTREMITIES:  2+ Peripheral Pulses, No clubbing, cyanosis, or edema  LYMPH: No lymphadenopathy noted  SKIN: No rashes or lesions    LABS:                        8.1    14.02 )-----------( 294      ( 06 Sep 2022 00:07 )             25.6     09-05    140  |  105  |  57<H>  ----------------------------<  85  3.3<L>   |  25  |  0.65    Ca    8.8      05 Sep 2022 06:51  Phos  4.1     09-05  Mg     2.3     09-05    TPro  6.6  /  Alb  2.0<L>  /  TBili  0.6  /  DBili  x   /  AST  28  /  ALT  11<L>  /  AlkPhos  185<H>  09-05        CAPILLARY BLOOD GLUCOSE          RADIOLOGY & ADDITIONAL TESTS:    Imaging Personally Reviewed:  [ ] YES  [ ] NO    Consultant(s) Notes Reviewed:  [ ] YES  [ ] NO    Care Discussed with Consultants/Other Providers [ ] YES  [ ] NO   Patient is a 72y old  Female who presents with a chief complaint of Lethargic and hypoxic (06 Sep 2022 05:52)      INTERVAL OVER NIGHT EVENTS:    MEDICATIONS  (STANDING):  baclofen 2.5 milliGRAM(s) Oral every 8 hours  cyanocobalamin 1000 MICROGram(s) Oral daily  Duopa Enteral Suspension 1 Bottle,carbidopa 4.63mg/levodopa 20mg per mL 72 mL/Day 72 mL/Day Enteral Tube Continuous Pump  enoxaparin Injectable 70 milliGRAM(s) SubCutaneous every 12 hours  famotidine    Tablet 20 milliGRAM(s) Oral daily  folic acid 1 milliGRAM(s) Oral daily  influenza  Vaccine (HIGH DOSE) 0.7 milliLiter(s) IntraMuscular once  meropenem  IVPB      meropenem  IVPB 1000 milliGRAM(s) IV Intermittent every 12 hours  midodrine. 5 milliGRAM(s) Oral three times a day  mirtazapine 45 milliGRAM(s) Oral at bedtime  polyethylene glycol 3350 17 Gram(s) Oral daily  rotigotine patch 4 mG/24 Hr(s) 1 Patch Transdermal every 24 hours  sertraline 100 milliGRAM(s) Oral daily    MEDICATIONS  (PRN):  acetaminophen     Tablet .. 650 milliGRAM(s) Oral every 6 hours PRN Temp greater or equal to 38C (100.4F), Moderate Pain (4 - 6)      Allergies    Ceclor (Rash)  IV Contrast (Hypotension)  latex (Rash; Urticaria)    Intolerances        REVIEW OF SYSTEMS:  Non verbal has fever.    Vital Signs Last 24 Hrs  T(C): 38.6 (06 Sep 2022 05:18), Max: 38.6 (06 Sep 2022 05:18)  T(F): 101.5 (06 Sep 2022 05:18), Max: 101.5 (06 Sep 2022 05:18)  HR: 84 (06 Sep 2022 05:18) (79 - 85)  BP: 108/64 (06 Sep 2022 05:18) (94/56 - 108/64)  BP(mean): --  RR: 18 (06 Sep 2022 05:18) (18 - 20)  SpO2: 94% (06 Sep 2022 05:18) (94% - 95%)    Parameters below as of 06 Sep 2022 05:18  Patient On (Oxygen Delivery Method): nasal cannula        PHYSICAL EXAM:  GENERAL: Non verbal  HEAD:  Atraumatic, Normocephalic  EYES: PERRLA, conjunctiva and sclera clear  ENMT:  Moist mucous membranes, Good dentition, No lesions  NECK: Supple, No JVD, Normal thyroid  NERVOUS SYSTEM:   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  CHEST/LUNG: Clear to percussion bilaterally; No rales, rhonchi, wheezing, or rubs  HEART: Regular rate and rhythm; No murmurs, rubs, or gallops  ABDOMEN: Soft, Nondistended; Bowel sounds present.  GT in place  EXTREMITIES:  2+ Peripheral Pulses, No clubbing, cyanosis, or edema  LYMPH: No lymphadenopathy noted  SKIN: No rashes or lesions    LABS:                        8.1    14.02 )-----------( 294      ( 06 Sep 2022 00:07 )             25.6     09-05    140  |  105  |  57<H>  ----------------------------<  85  3.3<L>   |  25  |  0.65    Ca    8.8      05 Sep 2022 06:51  Phos  4.1     09-05  Mg     2.3     09-05    TPro  6.6  /  Alb  2.0<L>  /  TBili  0.6  /  DBili  x   /  AST  28  /  ALT  11<L>  /  AlkPhos  185<H>  09-05        CAPILLARY BLOOD GLUCOSE          RADIOLOGY & ADDITIONAL TESTS:    Imaging Personally Reviewed:  [x] YES  [ ] NO    Consultant(s) Notes Reviewed:  [x] YES  [ ] NO    Care Discussed with Consultants/Other Providers [x] YES  [ ] NO

## 2022-09-06 NOTE — DIETITIAN INITIAL EVALUATION ADULT - NS FNS DIET ORDER
Diet, NPO:   Tube Feeding Modality: Gastrostomy  Jevity 1.5  Total Volume for 24 Hours (mL): 1440  Continuous  Starting Tube Feed Rate {mL per Hour}: 40  Increase Tube Feed Rate by (mL): 10     Every hour  Until Goal Tube Feed Rate (mL per Hour): 60  Tube Feed Duration (in Hours): 24  Tube Feed Start Time: 23:15 (09-05-22 @ 23:07)

## 2022-09-06 NOTE — DIETITIAN INITIAL EVALUATION ADULT - ETIOLOGY
related to increased nutrient needs for wound healing  related to motor issues/advanced parkinson's disease

## 2022-09-06 NOTE — DIETITIAN INITIAL EVALUATION ADULT - NSICDXPASTSURGICALHX_GEN_ALL_CORE_FT
PAST SURGICAL HISTORY:  Abdominal hernia with mesh, 2003    Basal cell carcinoma removal, mid vertex scalp, 2002    H/O colonoscopy , 2008 polypectomy, ,     H/O ovarian cystectomy right,     History of  10/1982    S/P deep brain stimulator placement 2006, 2 brain pacemakers Model #30710, batteries replaced 2014

## 2022-09-06 NOTE — PHARMACOTHERAPY INTERVENTION NOTE - COMMENTS
Patient brought in from [facility] on Duopa (carbidopa 4.63mg/levodopa 20mg) Enteral Suspension for history of Parkinson's Disease. Attempted to clarify dosing with Dr. Rebekah Ventura at [facility], however MD was unable to provide exact dosing and deferred to . Discussed home medications with patient's  Adriel Araiza ([phone]) at bedside, who has been taking care of patient's pump/medications at home prior to her recent admission to [facility].  confirmed that the patient has been Duopa Enteral Suspension since October 2016 and receives 3mL/hour continuously throughout the day.      As per , medication was prescribed by neurologist Dr. Tamiko Chong and is dispensed by specialty pharmacy [facility] [employer] [facility] [employer] [facility]. Unable to reach physician's office and pharmacy at this time as they are closed for the holiday weekend - will follow up on Tuesday. The medication comes in a cartridge which needs to be refrigerated prior to use and is changed every 12 hours (10 am and 10pm) due to stability of medication (discard after 16 hours as per ).  After discussing with nursing and admitting physician Dr. Afsaneh Long MD would like to continue the patient's home medication. MD and nursing okay with  switching out the cartridge q12h. Discussed with  at bedside, he is aware to let RN know when the cartridge is being switched out.      Patient is also on Neupro 4mg/24 hour patch which MD would like to continue as well and  will be bringing in from home along with the Duopa cartridges.  is okay with medications being stored by pharmacy while patient is in the hospital and will bring in the medications tonight around 10 pm. Pharmacy staff, MD, and nursing aware.      Orders have been entered into [facility] to reflect discussion above - including instructions for patient/caregiver to administer medication  
Patient is a 72y F presenting with ESBL Proteus mirabilis bacteremia. Based on the patient's improving renal function (CrCl = 66 mL/min), increased dose of meropenem to 1000 mg IV Q8 per pharmacy policy.    Christina De La Cruz, PharmD  Clinical Pharmacy Specialist [phone]
Modified penicillin allergy to state patient tolerated piperacillin-tazobactam during this admission. 
Recommended escalating from piperacillin-tazobactam to meropenem 1g IV q12h (CrCl of ~40mL/min) in the setting of ESBL Proteus spp. bacteremia.

## 2022-09-06 NOTE — DIETITIAN INITIAL EVALUATION ADULT - ENTERAL
Rx Jevity @ 70cc/hr x 16 hrs + prosource 30cc TID to provide pt with 1860 citlalli and 115 gms protein.

## 2022-09-06 NOTE — DIETITIAN INITIAL EVALUATION ADULT - PERTINENT LABORATORY DATA
09-06    145  |  111<H>  |  47<H>  ----------------------------<  190<H>  3.0<L>   |  25  |  0.56    Ca    8.8      06 Sep 2022 06:58  Phos  4.1     09-05  Mg     2.3     09-06    TPro  6.8  /  Alb  2.2<L>  /  TBili  0.5  /  DBili  x   /  AST  40<H>  /  ALT  14  /  AlkPhos  193<H>  09-06

## 2022-09-06 NOTE — DIETITIAN INITIAL EVALUATION ADULT - OTHER INFO
73 y/o female adm with fever from [facility]. PMH acute respiratory failure with hypoxia, depression, GERD, parkinson's, basal cell carcinoma, constipation. Pt non verbal, lying in bed at time of visit this am. Pt with both PEG and J tube. Meds and TF given via PEG. J tube is used for some meds at times. Pt currently tolerating PEG feeds of Jevity 1.5 @ 60 cc/hr x 24 hrs.

## 2022-09-06 NOTE — PROGRESS NOTE ADULT - ASSESSMENT
[facility] Infectious Diseases  Pt well know to our group from care at [facility]     72[facility].   Patient is a terminal case of PD.   At ED arrival her O2 sat was 72%.  Patient DNR/DNI started on 100% NRBM and her saturation improved.   Patient is non communicative most of informations received from [facility] facility and from spouse.  Patient has PEG/JT and on feeding and Parkinson's Disease medications.  Blood culture (collected 9/4 1am)    -  ESBL: Detec    -  Proteus species: Detec    -  CTX-M Resistance Marker: Detec    RECOMMENDATIONS  PT with acute respiratory distress and on imaging suggestion of LLL PNA. And now noted to have ESBL Proteus bacteremia  recommend meropenem first day 9/5   -will order rpt blood cultures to document clearance and inform duration recommendations  -ESBL proteus in urine so potential source as acute pyelonephritis with bacteremia    All interventions in keeping with goals of care     Thank you for consulting us and involving us in the management of this most interesting and challenging case.  We will follow along in the care of this patient. Please call us at [phone] or text me directly on my cell# at [phone] with any concerns.

## 2022-09-06 NOTE — PROGRESS NOTE ADULT - SUBJECTIVE AND OBJECTIVE BOX
Date/Time Patient Seen:  		  Referring MD:   Data Reviewed	       Patient is a 72y old  Female who presents with a chief complaint of Lethargic and hypoxic (05 Sep 2022 10:10)      Subjective/HPI     PAST MEDICAL & SURGICAL HISTORY:  Constipation    Unilateral primary osteoarthritis, left hip    Parkinson&#x27;s disease    Basal cell carcinoma  scalp    GERD (gastroesophageal reflux disease)    Seasonal allergies    Depression    Acute respiratory failure with hypoxia    S/P deep brain stimulator placement  2006, 2 brain pacemakers Model #41432, batteries replaced 2014    History of   10/1982    H/O ovarian cystectomy  right,     H/O colonoscopy  , 2008 polypectomy, ,     Basal cell carcinoma  removal, mid vertex scalp, 2002    Abdominal hernia  with mesh, 2003          Medication list         MEDICATIONS  (STANDING):  baclofen 2.5 milliGRAM(s) Oral every 8 hours  cyanocobalamin 1000 MICROGram(s) Oral daily  Duopa Enteral Suspension 1 Bottle,carbidopa 4.63mg/levodopa 20mg per mL 72 mL/Day 72 mL/Day Enteral Tube Continuous Pump  enoxaparin Injectable 70 milliGRAM(s) SubCutaneous every 12 hours  famotidine    Tablet 20 milliGRAM(s) Oral daily  folic acid 1 milliGRAM(s) Oral daily  influenza  Vaccine (HIGH DOSE) 0.7 milliLiter(s) IntraMuscular once  meropenem  IVPB      meropenem  IVPB 1000 milliGRAM(s) IV Intermittent every 12 hours  midodrine. 5 milliGRAM(s) Oral three times a day  mirtazapine 45 milliGRAM(s) Oral at bedtime  polyethylene glycol 3350 17 Gram(s) Oral daily  rotigotine patch 4 mG/24 Hr(s) 1 Patch Transdermal every 24 hours  sertraline 100 milliGRAM(s) Oral daily    MEDICATIONS  (PRN):  acetaminophen     Tablet .. 650 milliGRAM(s) Oral every 6 hours PRN Temp greater or equal to 38C (100.4F), Moderate Pain (4 - 6)         Vitals log        ICU Vital Signs Last 24 Hrs  T(C): 38.6 (06 Sep 2022 05:18), Max: 38.6 (06 Sep 2022 05:18)  T(F): 101.5 (06 Sep 2022 05:18), Max: 101.5 (06 Sep 2022 05:18)  HR: 84 (06 Sep 2022 05:18) (79 - 85)  BP: 108/64 (06 Sep 2022 05:18) (94/56 - 108/64)  BP(mean): --  ABP: --  ABP(mean): --  RR: 18 (06 Sep 2022 05:18) (18 - 20)  SpO2: 94% (06 Sep 2022 05:18) (94% - 95%)    O2 Parameters below as of 06 Sep 2022 05:18  Patient On (Oxygen Delivery Method): nasal cannula                 Input and Output:  I&O's Detail    04 Sep 2022 07:01  -  05 Sep 2022 07:00  --------------------------------------------------------  IN:  Total IN: 0 mL    OUT:    Indwelling Catheter - Urethral (mL): 500 mL  Total OUT: 500 mL    Total NET: -500 mL      05 Sep 2022 07:01  -  06 Sep 2022 05:52  --------------------------------------------------------  IN:  Total IN: 0 mL    OUT:    Indwelling Catheter - Urethral (mL): 750 mL  Total OUT: 750 mL    Total NET: -750 mL          Lab Data                        8.1    14.02 )-----------( 294      ( 06 Sep 2022 00:07 )             25.6     09-    140  |  105  |  57<H>  ----------------------------<  85  3.3<L>   |  25  |  0.65    Ca    8.8      05 Sep 2022 06:51  Phos  4.1       Mg     2.3         TPro  6.6  /  Alb  2.0<L>  /  TBili  0.6  /  DBili  x   /  AST  28  /  ALT  11<L>  /  AlkPhos  185<H>  09-05            Review of Systems	      Objective     Physical Examination  heart s1s2  lung dc BS  abd dec BS        Pertinent Lab findings & Imaging      Yoel:  NO   Adequate UO     I&O's Detail    04 Sep 2022 07:01  -  05 Sep 2022 07:00  --------------------------------------------------------  IN:  Total IN: 0 mL    OUT:    Indwelling Catheter - Urethral (mL): 500 mL  Total OUT: 500 mL    Total NET: -500 mL      05 Sep 2022 07:01  -  06 Sep 2022 05:52  --------------------------------------------------------  IN:  Total IN: 0 mL    OUT:    Indwelling Catheter - Urethral (mL): 750 mL  Total OUT: 750 mL    Total NET: -750 mL               Discussed with:     Cultures:	        Radiology

## 2022-09-06 NOTE — PROGRESS NOTE ADULT - SUBJECTIVE AND OBJECTIVE BOX
[facility] DIVISION of INFECTIOUS DISEASE  Cash Saini MD PhD, Mya Romero MD, Deann Zamarripa MD, Baldev Haynes MD, Chong oJhns MD  and providing coverage with Lucinda Reynoso MD  Providing Infectious Disease Consultations at [facility], [facility], [facility]'s    Office# [phone] to schedule follow up appointments  Answering Service for urgent calls or New Consults [phone]  Cell# to text for urgent issues Cash Saini [phone]     infectious diseases progress note:    MATEO LESLIE is a 72y y. o. Female patient    Overnight and events of the last 24hrs reviewed    Allergies    Ceclor (Rash)  IV Contrast (Hypotension)  latex (Rash; Urticaria)    Intolerances        ANTIBIOTICS/RELEVANT:  antimicrobials  meropenem  IVPB      meropenem  IVPB 1000 milliGRAM(s) IV Intermittent every 12 hours    immunologic:  influenza  Vaccine (HIGH DOSE) 0.7 milliLiter(s) IntraMuscular once    OTHER:  acetaminophen     Tablet .. 650 milliGRAM(s) Oral every 6 hours PRN  baclofen 2.5 milliGRAM(s) Oral every 8 hours  cyanocobalamin 1000 MICROGram(s) Oral daily  Duopa Enteral Suspension 1 Bottle,carbidopa 4.63mg/levodopa 20mg per mL 72 mL/Day 72 mL/Day Enteral Tube Continuous Pump  enoxaparin Injectable 70 milliGRAM(s) SubCutaneous every 12 hours  famotidine    Tablet 20 milliGRAM(s) Oral daily  folic acid 1 milliGRAM(s) Oral daily  midodrine. 5 milliGRAM(s) Oral three times a day  mirtazapine 45 milliGRAM(s) Oral at bedtime  polyethylene glycol 3350 17 Gram(s) Oral daily  rotigotine patch 4 mG/24 Hr(s) 1 Patch Transdermal every 24 hours  sertraline 100 milliGRAM(s) Oral daily      Objective:  Vital Signs Last 24 Hrs  T(C): 38.7 (06 Sep 2022 06:33), Max: 38.7 (06 Sep 2022 06:33)  T(F): 101.7 (06 Sep 2022 06:33), Max: 101.7 (06 Sep 2022 06:33)  HR: 84 (06 Sep 2022 05:18) (79 - 85)  BP: 108/64 (06 Sep 2022 05:18) (94/56 - 108/64)  BP(mean): --  RR: 18 (06 Sep 2022 05:18) (18 - 20)  SpO2: 94% (06 Sep 2022 05:18) (94% - 95%)    Parameters below as of 06 Sep 2022 05:18  Patient On (Oxygen Delivery Method): nasal cannula        T(C): 38.7 (09-06-22 @ 06:33), Max: 38.7 (09-06-22 @ 06:33)  T(C): 38.7 (09-06-22 @ 06:33), Max: 38.7 (09-06-22 @ 06:33)  T(C): 38.7 (09-06-22 @ 06:33), Max: 38.7 (09-06-22 @ 06:33)    PHYSICAL EXAM:  HEENT: NC atraumatic  Neck: supple  Respiratory: no accessory muscle use, breathing comfortably  Cardiovascular: distant  Gastrointestinal: normal appearing, nondistended  Extremities: no clubbing, no cyanosis,        LABS:                          8.1    14.02 )-----------( 294      ( 06 Sep 2022 00:07 )             25.6       WBC  14.02 09-06 @ 00:07  10.86 09-05 @ 06:51  10.42 09-04 @ 06:24  10.65 09-04 @ 01:03      09-06    145  |  111<H>  |  47<H>  ----------------------------<  190<H>  3.0<L>   |  25  |  0.56    Ca    8.8      06 Sep 2022 06:58  Phos  4.1     09-05  Mg     2.3     09-06    TPro  6.8  /  Alb  2.2<L>  /  TBili  0.5  /  DBili  x   /  AST  40<H>  /  ALT  14  /  AlkPhos  193<H>  09-06      Creatinine, Serum: 0.56 mg/dL (09-06-22 @ 06:58)  Creatinine, Serum: 0.65 mg/dL (09-05-22 @ 06:51)  Creatinine, Serum: 1.10 mg/dL (09-04-22 @ 06:24)  Creatinine, Serum: 1.50 mg/dL (09-04-22 @ 01:03)    INFLAMMATORY MARKERS  Auto Neutrophil #: 8.77 K/uL (09-05-22 @ 06:51)  Auto Lymphocyte #: 1.21 K/uL (09-05-22 @ 06:51)  Auto Neutrophil #: 8.59 K/uL (09-04-22 @ 06:24)  Auto Lymphocyte #: 1.16 K/uL (09-04-22 @ 06:24)  Auto Lymphocyte #: 1.49 K/uL (09-04-22 @ 01:03)  Auto Neutrophil #: 8.63 K/uL (09-04-22 @ 01:03)    Lactate, Blood: 1.9 mmol/L (09-04-22 @ 06:24)  Lactate, Blood: 2.7 mmol/L (09-04-22 @ 02:51)  Lactate, Blood: 2.8 mmol/L (09-04-22 @ 01:03)    Auto Eosinophil #: 0.13 K/uL (09-05-22 @ 06:51)  Auto Eosinophil #: 0.04 K/uL (09-04-22 @ 06:24)  Auto Eosinophil #: 0.00 K/uL (09-04-22 @ 01:03)    Ferritin, Serum: 419 ng/mL (09-04-22 @ 02:51)    Activated Partial Thromboplastin Time: 26.9 sec (09-04-22 @ 01:03)  INR: 1.09 ratio (09-04-22 @ 01:03)    D-Dimer Assay, Quantitative: 1000 ng/mL DDU (09-06-22 @ 06:58)  D-Dimer Assay, Quantitative: 1731 ng/mL DDU (09-04-22 @ 08:42)    MICROBIOLOGY:    Culture - Blood (09.04.22 @ 01:16)    -  ESBL: Detec    -  Proteus species: Detec    -  CTX-M Resistance Marker: Detec    Gram Stain:   Growth in aerobic and anaerobic bottles: Gram Negative Rods    Specimen Source: .Blood Blood-Peripheral    Organism: Blood Culture PCR    Culture Results:   Growth in aerobic bottle: Proteus mirabilis  ***Blood Panel PCR results on this specimen are available  approximately 3 hours after the Gram stain result.***  Gram stain, PCR, and/or culture results may not always  correspond due to difference in methodologies.  ************************************************************  This PCR assay was performed by multiplex PCR. This  Assay tests for 66 bacterial and resistance gene targets.  Please refer to the [facility] test directory  at [email or web address] for details.    Organism Identification: Blood Culture PCR    Method Type: PCR        RADIOLOGY & ADDITIONAL STUDIES:

## 2022-09-06 NOTE — PROGRESS NOTE ADULT - ASSESSMENT
72[facility].    PD  Hypoxemia  Effusion  elev D dimer  Frail  Weak  Elderly  OP  OA    VQ scan pending  on Lovenox full dose  LE doppler NEG  febrile overnight  ABX adjustment noted - On NILSON now  VS noted      ID eval in progress  Blood cx noted  labs and imaging reviewed  elev D dimer - contrast allergy cannot have CTA  HOB elev  asp prec  oral hygiene  pt has a PEG  pt has end stage Parkinson's disease - assist with needs - ADL - Fall prec - GOC - Pt is DNR  monitor VS and Sat  o2 support as needed - keep sat > 88 pct  prognosis guarded

## 2022-09-06 NOTE — DIETITIAN INITIAL EVALUATION ADULT - PERTINENT MEDS FT
MEDICATIONS  (STANDING):  baclofen 2.5 milliGRAM(s) Oral every 8 hours  cyanocobalamin 1000 MICROGram(s) Oral daily  Duopa Enteral Suspension 1 Bottle,carbidopa 4.63mg/levodopa 20mg per mL 72 mL/Day 72 mL/Day Enteral Tube Continuous Pump  enoxaparin Injectable 70 milliGRAM(s) SubCutaneous every 12 hours  famotidine    Tablet 20 milliGRAM(s) Oral daily  folic acid 1 milliGRAM(s) Oral daily  influenza  Vaccine (HIGH DOSE) 0.7 milliLiter(s) IntraMuscular once  meropenem  IVPB      meropenem  IVPB 1000 milliGRAM(s) IV Intermittent every 12 hours  midodrine. 5 milliGRAM(s) Oral three times a day  mirtazapine 45 milliGRAM(s) Oral at bedtime  polyethylene glycol 3350 17 Gram(s) Oral daily  rotigotine patch 4 mG/24 Hr(s) 1 Patch Transdermal every 24 hours  sertraline 100 milliGRAM(s) Oral daily    MEDICATIONS  (PRN):  acetaminophen     Tablet .. 650 milliGRAM(s) Oral every 6 hours PRN Temp greater or equal to 38C (100.4F), Moderate Pain (4 - 6)

## 2022-09-06 NOTE — PROVIDER CONTACT NOTE (CRITICAL VALUE NOTIFICATION) - SITUATION
Pt admitted with AMS, had + blood cultures in gram + before
lactate elevated, UTI
blood culture Preliminary positive growth in aerobic bottle proteus mirabilis and positive growth in aerobic bottle  for staph hominis coag negative staph

## 2022-09-06 NOTE — DIETITIAN INITIAL EVALUATION ADULT - NSFNSGIIOFT_GEN_A_CORE
09-05-22 @ 07:01  -  09-06-22 @ 07:00  --------------------------------------------------------  OUT:  Total OUT: 0 mL    Total NET: 270 mL

## 2022-09-06 NOTE — DIETITIAN INITIAL EVALUATION ADULT - NSFNSPHYEXAMSKINFT_GEN_A_CORE
Pressure Injury 1: Left:,heel, Suspected deep tissue injury  Pressure Injury 2: Right:,heel, Suspected deep tissue injury  Pressure Injury 3: sacrum, Suspected deep tissue injury  Pressure Injury 4: none, none  Pressure Injury 5: Left:,metatarsals, Suspected deep tissue injury  Pressure Injury 6: none, none  Pressure Injury 7: none, none  Pressure Injury 8: none, none  Pressure Injury 9: none, none  Pressure Injury 10: none, none  Pressure Injury 11: none, none

## 2022-09-07 LAB
ANION GAP SERPL CALC-SCNC: 5 MMOL/L — SIGNIFICANT CHANGE UP (ref 5–17)
BASOPHILS # BLD AUTO: 0.07 K/UL — SIGNIFICANT CHANGE UP (ref 0–0.2)
BASOPHILS NFR BLD AUTO: 0.4 % — SIGNIFICANT CHANGE UP (ref 0–2)
BUN SERPL-MCNC: 31 MG/DL — HIGH (ref 7–23)
CALCIUM SERPL-MCNC: 8.4 MG/DL — LOW (ref 8.5–10.1)
CHLORIDE SERPL-SCNC: 117 MMOL/L — HIGH (ref 96–108)
CO2 SERPL-SCNC: 30 MMOL/L — SIGNIFICANT CHANGE UP (ref 22–31)
CREAT SERPL-MCNC: 0.5 MG/DL — SIGNIFICANT CHANGE UP (ref 0.5–1.3)
CULTURE RESULTS: SIGNIFICANT CHANGE UP
EGFR: 98 ML/MIN/1.73M2 — SIGNIFICANT CHANGE UP
EOSINOPHIL # BLD AUTO: 0.04 K/UL — SIGNIFICANT CHANGE UP (ref 0–0.5)
EOSINOPHIL NFR BLD AUTO: 0.2 % — SIGNIFICANT CHANGE UP (ref 0–6)
GLUCOSE SERPL-MCNC: 180 MG/DL — HIGH (ref 70–99)
HCT VFR BLD CALC: 28.2 % — LOW (ref 34.5–45)
HGB BLD-MCNC: 8.7 G/DL — LOW (ref 11.5–15.5)
IMM GRANULOCYTES NFR BLD AUTO: 6.7 % — HIGH (ref 0–1.5)
LYMPHOCYTES # BLD AUTO: 13.1 % — SIGNIFICANT CHANGE UP (ref 13–44)
LYMPHOCYTES # BLD AUTO: 2.29 K/UL — SIGNIFICANT CHANGE UP (ref 1–3.3)
MAGNESIUM SERPL-MCNC: 2 MG/DL — SIGNIFICANT CHANGE UP (ref 1.6–2.6)
MCHC RBC-ENTMCNC: 26.5 PG — LOW (ref 27–34)
MCHC RBC-ENTMCNC: 30.9 GM/DL — LOW (ref 32–36)
MCV RBC AUTO: 86 FL — SIGNIFICANT CHANGE UP (ref 80–100)
MONOCYTES # BLD AUTO: 1.37 K/UL — HIGH (ref 0–0.9)
MONOCYTES NFR BLD AUTO: 7.9 % — SIGNIFICANT CHANGE UP (ref 2–14)
NEUTROPHILS # BLD AUTO: 12.51 K/UL — HIGH (ref 1.8–7.4)
NEUTROPHILS NFR BLD AUTO: 71.7 % — SIGNIFICANT CHANGE UP (ref 43–77)
NRBC # BLD: 0 /100 WBCS — SIGNIFICANT CHANGE UP (ref 0–0)
PLATELET # BLD AUTO: 443 K/UL — HIGH (ref 150–400)
POTASSIUM SERPL-MCNC: 3.9 MMOL/L — SIGNIFICANT CHANGE UP (ref 3.5–5.3)
POTASSIUM SERPL-SCNC: 3.9 MMOL/L — SIGNIFICANT CHANGE UP (ref 3.5–5.3)
RBC # BLD: 3.28 M/UL — LOW (ref 3.8–5.2)
RBC # FLD: 18.6 % — HIGH (ref 10.3–14.5)
SODIUM SERPL-SCNC: 152 MMOL/L — HIGH (ref 135–145)
WBC # BLD: 17.45 K/UL — HIGH (ref 3.8–10.5)
WBC # FLD AUTO: 17.45 K/UL — HIGH (ref 3.8–10.5)

## 2022-09-07 PROCEDURE — 99232 SBSQ HOSP IP/OBS MODERATE 35: CPT

## 2022-09-07 RX ORDER — POTASSIUM CHLORIDE 20 MEQ
40 PACKET (EA) ORAL EVERY 4 HOURS
Refills: 0 | Status: COMPLETED | OUTPATIENT
Start: 2022-09-07 | End: 2022-09-07

## 2022-09-07 RX ORDER — POTASSIUM CHLORIDE 20 MEQ
10 PACKET (EA) ORAL ONCE
Refills: 0 | Status: COMPLETED | OUTPATIENT
Start: 2022-09-07 | End: 2022-09-07

## 2022-09-07 RX ORDER — ERTAPENEM SODIUM 1 G/1
1000 INJECTION, POWDER, LYOPHILIZED, FOR SOLUTION INTRAMUSCULAR; INTRAVENOUS EVERY 24 HOURS
Refills: 0 | Status: DISCONTINUED | OUTPATIENT
Start: 2022-09-07 | End: 2022-09-14

## 2022-09-07 RX ADMIN — FAMOTIDINE 20 MILLIGRAM(S): 10 INJECTION INTRAVENOUS at 11:49

## 2022-09-07 RX ADMIN — ROTIGOTINE 1 PATCH: 8 PATCH, EXTENDED RELEASE TRANSDERMAL at 21:23

## 2022-09-07 RX ADMIN — Medication 2.5 MILLIGRAM(S): at 22:11

## 2022-09-07 RX ADMIN — Medication 2.5 MILLIGRAM(S): at 13:08

## 2022-09-07 RX ADMIN — Medication 650 MILLIGRAM(S): at 05:14

## 2022-09-07 RX ADMIN — MEROPENEM 100 MILLIGRAM(S): 1 INJECTION INTRAVENOUS at 05:28

## 2022-09-07 RX ADMIN — MIDODRINE HYDROCHLORIDE 5 MILLIGRAM(S): 2.5 TABLET ORAL at 05:16

## 2022-09-07 RX ADMIN — SERTRALINE 100 MILLIGRAM(S): 25 TABLET, FILM COATED ORAL at 12:05

## 2022-09-07 RX ADMIN — Medication 650 MILLIGRAM(S): at 22:11

## 2022-09-07 RX ADMIN — ENOXAPARIN SODIUM 70 MILLIGRAM(S): 100 INJECTION SUBCUTANEOUS at 05:15

## 2022-09-07 RX ADMIN — Medication 100 MILLIEQUIVALENT(S): at 01:33

## 2022-09-07 RX ADMIN — MIRTAZAPINE 45 MILLIGRAM(S): 45 TABLET, ORALLY DISINTEGRATING ORAL at 22:11

## 2022-09-07 RX ADMIN — Medication 1 MILLIGRAM(S): at 11:49

## 2022-09-07 RX ADMIN — Medication 650 MILLIGRAM(S): at 06:30

## 2022-09-07 RX ADMIN — Medication 40 MILLIEQUIVALENT(S): at 06:34

## 2022-09-07 RX ADMIN — ENOXAPARIN SODIUM 70 MILLIGRAM(S): 100 INJECTION SUBCUTANEOUS at 17:25

## 2022-09-07 RX ADMIN — MEROPENEM 100 MILLIGRAM(S): 1 INJECTION INTRAVENOUS at 13:08

## 2022-09-07 RX ADMIN — ROTIGOTINE 1 PATCH: 8 PATCH, EXTENDED RELEASE TRANSDERMAL at 10:43

## 2022-09-07 RX ADMIN — ERTAPENEM SODIUM 120 MILLIGRAM(S): 1 INJECTION, POWDER, LYOPHILIZED, FOR SOLUTION INTRAMUSCULAR; INTRAVENOUS at 15:28

## 2022-09-07 RX ADMIN — Medication 2.5 MILLIGRAM(S): at 05:15

## 2022-09-07 RX ADMIN — POLYETHYLENE GLYCOL 3350 17 GRAM(S): 17 POWDER, FOR SOLUTION ORAL at 11:49

## 2022-09-07 RX ADMIN — Medication 40 MILLIEQUIVALENT(S): at 01:31

## 2022-09-07 RX ADMIN — PREGABALIN 1000 MICROGRAM(S): 225 CAPSULE ORAL at 11:49

## 2022-09-07 RX ADMIN — MIDODRINE HYDROCHLORIDE 5 MILLIGRAM(S): 2.5 TABLET ORAL at 11:49

## 2022-09-07 RX ADMIN — MIDODRINE HYDROCHLORIDE 5 MILLIGRAM(S): 2.5 TABLET ORAL at 17:25

## 2022-09-07 RX ADMIN — Medication 650 MILLIGRAM(S): at 23:11

## 2022-09-07 NOTE — PROGRESS NOTE ADULT - SUBJECTIVE AND OBJECTIVE BOX
Patient is a 72y old  Female who presents with a chief complaint of Lethargic and hypoxic (06 Sep 2022 05:52)      INTERVAL OVER NIGHT EVENTS:    MEDICATIONS  (STANDING):  baclofen 2.5 milliGRAM(s) Oral every 8 hours  cyanocobalamin 1000 MICROGram(s) Oral daily  Duopa Enteral Suspension 1 Bottle,carbidopa 4.63mg/levodopa 20mg per mL 72 mL/Day 72 mL/Day Enteral Tube Continuous Pump  enoxaparin Injectable 70 milliGRAM(s) SubCutaneous every 12 hours  famotidine    Tablet 20 milliGRAM(s) Oral daily  folic acid 1 milliGRAM(s) Oral daily  influenza  Vaccine (HIGH DOSE) 0.7 milliLiter(s) IntraMuscular once  meropenem  IVPB      meropenem  IVPB 1000 milliGRAM(s) IV Intermittent every 12 hours  midodrine. 5 milliGRAM(s) Oral three times a day  mirtazapine 45 milliGRAM(s) Oral at bedtime  polyethylene glycol 3350 17 Gram(s) Oral daily  rotigotine patch 4 mG/24 Hr(s) 1 Patch Transdermal every 24 hours  sertraline 100 milliGRAM(s) Oral daily    MEDICATIONS  (PRN):  acetaminophen     Tablet .. 650 milliGRAM(s) Oral every 6 hours PRN Temp greater or equal to 38C (100.4F), Moderate Pain (4 - 6)      Allergies    Ceclor (Rash)  IV Contrast (Hypotension)  latex (Rash; Urticaria)    Intolerances        REVIEW OF SYSTEMS:  Non verbal has fever.    Vital Signs Last 24 Hrs  T(C): 38.6 (06 Sep 2022 05:18), Max: 38.6 (06 Sep 2022 05:18)  T(F): 101.5 (06 Sep 2022 05:18), Max: 101.5 (06 Sep 2022 05:18)  HR: 84 (06 Sep 2022 05:18) (79 - 85)  BP: 108/64 (06 Sep 2022 05:18) (94/56 - 108/64)  BP(mean): --  RR: 18 (06 Sep 2022 05:18) (18 - 20)  SpO2: 94% (06 Sep 2022 05:18) (94% - 95%)    Parameters below as of 06 Sep 2022 05:18  Patient On (Oxygen Delivery Method): nasal cannula        PHYSICAL EXAM:  GENERAL: Non verbal  HEAD:  Atraumatic, Normocephalic  EYES: PERRLA, conjunctiva and sclera clear  ENMT:  Moist mucous membranes, Good dentition, No lesions  NECK: Supple, No JVD, Normal thyroid  NERVOUS SYSTEM:   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  CHEST/LUNG: Clear to percussion bilaterally; No rales, rhonchi, wheezing, or rubs  HEART: Regular rate and rhythm; No murmurs, rubs, or gallops  ABDOMEN: Soft, Nondistended; Bowel sounds present.  GT in place  EXTREMITIES:  2+ Peripheral Pulses, No clubbing, cyanosis, or edema  LYMPH: No lymphadenopathy noted  SKIN: No rashes or lesions    LABS:                        8.1    14.02 )-----------( 294      ( 06 Sep 2022 00:07 )             25.6     09-05    140  |  105  |  57<H>  ----------------------------<  85  3.3<L>   |  25  |  0.65    Ca    8.8      05 Sep 2022 06:51  Phos  4.1     09-05  Mg     2.3     09-05    TPro  6.6  /  Alb  2.0<L>  /  TBili  0.6  /  DBili  x   /  AST  28  /  ALT  11<L>  /  AlkPhos  185<H>  09-05        CAPILLARY BLOOD GLUCOSE          RADIOLOGY & ADDITIONAL TESTS:    Imaging Personally Reviewed:  [x] YES  [ ] NO    Consultant(s) Notes Reviewed:  [x] YES  [ ] NO    Care Discussed with Consultants/Other Providers [x] YES  [ ] NO   Patient is a 72y old  Female who presents with a chief complaint of Lethargic and hypoxic (06 Sep 2022 05:52)      INTERVAL OVER NIGHT EVENTS:    MEDICATIONS  (STANDING):  baclofen 2.5 milliGRAM(s) Oral every 8 hours  cyanocobalamin 1000 MICROGram(s) Oral daily  Duopa Enteral Suspension 1 Bottle,carbidopa 4.63mg/levodopa 20mg per mL 72 mL/Day 72 mL/Day Enteral Tube Continuous Pump  enoxaparin Injectable 70 milliGRAM(s) SubCutaneous every 12 hours  ertapenem  IVPB 1000 milliGRAM(s) IV Intermittent every 24 hours  famotidine    Tablet 20 milliGRAM(s) Oral daily  folic acid 1 milliGRAM(s) Oral daily  influenza  Vaccine (HIGH DOSE) 0.7 milliLiter(s) IntraMuscular once  midodrine. 5 milliGRAM(s) Oral three times a day  mirtazapine 45 milliGRAM(s) Oral at bedtime  polyethylene glycol 3350 17 Gram(s) Oral daily  rotigotine patch 4 mG/24 Hr(s) 1 Patch Transdermal every 24 hours  sertraline 100 milliGRAM(s) Oral daily    MEDICATIONS  (PRN):  acetaminophen     Tablet .. 650 milliGRAM(s) Oral every 6 hours PRN Temp greater or equal to 38C (100.4F), Moderate Pain (4 - 6)    Allergies    Ceclor (Rash)  IV Contrast (Hypotension)  latex (Rash; Urticaria)    Intolerances    REVIEW OF SYSTEMS:  Non verbal has fever.    Vital Signs Last 24 Hrs  T(C): 38.4 (07 Sep 2022 21:18), Max: 38.8 (07 Sep 2022 04:49)  T(F): 101.2 (07 Sep 2022 21:18), Max: 101.8 (07 Sep 2022 04:49)  HR: 100 (07 Sep 2022 21:18) (94 - 100)  BP: 107/74 (07 Sep 2022 21:18) (102/64 - 117/72)  BP(mean): --  RR: 17 (07 Sep 2022 21:18) (17 - 19)  SpO2: 93% (07 Sep 2022 21:18) (93% - 94%)    Parameters below as of 07 Sep 2022 21:18  Patient On (Oxygen Delivery Method): room air    PHYSICAL EXAM:  GENERAL: Non verbal  HEAD:  Atraumatic, Normocephalic  EYES: PERRLA, conjunctiva and sclera clear  ENMT:  Moist mucous membranes, Good dentition, No lesions  NECK: Supple, No JVD, Normal thyroid  NERVOUS SYSTEM:   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  CHEST/LUNG: Clear to percussion bilaterally; No rales, rhonchi, wheezing, or rubs  HEART: Regular rate and rhythm; No murmurs, rubs, or gallops  ABDOMEN: Soft, Nondistended; Bowel sounds present.  GT in place  EXTREMITIES:  2+ Peripheral Pulses, No clubbing, cyanosis, or edema  LYMPH: No lymphadenopathy noted  SKIN: No rashes or lesions    LABS:                        8.7    17.45 )-----------( 443      ( 07 Sep 2022 06:47 )             28.2     07 Sep 2022 06:47    152    |  117    |  31     ----------------------------<  180    3.9     |  30     |  0.50     Ca    8.4        07 Sep 2022 06:47  Mg     2.0       07 Sep 2022 06:47    TPro  6.8    /  Alb  2.2    /  TBili  0.5    /  DBili  x      /  AST  40     /  ALT  14     /  AlkPhos  193    06 Sep 2022 06:58    LIVER FUNCTIONS - ( 06 Sep 2022 06:58 )  Alb: 2.2 g/dL / Pro: 6.8 g/dL / ALK PHOS: 193 U/L / ALT: 14 U/L / AST: 40 U/L / GGT: x             CAPILLARY BLOOD GLUCOSE                                  8.1    14.02 )-----------( 294      ( 06 Sep 2022 00:07 )             25.6     09-05    140  |  105  |  57<H>  ----------------------------<  85  3.3<L>   |  25  |  0.65    Ca    8.8      05 Sep 2022 06:51  Phos  4.1     09-05  Mg     2.3     09-05    TPro  6.6  /  Alb  2.0<L>  /  TBili  0.6  /  DBili  x   /  AST  28  /  ALT  11<L>  /  AlkPhos  185<H>  09-05        CAPILLARY BLOOD GLUCOSE          RADIOLOGY & ADDITIONAL TESTS:    Imaging Personally Reviewed:  [x] YES  [ ] NO    Consultant(s) Notes Reviewed:  [x] YES  [ ] NO    Care Discussed with Consultants/Other Providers [x] YES  [ ] NO

## 2022-09-07 NOTE — CONSULT NOTE ADULT - ASSESSMENT
Patient presents with    Deep tissue pressure injuries (DTPI):  located at:   1. sacral region  8 x 8:   2. left heel 6 x 6,   3. right heel 6 x 6,   4 left bunion 1.5 x 2,   5. left 4th and 5th  anterior toes 0.5 x 0.5,   6. right forth and 5th toes 0.5 x 0.5     all DTPIs periwound with blanchable erythema, skin intact no s/s pain/discomfort  pint with cavilon daily  sacral region: pain with cavilon daily                        apply TRIAD BID and PRN      Patient is on a low air loss mattress  recommendation:   microclimate bed    This pressure injury is community acquired /YES  At risk for altered tissue perfusion /YES  Impaired perfusion of peripheral tissue /YES  Continue  Nutrition (as tolerated)  Continue  Offloading   Continue Pericare  Care as per medicine will follow w/ you  Findings and recommendations discussed with SHIRIN Long  Thank you for this consult  Lo Yin NP, [facility] [phone]

## 2022-09-07 NOTE — PROVIDER CONTACT NOTE (CRITICAL VALUE NOTIFICATION) - ACTION/TREATMENT ORDERED:
fluid resus/antibiotics, repeat lactate
IV antibiotics ordered, ID on the case
Patient on antibiotics
No interventions needed at this time. will reassess in AM.

## 2022-09-07 NOTE — CONSULT NOTE ADULT - SUBJECTIVE AND OBJECTIVE BOX
Date/Time Patient Seen:  		  Referring MD:   Data Reviewed	       Patient is a 72y old  Female who presents with a chief complaint of Lethargic and hypoxic (05 Sep 2022 08:34)      Subjective/HPI  vs noted  labs reviewed  er provider note reviewed  h and p reviewed  ct head noted  cxr noted  ID eval noted  overnight events noted     72[facility]  PAST MEDICAL & SURGICAL HISTORY:  Constipation    Unilateral primary osteoarthritis, left hip    Parkinson&#x27;s disease    Basal cell carcinoma  scalp    GERD (gastroesophageal reflux disease)    Seasonal allergies    Depression    Acute respiratory failure with hypoxia    S/P deep brain stimulator placement  2006, 2 brain pacemakers Model #41691, batteries replaced 2014    History of   10/1982    H/O ovarian cystectomy  right,     H/O colonoscopy  , 2008 polypectomy, ,     Basal cell carcinoma  removal, mid vertex scalp, 2002    Abdominal hernia  with mesh, 2003          Medication list         MEDICATIONS  (STANDING):  baclofen 2.5 milliGRAM(s) Oral every 8 hours  cyanocobalamin 1000 MICROGram(s) Oral daily  Duopa Enteral Suspension 1 Bottle,carbidopa 4.63mg/levodopa 20mg per mL 72 mL/Day 72 mL/Day Enteral Tube Continuous Pump  famotidine    Tablet 20 milliGRAM(s) Oral daily  folic acid 1 milliGRAM(s) Oral daily  heparin   Injectable 5000 Unit(s) SubCutaneous every 12 hours  influenza  Vaccine (HIGH DOSE) 0.7 milliLiter(s) IntraMuscular once  meropenem  IVPB      meropenem  IVPB 1000 milliGRAM(s) IV Intermittent every 12 hours  midodrine. 5 milliGRAM(s) Oral three times a day  mirtazapine 45 milliGRAM(s) Oral at bedtime  polyethylene glycol 3350 17 Gram(s) Oral daily  rotigotine patch 4 mG/24 Hr(s) 1 Patch Transdermal every 24 hours  sertraline 100 milliGRAM(s) Oral daily    MEDICATIONS  (PRN):  acetaminophen     Tablet .. 650 milliGRAM(s) Oral every 6 hours PRN Temp greater or equal to 38C (100.4F), Moderate Pain (4 - 6)         Vitals log        ICU Vital Signs Last 24 Hrs  T(C): 37.2 (05 Sep 2022 05:37), Max: 37.2 (05 Sep 2022 05:37)  T(F): 98.9 (05 Sep 2022 05:37), Max: 98.9 (05 Sep 2022 05:37)  HR: 90 (05 Sep 2022 05:37) (90 - 100)  BP: 92/56 (05 Sep 2022 05:37) (91/54 - 98/54)  BP(mean): --  ABP: --  ABP(mean): --  RR: 18 (05 Sep 2022 05:37) (18 - 19)  SpO2: 90% (05 Sep 2022 05:37) (90% - 97%)    O2 Parameters below as of 05 Sep 2022 05:37  Patient On (Oxygen Delivery Method): nasal cannula                 Input and Output:  I&O's Detail    04 Sep 2022 07:01  -  05 Sep 2022 07:00  --------------------------------------------------------  IN:  Total IN: 0 mL    OUT:    Indwelling Catheter - Urethral (mL): 500 mL  Total OUT: 500 mL    Total NET: -500 mL          Lab Data                        7.9    10.86 )-----------( 279      ( 05 Sep 2022 06:51 )             24.5     09-05    140  |  105  |  57<H>  ----------------------------<  85  3.3<L>   |  25  |  0.65    Ca    8.8      05 Sep 2022 06:51  Phos  4.1     -  Mg     2.3     -    TPro  6.6  /  Alb  2.0<L>  /  TBili  0.6  /  DBili  x   /  AST  28  /  ALT  11<L>  /  AlkPhos  185<H>  09-05    ABG - ( 04 Sep 2022 00:25 )  pH, Arterial: 7.55  pH, Blood: x     /  pCO2: 33    /  pO2: 103   / HCO3: 29    / Base Excess: 6.5   /  SaO2: 99.6               Tobacco Usage:  · Tobacco Usage	Never smoker    ALLERGIES AND HOME MEDICATIONS:   Allergies:        Allergies:  	latex: Latex, Rash, Urticaria  	Ceclor: Drug, Rash    Home Medications:   * Patient Currently Takes Medications as of 04-Sep-2022 00:25 documented in Structured Notes  · 	acetaminophen 325 mg oral tablet: Last Dose Taken:  , 3 tab(s) orally every 8 hours  · 	Neupro 4 mg/24 hr transdermal film, extended release: Last Dose Taken:  , 1 patch transdermal once a day  · 	Duopa 4.63 mg-20 mg/mL enteral suspension: Last Dose Taken:  , 1 milliliter(s) enteral every hour via J-tube  · 	MiraLax oral powder for reconstitution: Last Dose Taken:  , 34 gram(s) orally once a day, As Needed  · 	folic acid 1 mg oral tablet: Last Dose Taken:  , 1 tab(s) by gastrostomy tube once a day  · 	mirtazapine 45 mg oral tablet: Last Dose Taken:  , 1 tab(s) by gastrostomy tube once a day (at bedtime)  · 	omeprazole 20 mg oral delayed release capsule: Last Dose Taken:  , 1 cap(s) orally once a day, As Needed  · 	cyanocobalamin 1000 mcg/mL injectable solution: Last Dose Taken:  , injectable once a month  · 	sertraline 100 mg oral tablet: Last Dose Taken:  , 1 tab(s) by gastrostomy tube once a day  · 	midodrine 5 mg oral tablet: Last Dose Taken:  , 1 tab(s) by gastrostomy tube 3 times a day  · 	baclofen 10 mg oral tablet: Last Dose Taken:  , 1 tab(s) by gastrostomy tube 2 times a day  · 	ondansetron 2 mg/mL injectable solution: Last Dose Taken:  , 4 milligram(s) injectable every 4 hours, As Needed    REVIEW OF SYSTEMS:    Review of Systems:  · CONSTITUTIONAL: no fever and no chills.  · CARDIOVASCULAR: no chest pain and no edema.  · RESPIRATORY: - - -  · Respiratory [+]: SHORTNESS OF BREATH  · NEUROLOGICAL: - - -  · Neurological [+]: CHANGE IN LEVEL OF CONSCIOUSNESS  · ROS STATEMENT: all other ROS negative except as per HPI          Review of Systems	  hypoxemia  frailty      Objective     Physical Examination    o2 support  heart s1s2  lung dec BS  peg  head nc      Pertinent Lab findings & Imaging      Yoel:  NO   Adequate UO     I&O's Detail    04 Sep 2022 07:01  -  05 Sep 2022 07:00  --------------------------------------------------------  IN:  Total IN: 0 mL    OUT:    Indwelling Catheter - Urethral (mL): 500 mL  Total OUT: 500 mL    Total NET: -500 mL               Discussed with:     Cultures:	        Radiology    ACC: [id number] EXAM:  XR CHEST PORTABLE URGENT 1V                          PROCEDURE DATE:  2022          INTERPRETATION:  CLINICAL INDICATION: Sepsis    TECHNIQUE: Frontal chest radiograph on 2022 1:31 AM    COMPARISON: None.    FINDINGS:  The mid by patient position. Neurostimulator overlies each chest   bilaterally. Opacity left lower lung; effusion with overlying atelectasis   and/or infiltrate. The cardiomediastinal silhouette is within normal   limits. No significant osseous abnormality. IVC filter partially imaged.      IMPRESSION:  Opacity left lower lung; effusion with overlying atelectasis and/or   infiltrate.    --- End of Report ---            RIVERA ROBERTS MD; Attending Radiologist  This document has been electronically signed. Sep  4 2022  4:57PM          ACC: [id number] EXAM:  CT BRAIN                          PROCEDURE DATE:  2022          INTERPRETATION:  CLINICAL INFORMATION: Altered mental status.    TECHNIQUE:  Axial CT images were acquired through the head.  Intravenous contrast: None  Two-dimensional reformats were generated.    COMPARISON STUDY: MRI brain from 2010.    FINDINGS:  There are deep brain stimulator electrodes terminating in the regions of   the subthalamic nuclei bilaterally.    Within limits of extensive metallic streak artifact caused by the deep   brain stimulator electrodes, there is no evidence of acute intracranial   hemorrhage, mass effect, midline shift or acute territorial infarct.    There is moderate generalized cerebral volume loss and mild   periventricular white matter hypoattenuation compatible chronic   microvascular ischemic disease..    The visualized paranasal sinuses are grossly clear.    There is a nonspecific trace right mastoid effusion.  There is moderate   cerumen in the right external auditory canal.    The left mastoid is grossly clear.  There is trace cerumen in the left   external auditory canal.    The patient is status post right intraocular lens replacement.    The calvarium and skull base appear within normal limits.    IMPRESSION:  There is extensive metallic streak artifact caused by deep brain   stimulator electrodes.  No gross CT evidence of acute intracranial   pathology.  Follow-up MRI may be obtained for more central herniation.    Nonspecific trace right mastoid effusion.    Moderate cerumen in the right external auditory canal and trace cerumen   in the left external auditory canal    --- End of Report ---            ROBERTO CARLOS REBOLLEDO MD; Attending Radiologist  This document has been electronically signed. Sep  4 2022  4:01AM                  
HPI  location, quality, severity, duration, timing, context, modifying factors, associated signs/symptoms  HPI:  MATEO LESLIE [facility] 72[facility].   Patient is a terminal case of PD.   At ED arrival her O2 sat was 72%.  Patient DNR/DNI started on 100% NRBM and her saturation improved.   Patient is non communicative most of informations received from [facility] facility and from spouse.  Patient has PEG/JT and on feeding and Parkinson's Disease medications.   (04 Sep 2022 02:10)      PAST MEDICAL & SURGICAL HISTORY:  Constipation      Unilateral primary osteoarthritis, left hip      Parkinson&#x27;s disease      Basal cell carcinoma  scalp      GERD (gastroesophageal reflux disease)      Seasonal allergies      Depression      Acute respiratory failure with hypoxia      S/P deep brain stimulator placement  2006, 2 brain pacemakers Model #61327, batteries replaced 2014      History of   10/1982      H/O ovarian cystectomy  right,       H/O colonoscopy  , 2008 polypectomy, ,       Basal cell carcinoma  removal, mid vertex scalp, 2002      Abdominal hernia  with mesh, 2003        REVIEW OF SYSTEMS  Unable to obtain ROS  2/2 non-verbal status        MEDICATIONS  (STANDING):  baclofen 2.5 milliGRAM(s) Oral every 8 hours  cyanocobalamin 1000 MICROGram(s) Oral daily  Duopa Enteral Suspension 1 Bottle,carbidopa 4.63mg/levodopa 20mg per mL 72 mL/Day 72 mL/Day Enteral Tube Continuous Pump  enoxaparin Injectable 70 milliGRAM(s) SubCutaneous every 12 hours  famotidine    Tablet 20 milliGRAM(s) Oral daily  folic acid 1 milliGRAM(s) Oral daily  influenza  Vaccine (HIGH DOSE) 0.7 milliLiter(s) IntraMuscular once  meropenem  IVPB 1000 milliGRAM(s) IV Intermittent every 8 hours  midodrine. 5 milliGRAM(s) Oral three times a day  mirtazapine 45 milliGRAM(s) Oral at bedtime  polyethylene glycol 3350 17 Gram(s) Oral daily  rotigotine patch 4 mG/24 Hr(s) 1 Patch Transdermal every 24 hours  sertraline 100 milliGRAM(s) Oral daily    MEDICATIONS  (PRN):  acetaminophen     Tablet .. 650 milliGRAM(s) Oral every 6 hours PRN Temp greater or equal to 38C (100.4F), Moderate Pain (4 - 6)      Allergies    Ceclor (Rash)  IV Contrast (Hypotension)  latex (Rash; Urticaria)    Intolerances      FAMILY HISTORY:  No pertinent family history in first degree relatives        Vital Signs Last 24 Hrs  T(C): 36.9 (07 Sep 2022 12:36), Max: 39.4 (06 Sep 2022 14:30)  T(F): 98.4 (07 Sep 2022 12:36), Max: 102.9 (06 Sep 2022 14:30)  HR: 96 (07 Sep 2022 12:36) (94 - 106)  BP: 102/64 (07 Sep 2022 12:36) (102/64 - 145/71)  BP(mean): --  RR: 19 (07 Sep 2022 12:36) (17 - 19)  SpO2: 94% (07 Sep 2022 12:36) (92% - 94%)    Parameters below as of 07 Sep 2022 12:36  Patient On (Oxygen Delivery Method): room air                              8.7    17.45 )-----------( 443      ( 07 Sep 2022 06:47 )             28.2         152<H>  |  117<H>  |  31<H>  ----------------------------<  180<H>  3.9   |  30  |  0.50    Ca    8.4<L>      07 Sep 2022 06:47  Mg     2.0         TPro  6.8  /  Alb  2.2<L>  /  TBili  0.5  /  DBili  x   /  AST  40<H>  /  ALT  14  /  AlkPhos  193<H>      Auto Neutrophil #: 12.51 K/uL (22 @ 06:47)    A1C with Estimated Average Glucose Result: 5.6 % (22 @ 00:07)      PHYSICAL EXAM:    General: resting comfortably in bed; NAD  ENT: no nasal discharge;  Neck: trach in place/vented  Extremities: bilateral foot drop   Dermatologic: Deep tissue pressure injuries (DTPI)  located sacral region  8 x 8: left heel 6 x 6, right heel 6 x 6, left bunion 1.5 x 2, left 4th and 5th  anterior toes 0.5 x 0.5, right forth and 5th toes 0.5 x 0.5 all periwound with blanchable erythema, skin intact no s/s pain/discomfort   Neurologic:  Can not follow commands                
[facility]FECTIOUS DISEASE  Cash Saini MD PhD, Mya Romero MD, Deann Zamarripa MD, Baldev Haynes MD, Chong Johns MD  and providing coverage with Lucinda Reynoso MD  Providing Infectious Disease Consultations at [facility], [facility], [place], [facility], [facility]'s    Office# [phone] to schedule follow up appointments  Answering Service for urgent calls or New Consults [phone]  Cell# to text for urgent issues Cash Saini [phone]     HPI:  MATEO LESLIE is a 72y [facility].   Patient is a terminal case of PD.   At ED arrival her O2 sat was 72%.  Patient DNR/DNI started on 100% NRBM and her saturation improved.   Patient is non communicative most of informations received from [facility] facility and from spouse.  Patient has PEG/JT and on feeding and Parkinson's Disease medications.      PAST MEDICAL & SURGICAL HISTORY:  Constipation  Unilateral primary osteoarthritis, left hip  Parkinsons disease  Basal cell carcinoma  scalp  GERD (gastroesophageal reflux disease)  Seasonal allergies  Depression  Acute respiratory failure with hypoxia  S/P deep brain stimulator placement  2006, 2 brain pacemakers Model #74653, batteries replaced 2014      History of   10/1982      H/O ovarian cystectomy  right,       H/O colonoscopy  , 2008 polypectomy, ,       Basal cell carcinoma  removal, mid vertex scalp, 2002      Abdominal hernia  with mesh, 2003          Antimicrobials  meropenem  IVPB      meropenem  IVPB 1000 milliGRAM(s) IV Intermittent every 12 hours      Immunological  influenza  Vaccine (HIGH DOSE) 0.7 milliLiter(s) IntraMuscular once      Other  acetaminophen     Tablet .. 650 milliGRAM(s) Oral every 6 hours PRN  baclofen 2.5 milliGRAM(s) Oral every 8 hours  cyanocobalamin 1000 MICROGram(s) Oral daily  Duopa Enteral Suspension 1 Bottle,carbidopa 4.63mg/levodopa 20mg per mL 72 mL/Day 72 mL/Day Enteral Tube Continuous Pump  famotidine    Tablet 20 milliGRAM(s) Oral daily  folic acid 1 milliGRAM(s) Oral daily  heparin   Injectable 5000 Unit(s) SubCutaneous every 12 hours  midodrine. 5 milliGRAM(s) Oral three times a day  mirtazapine 45 milliGRAM(s) Oral at bedtime  polyethylene glycol 3350 17 Gram(s) Oral daily  rotigotine patch 4 mG/24 Hr(s) 1 Patch Transdermal every 24 hours  sertraline 100 milliGRAM(s) Oral daily      Allergies    Ceclor (Rash)  latex (Rash; Urticaria)    Intolerances        SOCIAL HISTORY:  no toxic habits reported      FAMILY HISTORY:  No pertinent family history in first degree relatives        ROS:    limited by cognitive status    Vital Signs Last 24 Hrs  T(C): 37.1 (05 Sep 2022 11:40), Max: 37.2 (05 Sep 2022 05:37)  T(F): 98.8 (05 Sep 2022 11:40), Max: 98.9 (05 Sep 2022 05:37)  HR: 85 (05 Sep 2022 11:40) (85 - 100)  BP: 97/56 (05 Sep 2022 11:40) (91/54 - 98/54)  BP(mean): --  RR: 18 (05 Sep 2022 11:40) (18 - 19)  SpO2: 94% (05 Sep 2022 11:40) (90% - 97%)    Parameters below as of 05 Sep 2022 11:40  Patient On (Oxygen Delivery Method): nasal cannula  O2 Flow (L/min): 3      PE:  frail older woman  HEENT:  NC, PERRL, sclerae anicteric, conjunctivae clear, EOMI.  Sinuses nontender, no nasal exudate.  No buccal or pharyngeal lesions, erythema or exudate  Neck:  Supple, no adenopathy  Lungs:  No accessory muscle use, bilaterally clear to auscultation but difficult exam  Cor:  distant  Abd:  Symmetric, normoactive BS.  Soft, nontender, no masses, guarding or rebound.  Liver and spleen not enlarged, PEG  Extrem:  No cyanosis or edema  Skin:  No rashes.  Neuro: nonverbal, very limited        LABS:                        7.9    10.86 )-----------( 279      ( 05 Sep 2022 06:51 )             24.5       WBC Count: 10.86 K/uL (22 @ 06:51)  WBC Count: 10.42 K/uL (22 @ 06:24)  WBC Count: 10.65 K/uL (22 @ 01:03)          140  |  105  |  57<H>  ----------------------------<  85  3.3<L>   |  25  |  0.65    Ca    8.8      05 Sep 2022 06:51  Phos  4.1       Mg     2.3         TPro  6.6  /  Alb  2.0<L>  /  TBili  0.6  /  DBili  x   /  AST  28  /  ALT  11<L>  /  AlkPhos  185<H>        Creatinine, Serum: 0.65 mg/dL (22 @ 06:51)  Creatinine, Serum: 1.10 mg/dL (22 @ 06:24)  Creatinine, Serum: 1.50 mg/dL (22 @ 01:03)      Urinalysis Basic - ( 04 Sep 2022 01:00 )    Color: Yellow / Appearance: mucous / S.010 / pH: x  Gluc: x / Ketone: Negative  / Bili: Negative / Urobili: Negative   Blood: x / Protein: 100 / Nitrite: Negative   Leuk Esterase: Moderate / RBC: 3-5 /HPF / WBC 11-25   Sq Epi: x / Non Sq Epi: Occasional / Bacteria: Many              MICROBIOLOGY:      RADIOLOGY & ADDITIONAL STUDIES:    --< from: Xray Chest 1 View- PORTABLE-Urgent (22 @ 01:31) >  ACC: [id number] EXAM:  XR CHEST PORTABLE URGENT 1V                          PROCEDURE DATE:  2022          INTERPRETATION:  CLINICAL INDICATION: Sepsis    TECHNIQUE: Frontal chest radiograph on 2022 1:31 AM    COMPARISON: None.    FINDINGS:  The mid by patient position. Neurostimulator overlies each chest   bilaterally. Opacity left lower lung; effusion with overlying atelectasis   and/or infiltrate. The cardiomediastinal silhouette is within normal   limits. No significant osseous abnormality. IVC filter partially imaged.      IMPRESSION:  Opacity left lower lung; effusion with overlying atelectasis and/or   infiltrate.

## 2022-09-07 NOTE — PROGRESS NOTE ADULT - SUBJECTIVE AND OBJECTIVE BOX
[facility] DIVISION of INFECTIOUS DISEASE  Cash Saini MD PhD, Mya Romero MD, Deann Zamarripa MD, Baldev Haynes MD, Chong Johns MD  and providing coverage with Lucinda Reynoso MD  Providing Infectious Disease Consultations at [facility], [facility], [facility]'s    Office# [phone] to schedule follow up appointments  Answering Service for urgent calls or New Consults [phone]  Cell# to text for urgent issues Cash Saini [phone]     infectious diseases progress note:    MATEO LESLIE is a 72y y. o. Female patient    Overnight and events of the last 24hrs reviewed    Allergies    Ceclor (Rash)  IV Contrast (Hypotension)  latex (Rash; Urticaria)    Intolerances        ANTIBIOTICS/RELEVANT:  antimicrobials  meropenem  IVPB 1000 milliGRAM(s) IV Intermittent every 8 hours    immunologic:  influenza  Vaccine (HIGH DOSE) 0.7 milliLiter(s) IntraMuscular once    OTHER:  acetaminophen     Tablet .. 650 milliGRAM(s) Oral every 6 hours PRN  baclofen 2.5 milliGRAM(s) Oral every 8 hours  cyanocobalamin 1000 MICROGram(s) Oral daily  Duopa Enteral Suspension 1 Bottle,carbidopa 4.63mg/levodopa 20mg per mL 72 mL/Day 72 mL/Day Enteral Tube Continuous Pump  enoxaparin Injectable 70 milliGRAM(s) SubCutaneous every 12 hours  famotidine    Tablet 20 milliGRAM(s) Oral daily  folic acid 1 milliGRAM(s) Oral daily  midodrine. 5 milliGRAM(s) Oral three times a day  mirtazapine 45 milliGRAM(s) Oral at bedtime  polyethylene glycol 3350 17 Gram(s) Oral daily  rotigotine patch 4 mG/24 Hr(s) 1 Patch Transdermal every 24 hours  sertraline 100 milliGRAM(s) Oral daily      Objective:  Vital Signs Last 24 Hrs  T(C): 36.9 (07 Sep 2022 12:36), Max: 39.4 (06 Sep 2022 14:30)  T(F): 98.4 (07 Sep 2022 12:36), Max: 102.9 (06 Sep 2022 14:30)  HR: 96 (07 Sep 2022 12:36) (94 - 106)  BP: 102/64 (07 Sep 2022 12:36) (102/64 - 145/71)  BP(mean): --  RR: 19 (07 Sep 2022 12:36) (17 - 19)  SpO2: 94% (07 Sep 2022 12:36) (92% - 94%)    Parameters below as of 07 Sep 2022 12:36  Patient On (Oxygen Delivery Method): room air        T(C): 36.9 (09-07-22 @ 12:36), Max: 39.6 (09-06-22 @ 11:36)  T(C): 36.9 (09-07-22 @ 12:36), Max: 39.6 (09-06-22 @ 11:36)  T(C): 36.9 (09-07-22 @ 12:36), Max: 39.6 (09-06-22 @ 11:36)    PHYSICAL EXAM:  HEENT: NC atraumatic  Neck: supple  Respiratory: no accessory muscle use, breathing comfortably  Cardiovascular: distant  Gastrointestinal: normal appearing, nondistended, clean PEG  Extremities: no clubbing, no cyanosis,  Neruro: limited nonverbal        LABS:                          8.7    17.45 )-----------( 443      ( 07 Sep 2022 06:47 )             28.2       WBC  17.45 09-07 @ 06:47  14.02 09-06 @ 00:07  10.86 09-05 @ 06:51  10.42 09-04 @ 06:24  10.65 09-04 @ 01:03      09-07    152<H>  |  117<H>  |  31<H>  ----------------------------<  180<H>  3.9   |  30  |  0.50    Ca    8.4<L>      07 Sep 2022 06:47  Mg     2.0     09-07    TPro  6.8  /  Alb  2.2<L>  /  TBili  0.5  /  DBili  x   /  AST  40<H>  /  ALT  14  /  AlkPhos  193<H>  09-06      Creatinine, Serum: 0.50 mg/dL (09-07-22 @ 06:47)  Creatinine, Serum: 0.56 mg/dL (09-06-22 @ 06:58)  Creatinine, Serum: 0.65 mg/dL (09-05-22 @ 06:51)  Creatinine, Serum: 1.10 mg/dL (09-04-22 @ 06:24)  Creatinine, Serum: 1.50 mg/dL (09-04-22 @ 01:03)                INFLAMMATORY MARKERS  Auto Neutrophil #: 12.51 K/uL (09-07-22 @ 06:47)  Auto Lymphocyte #: 2.29 K/uL (09-07-22 @ 06:47)  Auto Neutrophil #: 8.77 K/uL (09-05-22 @ 06:51)  Auto Lymphocyte #: 1.21 K/uL (09-05-22 @ 06:51)  Auto Neutrophil #: 8.59 K/uL (09-04-22 @ 06:24)  Auto Lymphocyte #: 1.16 K/uL (09-04-22 @ 06:24)  Auto Lymphocyte #: 1.49 K/uL (09-04-22 @ 01:03)  Auto Neutrophil #: 8.63 K/uL (09-04-22 @ 01:03)    Lactate, Blood: 1.9 mmol/L (09-04-22 @ 06:24)  Lactate, Blood: 2.7 mmol/L (09-04-22 @ 02:51)  Lactate, Blood: 2.8 mmol/L (09-04-22 @ 01:03)    Auto Eosinophil #: 0.04 K/uL (09-07-22 @ 06:47)  Auto Eosinophil #: 0.13 K/uL (09-05-22 @ 06:51)  Auto Eosinophil #: 0.04 K/uL (09-04-22 @ 06:24)  Auto Eosinophil #: 0.00 K/uL (09-04-22 @ 01:03)      Ferritin, Serum: 419 ng/mL (09-04-22 @ 02:51)        Activated Partial Thromboplastin Time: 26.9 sec (09-04-22 @ 01:03)  INR: 1.09 ratio (09-04-22 @ 01:03)    D-Dimer Assay, Quantitative: 1000 ng/mL DDU (09-06-22 @ 06:58)  D-Dimer Assay, Quantitative: 1731 ng/mL DDU (09-04-22 @ 08:42)        MICROBIOLOGY:    Culture - Blood (09.04.22 @ 01:16)    -  CTX-M Resistance Marker: Detec    -  Proteus species: Detec    -  Amikacin: S <=16    Gram Stain:   Growth in aerobic and anaerobic bottles: Gram Negative Rods    -  Ampicillin: R >16 These ampicillin results predict results for amoxicillin    -  Ampicillin/Sulbactam: R 8/4 Enterobacter, Klebsiella aerogenes, Citrobacter, and Serratia may develop resistance during prolonged therapy (3-4 days)    -  Aztreonam: R <=4    -  Cefazolin: R >16 Enterobacter, Klebsiella aerogenes, Citrobacter, and Serratia may develop resistance during prolonged therapy (3-4 days)    -  Cefepime: R >16    -  Ceftriaxone: R >32 Enterobacter, Klebsiella aerogenes, Citrobacter, and Serratia may develop resistance during prolonged therapy    -  Ciprofloxacin: R >2    -  Ertapenem: S <=0.5    -  Gentamicin: R >8    -  Meropenem: S <=1    -  Levofloxacin: R >4    -  Piperacillin/Tazobactam: R <=8    -  Tobramycin: I 8    -  Trimethoprim/Sulfamethoxazole: R >2/38    -  ESBL: Detec    Specimen Source: .Blood Blood-Peripheral    Organism: Blood Culture PCR    Organism: Proteus mirabilis ESBL    Culture Results:   Growth in aerobic and anaerobic bottles: Proteus mirabilis ESBL  ***Blood Panel PCR results on this specimen are available  approximately 3 hours after the Gram stain result.***  Gram stain, PCR, and/or culture results may not always  correspond due to difference in methodologies.  ************************************************************  This PCR assay was performed by multiplex PCR. This  Assay tests for 66 bacterial and resistance gene targets.  Please refer to the [facility] test directory  at [email or web address] for details.    Organism Identification: Blood Culture PCR  Proteus mirabilis ESBL    Method Type: PCR    Method Type: KEN        RADIOLOGY & ADDITIONAL STUDIES:

## 2022-09-07 NOTE — PROGRESS NOTE ADULT - ASSESSMENT
[facility] Infectious Diseases  Pt well know to our group from care at [facility]     72y Female transferred from [facility] facility of lethargy and hypoxia.   Patient is a terminal case of PD.   At ED arrival her O2 sat was 72%.  Patient DNR/DNI started on 100% NRBM and her saturation improved.   Patient is non communicative most of informations received from [facility] facility and from spouse.  Patient has PEG/JT and on feeding and Parkinson's Disease medications.  Blood culture (collected 9/4 1am)    -  ESBL: Detec    -  Proteus species: Detec    -  CTX-M Resistance Marker: Detec    RECOMMENDATIONS  PT with acute respiratory distress and on imaging suggestion of LLL PNA. And now noted to have ESBL Proteus bacteremia  recommended meropenem first day 9/5 9/7 will change to ertapenem but noted elevated wbc but clinically appears slightly improved    repeat blood cultures collected 9/6-NGTD  -will order rpt blood cultures to document clearance and inform duration recommendations  -ESBL proteus in urine so potential source as acute pyelonephritis with bacteremia    All interventions in keeping with goals of care     Thank you for consulting us and involving us in the management of this most interesting and challenging case.  We will follow along in the care of this patient. Please call us at [phone] or text me directly on my cell# at [phone] with any concerns.

## 2022-09-07 NOTE — PROGRESS NOTE ADULT - ASSESSMENT
MATEO LESLIE is a 72y Female transferred from [facility] facility of lethargy and hypoxia.   Patient is a terminal case of PD.   9/6/22 patient has fever, Blood and urine cultures in progress.  ID and pulmonary on the case

## 2022-09-07 NOTE — PROVIDER CONTACT NOTE (CRITICAL VALUE NOTIFICATION) - TEST AND RESULT REPORTED:
Blood Culture
lactate 2.8
gram - growth in blood cultures, in aerobic and anaerobic
repeat lactate 2.7
urine cx esbl blood culture esbl mirabilis 9/4
positive blood culture blood C/S 9/4 growth in aerobic bottle gram negative rods and gram positive cocci in pairs

## 2022-09-07 NOTE — PHYSICAL THERAPY INITIAL EVALUATION ADULT - ADDITIONAL COMMENTS
Patient is non-verbal. Per care manager note: Pt lives in LTC facility. Pt spouse reported that pt was receiving PT prior to admission as SNF. Pt spouse stated that pt is a two person assist and is mainly bedbound.

## 2022-09-07 NOTE — PROGRESS NOTE ADULT - SUBJECTIVE AND OBJECTIVE BOX
Date/Time Patient Seen:  		  Referring MD:   Data Reviewed	       Patient is a 72y old  Female who presents with a chief complaint of Fever due to unspecified condition     (06 Sep 2022 13:09)      Subjective/HPI     PAST MEDICAL & SURGICAL HISTORY:  Constipation    Unilateral primary osteoarthritis, left hip    Parkinson&#x27;s disease    Basal cell carcinoma  scalp    GERD (gastroesophageal reflux disease)    Seasonal allergies    Depression    Acute respiratory failure with hypoxia    S/P deep brain stimulator placement  2006, 2 brain pacemakers Model #04200, batteries replaced 2014    History of   10/1982    H/O ovarian cystectomy  right,     H/O colonoscopy  , 2008 polypectomy, ,     Basal cell carcinoma  removal, mid vertex scalp, 2002    Abdominal hernia  with mesh, 2003          Medication list         MEDICATIONS  (STANDING):  baclofen 2.5 milliGRAM(s) Oral every 8 hours  cyanocobalamin 1000 MICROGram(s) Oral daily  Duopa Enteral Suspension 1 Bottle,carbidopa 4.63mg/levodopa 20mg per mL 72 mL/Day 72 mL/Day Enteral Tube Continuous Pump  enoxaparin Injectable 70 milliGRAM(s) SubCutaneous every 12 hours  famotidine    Tablet 20 milliGRAM(s) Oral daily  folic acid 1 milliGRAM(s) Oral daily  influenza  Vaccine (HIGH DOSE) 0.7 milliLiter(s) IntraMuscular once  meropenem  IVPB 1000 milliGRAM(s) IV Intermittent every 8 hours  midodrine. 5 milliGRAM(s) Oral three times a day  mirtazapine 45 milliGRAM(s) Oral at bedtime  polyethylene glycol 3350 17 Gram(s) Oral daily  potassium chloride   Powder 40 milliEquivalent(s) Oral every 4 hours  rotigotine patch 4 mG/24 Hr(s) 1 Patch Transdermal every 24 hours  sertraline 100 milliGRAM(s) Oral daily    MEDICATIONS  (PRN):  acetaminophen     Tablet .. 650 milliGRAM(s) Oral every 6 hours PRN Temp greater or equal to 38C (100.4F), Moderate Pain (4 - 6)         Vitals log        ICU Vital Signs Last 24 Hrs  T(C): 39.1 (06 Sep 2022 20:08), Max: 39.6 (06 Sep 2022 11:36)  T(F): 102.3 (06 Sep 2022 20:08), Max: 103.3 (06 Sep 2022 11:36)  HR: 106 (06 Sep 2022 20:08) (98 - 106)  BP: 145/71 (06 Sep 2022 20:08) (105/62 - 145/71)  BP(mean): --  ABP: --  ABP(mean): --  RR: 17 (06 Sep 2022 20:08) (17 - 18)  SpO2: 92% (06 Sep 2022 20:08) (92% - 93%)    O2 Parameters below as of 06 Sep 2022 20:08  Patient On (Oxygen Delivery Method): nasal cannula                 Input and Output:  I&O's Detail    05 Sep 2022 07:01  -  06 Sep 2022 07:00  --------------------------------------------------------  IN:    Free Water: 150 mL    IV PiggyBack: 50 mL    Jevity 1.5: 270 mL  Total IN: 470 mL    OUT:    Indwelling Catheter - Urethral (mL): 1250 mL  Total OUT: 1250 mL    Total NET: -780 mL      06 Sep 2022 07:01  -  07 Sep 2022 05:39  --------------------------------------------------------  IN:  Total IN: 0 mL    OUT:    Indwelling Catheter - Urethral (mL): 800 mL  Total OUT: 800 mL    Total NET: -800 mL          Lab Data                        8.1    14.02 )-----------( 294      ( 06 Sep 2022 00:07 )             25.6     09-06    145  |  111<H>  |  47<H>  ----------------------------<  190<H>  3.0<L>   |  25  |  0.56    Ca    8.8      06 Sep 2022 06:58  Phos  4.1     09-05  Mg     2.3     09-06    TPro  6.8  /  Alb  2.2<L>  /  TBili  0.5  /  DBili  x   /  AST  40<H>  /  ALT  14  /  AlkPhos  193<H>  -            Review of Systems	      Objective     Physical Examination    heart s1s2  lung dec BS  abd soft      Pertinent Lab findings & Imaging      Yoel:  NO   Adequate UO     I&O's Detail    05 Sep 2022 07:01  -  06 Sep 2022 07:00  --------------------------------------------------------  IN:    Free Water: 150 mL    IV PiggyBack: 50 mL    Jevity 1.5: 270 mL  Total IN: 470 mL    OUT:    Indwelling Catheter - Urethral (mL): 1250 mL  Total OUT: 1250 mL    Total NET: -780 mL      06 Sep 2022 07:01  -  07 Sep 2022 05:39  --------------------------------------------------------  IN:  Total IN: 0 mL    OUT:    Indwelling Catheter - Urethral (mL): 800 mL  Total OUT: 800 mL    Total NET: -800 mL               Discussed with:     Cultures:	        Radiology

## 2022-09-07 NOTE — PHYSICAL THERAPY INITIAL EVALUATION ADULT - PERTINENT HX OF CURRENT PROBLEM, REHAB EVAL
72[facility].   Patient is a terminal case of PD.   At ED arrival her O2 sat was 72%.  Patient DNR/DNI started on 100% NRBM and her saturation improved.   Patient is non communicative most of informations received from [facility] facility and from spouse.  Patient has PEG/JT and on feeding and Parkinson's Disease medications.

## 2022-09-07 NOTE — PROGRESS NOTE ADULT - ASSESSMENT
72[facility].    PD  Hypoxemia  Effusion  elev D dimer  Frail  Weak  Elderly  OP  OA    VQ scan - unable to cooperate  on Lovenox full dose  LE doppler NEG  febrile overnight, ID follow up - vs noted -   ABX adjustment noted - On NILSON now      ID eval in progress - Blood cx noted  labs and imaging reviewed  elev D dimer - contrast allergy cannot have CTA  HOB elev  asp prec  oral hygiene  pt has a PEG  pt has end stage Parkinson's disease - assist with needs - ADL - Fall prec - GOC - Pt is DNR  monitor VS and Sat  o2 support as needed - keep sat > 88 pct  prognosis guarded

## 2022-09-08 LAB
ALBUMIN SERPL ELPH-MCNC: 2 G/DL — LOW (ref 3.3–5)
ALP SERPL-CCNC: 135 U/L — HIGH (ref 40–120)
ALT FLD-CCNC: 19 U/L — SIGNIFICANT CHANGE UP (ref 12–78)
ANION GAP SERPL CALC-SCNC: 5 MMOL/L — SIGNIFICANT CHANGE UP (ref 5–17)
AST SERPL-CCNC: 63 U/L — HIGH (ref 15–37)
BASOPHILS # BLD AUTO: 0.07 K/UL — SIGNIFICANT CHANGE UP (ref 0–0.2)
BASOPHILS NFR BLD AUTO: 0.4 % — SIGNIFICANT CHANGE UP (ref 0–2)
BILIRUB SERPL-MCNC: 0.2 MG/DL — SIGNIFICANT CHANGE UP (ref 0.2–1.2)
BUN SERPL-MCNC: 35 MG/DL — HIGH (ref 7–23)
CALCIUM SERPL-MCNC: 8.7 MG/DL — SIGNIFICANT CHANGE UP (ref 8.5–10.1)
CHLORIDE SERPL-SCNC: 118 MMOL/L — HIGH (ref 96–108)
CO2 SERPL-SCNC: 29 MMOL/L — SIGNIFICANT CHANGE UP (ref 22–31)
CREAT SERPL-MCNC: 0.52 MG/DL — SIGNIFICANT CHANGE UP (ref 0.5–1.3)
EGFR: 97 ML/MIN/1.73M2 — SIGNIFICANT CHANGE UP
EOSINOPHIL # BLD AUTO: 0.7 K/UL — HIGH (ref 0–0.5)
EOSINOPHIL NFR BLD AUTO: 3.5 % — SIGNIFICANT CHANGE UP (ref 0–6)
GLUCOSE SERPL-MCNC: 145 MG/DL — HIGH (ref 70–99)
HCT VFR BLD CALC: 31.4 % — LOW (ref 34.5–45)
HGB BLD-MCNC: 9.5 G/DL — LOW (ref 11.5–15.5)
IMM GRANULOCYTES NFR BLD AUTO: 4.4 % — HIGH (ref 0–1.5)
LYMPHOCYTES # BLD AUTO: 13.6 % — SIGNIFICANT CHANGE UP (ref 13–44)
LYMPHOCYTES # BLD AUTO: 2.69 K/UL — SIGNIFICANT CHANGE UP (ref 1–3.3)
MAGNESIUM SERPL-MCNC: 2.1 MG/DL — SIGNIFICANT CHANGE UP (ref 1.6–2.6)
MCHC RBC-ENTMCNC: 26.8 PG — LOW (ref 27–34)
MCHC RBC-ENTMCNC: 30.3 GM/DL — LOW (ref 32–36)
MCV RBC AUTO: 88.7 FL — SIGNIFICANT CHANGE UP (ref 80–100)
MONOCYTES # BLD AUTO: 1.2 K/UL — HIGH (ref 0–0.9)
MONOCYTES NFR BLD AUTO: 6.1 % — SIGNIFICANT CHANGE UP (ref 2–14)
NEUTROPHILS # BLD AUTO: 14.21 K/UL — HIGH (ref 1.8–7.4)
NEUTROPHILS NFR BLD AUTO: 72 % — SIGNIFICANT CHANGE UP (ref 43–77)
NRBC # BLD: 0 /100 WBCS — SIGNIFICANT CHANGE UP (ref 0–0)
PHOSPHATE SERPL-MCNC: 2.2 MG/DL — LOW (ref 2.5–4.5)
PLATELET # BLD AUTO: 475 K/UL — HIGH (ref 150–400)
POTASSIUM SERPL-MCNC: 4.6 MMOL/L — SIGNIFICANT CHANGE UP (ref 3.5–5.3)
POTASSIUM SERPL-SCNC: 4.6 MMOL/L — SIGNIFICANT CHANGE UP (ref 3.5–5.3)
PROT SERPL-MCNC: 6.8 G/DL — SIGNIFICANT CHANGE UP (ref 6–8.3)
RBC # BLD: 3.54 M/UL — LOW (ref 3.8–5.2)
RBC # FLD: 19.1 % — HIGH (ref 10.3–14.5)
SODIUM SERPL-SCNC: 152 MMOL/L — HIGH (ref 135–145)
WBC # BLD: 19.73 K/UL — HIGH (ref 3.8–10.5)
WBC # FLD AUTO: 19.73 K/UL — HIGH (ref 3.8–10.5)

## 2022-09-08 RX ORDER — IBUPROFEN 200 MG
400 TABLET ORAL ONCE
Refills: 0 | Status: COMPLETED | OUTPATIENT
Start: 2022-09-08 | End: 2022-09-08

## 2022-09-08 RX ORDER — IBUPROFEN 200 MG
600 TABLET ORAL ONCE
Refills: 0 | Status: COMPLETED | OUTPATIENT
Start: 2022-09-08 | End: 2022-09-08

## 2022-09-08 RX ADMIN — MIRTAZAPINE 45 MILLIGRAM(S): 45 TABLET, ORALLY DISINTEGRATING ORAL at 22:47

## 2022-09-08 RX ADMIN — Medication 400 MILLIGRAM(S): at 14:30

## 2022-09-08 RX ADMIN — Medication 2.5 MILLIGRAM(S): at 22:47

## 2022-09-08 RX ADMIN — FAMOTIDINE 20 MILLIGRAM(S): 10 INJECTION INTRAVENOUS at 11:27

## 2022-09-08 RX ADMIN — Medication 650 MILLIGRAM(S): at 12:10

## 2022-09-08 RX ADMIN — ROTIGOTINE 1 PATCH: 8 PATCH, EXTENDED RELEASE TRANSDERMAL at 10:42

## 2022-09-08 RX ADMIN — MIDODRINE HYDROCHLORIDE 5 MILLIGRAM(S): 2.5 TABLET ORAL at 17:17

## 2022-09-08 RX ADMIN — MIDODRINE HYDROCHLORIDE 5 MILLIGRAM(S): 2.5 TABLET ORAL at 05:26

## 2022-09-08 RX ADMIN — ENOXAPARIN SODIUM 70 MILLIGRAM(S): 100 INJECTION SUBCUTANEOUS at 17:17

## 2022-09-08 RX ADMIN — ENOXAPARIN SODIUM 70 MILLIGRAM(S): 100 INJECTION SUBCUTANEOUS at 05:26

## 2022-09-08 RX ADMIN — ERTAPENEM SODIUM 120 MILLIGRAM(S): 1 INJECTION, POWDER, LYOPHILIZED, FOR SOLUTION INTRAMUSCULAR; INTRAVENOUS at 16:20

## 2022-09-08 RX ADMIN — PREGABALIN 1000 MICROGRAM(S): 225 CAPSULE ORAL at 11:28

## 2022-09-08 RX ADMIN — Medication 2.5 MILLIGRAM(S): at 05:25

## 2022-09-08 RX ADMIN — SERTRALINE 100 MILLIGRAM(S): 25 TABLET, FILM COATED ORAL at 11:29

## 2022-09-08 RX ADMIN — MIDODRINE HYDROCHLORIDE 5 MILLIGRAM(S): 2.5 TABLET ORAL at 11:27

## 2022-09-08 RX ADMIN — Medication 650 MILLIGRAM(S): at 12:43

## 2022-09-08 RX ADMIN — ROTIGOTINE 1 PATCH: 8 PATCH, EXTENDED RELEASE TRANSDERMAL at 07:21

## 2022-09-08 RX ADMIN — Medication 600 MILLIGRAM(S): at 01:51

## 2022-09-08 RX ADMIN — POLYETHYLENE GLYCOL 3350 17 GRAM(S): 17 POWDER, FOR SOLUTION ORAL at 11:27

## 2022-09-08 RX ADMIN — ROTIGOTINE 1 PATCH: 8 PATCH, EXTENDED RELEASE TRANSDERMAL at 11:26

## 2022-09-08 RX ADMIN — Medication 2.5 MILLIGRAM(S): at 16:21

## 2022-09-08 RX ADMIN — Medication 1 MILLIGRAM(S): at 11:27

## 2022-09-08 RX ADMIN — Medication 400 MILLIGRAM(S): at 13:54

## 2022-09-08 RX ADMIN — Medication 600 MILLIGRAM(S): at 00:57

## 2022-09-08 NOTE — PROGRESS NOTE ADULT - SUBJECTIVE AND OBJECTIVE BOX
[facility] DIVISION of INFECTIOUS DISEASE  Cash Saini MD PhD, Mya Romero MD, Deann Zamarripa MD, Baldev Haynes MD, Chong Johns MD  and providing coverage with Lucinda Reynoso MD  Providing Infectious Disease Consultations at [facility], [facility], [facility]'s    Office# [phone] to schedule follow up appointments  Answering Service for urgent calls or New Consults [phone]  Cell# to text for urgent issues Cash Saini [phone]     infectious diseases progress note:    MATEO LESLIE is a 72y y. o. Female patient    Overnight and events of the last 24hrs reviewed    Allergies    Ceclor (Rash)  IV Contrast (Hypotension)  latex (Rash; Urticaria)    Intolerances        ANTIBIOTICS/RELEVANT:  antimicrobials  ertapenem  IVPB 1000 milliGRAM(s) IV Intermittent every 24 hours    immunologic:  influenza  Vaccine (HIGH DOSE) 0.7 milliLiter(s) IntraMuscular once    OTHER:  acetaminophen     Tablet .. 650 milliGRAM(s) Oral every 6 hours PRN  baclofen 2.5 milliGRAM(s) Oral every 8 hours  cyanocobalamin 1000 MICROGram(s) Oral daily  Duopa Enteral Suspension 1 Bottle,carbidopa 4.63mg/levodopa 20mg per mL 72 mL/Day 72 mL/Day Enteral Tube Continuous Pump  enoxaparin Injectable 70 milliGRAM(s) SubCutaneous every 12 hours  famotidine    Tablet 20 milliGRAM(s) Oral daily  folic acid 1 milliGRAM(s) Oral daily  midodrine. 5 milliGRAM(s) Oral three times a day  mirtazapine 45 milliGRAM(s) Oral at bedtime  polyethylene glycol 3350 17 Gram(s) Oral daily  rotigotine patch 4 mG/24 Hr(s) 1 Patch Transdermal every 24 hours  sertraline 100 milliGRAM(s) Oral daily      Objective:  Vital Signs Last 24 Hrs  T(C): 38.6 (08 Sep 2022 13:21), Max: 38.9 (08 Sep 2022 12:06)  T(F): 101.5 (08 Sep 2022 13:21), Max: 102 (08 Sep 2022 12:06)  HR: 101 (08 Sep 2022 12:06) (58 - 101)  BP: 106/62 (08 Sep 2022 12:06) (105/72 - 107/74)  BP(mean): --  RR: 17 (08 Sep 2022 12:06) (17 - 17)  SpO2: 91% (08 Sep 2022 12:06) (91% - 95%)    Parameters below as of 08 Sep 2022 12:06  Patient On (Oxygen Delivery Method): nasal cannula        T(C): 38.6 (09-08-22 @ 13:21), Max: 39.4 (09-06-22 @ 14:30)  T(C): 38.6 (09-08-22 @ 13:21), Max: 39.6 (09-06-22 @ 11:36)  T(C): 38.6 (09-08-22 @ 13:21), Max: 39.6 (09-06-22 @ 11:36)    PHYSICAL EXAM:  HEENT: NC atraumatic  Neck: supple  Respiratory: no accessory muscle use, breathing comfortably  Cardiovascular: distant  Gastrointestinal: normal appearing, nondistended  Extremities: no clubbing, no cyanosis,        LABS:                          9.5    19.73 )-----------( 475      ( 08 Sep 2022 06:40 )             31.4       WBC  19.73 09-08 @ 06:40  17.45 09-07 @ 06:47  14.02 09-06 @ 00:07  10.86 09-05 @ 06:51  10.42 09-04 @ 06:24  10.65 09-04 @ 01:03      09-08    152<H>  |  118<H>  |  35<H>  ----------------------------<  145<H>  4.6   |  29  |  0.52    Ca    8.7      08 Sep 2022 06:40  Phos  2.2     09-08  Mg     2.1     09-08    TPro  6.8  /  Alb  2.0<L>  /  TBili  0.2  /  DBili  x   /  AST  63<H>  /  ALT  19  /  AlkPhos  135<H>  09-08      Creatinine, Serum: 0.52 mg/dL (09-08-22 @ 06:40)  Creatinine, Serum: 0.50 mg/dL (09-07-22 @ 06:47)  Creatinine, Serum: 0.56 mg/dL (09-06-22 @ 06:58)  Creatinine, Serum: 0.65 mg/dL (09-05-22 @ 06:51)  Creatinine, Serum: 1.10 mg/dL (09-04-22 @ 06:24)  Creatinine, Serum: 1.50 mg/dL (09-04-22 @ 01:03)                INFLAMMATORY MARKERS  Auto Neutrophil #: 14.21 K/uL (09-08-22 @ 06:40)  Auto Lymphocyte #: 2.69 K/uL (09-08-22 @ 06:40)  Auto Neutrophil #: 12.51 K/uL (09-07-22 @ 06:47)  Auto Lymphocyte #: 2.29 K/uL (09-07-22 @ 06:47)  Auto Neutrophil #: 8.77 K/uL (09-05-22 @ 06:51)  Auto Lymphocyte #: 1.21 K/uL (09-05-22 @ 06:51)  Auto Neutrophil #: 8.59 K/uL (09-04-22 @ 06:24)  Auto Lymphocyte #: 1.16 K/uL (09-04-22 @ 06:24)  Auto Lymphocyte #: 1.49 K/uL (09-04-22 @ 01:03)  Auto Neutrophil #: 8.63 K/uL (09-04-22 @ 01:03)    Lactate, Blood: 1.9 mmol/L (09-04-22 @ 06:24)  Lactate, Blood: 2.7 mmol/L (09-04-22 @ 02:51)  Lactate, Blood: 2.8 mmol/L (09-04-22 @ 01:03)    Auto Eosinophil #: 0.70 K/uL (09-08-22 @ 06:40)  Auto Eosinophil #: 0.04 K/uL (09-07-22 @ 06:47)  Auto Eosinophil #: 0.13 K/uL (09-05-22 @ 06:51)  Auto Eosinophil #: 0.04 K/uL (09-04-22 @ 06:24)  Auto Eosinophil #: 0.00 K/uL (09-04-22 @ 01:03)                  Ferritin, Serum: 419 ng/mL (09-04-22 @ 02:51)        Activated Partial Thromboplastin Time: 26.9 sec (09-04-22 @ 01:03)  INR: 1.09 ratio (09-04-22 @ 01:03)    D-Dimer Assay, Quantitative: 1000 ng/mL DDU (09-06-22 @ 06:58)  D-Dimer Assay, Quantitative: 1731 ng/mL DDU (09-04-22 @ 08:42)        MICROBIOLOGY:              RADIOLOGY & ADDITIONAL STUDIES:

## 2022-09-08 NOTE — PROGRESS NOTE ADULT - ASSESSMENT
[facility] Infectious Diseases  Pt well know to our group from care at [facility]     72y Female transferred from [facility] facility of lethargy and hypoxia.   Patient is a terminal case of PD.   At ED arrival her O2 sat was 72%.  Patient DNR/DNI started on 100% NRBM and her saturation improved.   Patient is non communicative most of informations received from [facility] facility and from spouse.  Patient has PEG/JT and on feeding and Parkinson's Disease medications.  Blood culture (collected 9/4 1am)    -  ESBL: Detec    -  Proteus species: Detec    -  CTX-M Resistance Marker: Detec    RECOMMENDATIONS  PT with acute respiratory distress and on imaging suggestion of LLL PNA. And now noted to have ESBL Proteus bacteremia  recommended meropenem first day 9/5 9/7 changed to ertapenem but noted elevated wbc but clinically appears slightly improved  -continue ertapenem    repeat blood cultures collected 9/6-NGTD  -will order rpt blood cultures to document clearance and inform duration recommendations  -ESBL proteus in urine so potential source as acute pyelonephritis with bacteremia    All interventions in keeping with goals of care     Thank you for consulting us and involving us in the management of this most interesting and challenging case.  We will follow along in the care of this patient. Please call us at [phone] or text me directly on my cell# at [phone] with any concerns.

## 2022-09-08 NOTE — PROGRESS NOTE ADULT - ASSESSMENT
MATEO LESLIE is a 72y Female transferred from [facility] facility of lethargy and hypoxia.   Patient is a terminal case of PD.   9/6/22 patient has fever, Blood and urine cultures in progress.  ID and pulmonary on the case

## 2022-09-08 NOTE — PROGRESS NOTE ADULT - ASSESSMENT
72[facility]    PD  Hypoxemia  Effusion  elev D dimer  Frail  Weak  Elderly  OP  OA    VQ scan - unable to cooperate  on Lovenox full dose  LE doppler NEG  febrile overnight, ID follow up - vs noted -   ABX adjustment noted - On Invanz now      ID eval in progress - Blood cx noted  labs and imaging reviewed  elev D dimer - contrast allergy cannot have CTA  HOB elev  asp prec  oral hygiene  pt has a PEG  pt has end stage Parkinson's disease - assist with needs - ADL - Fall prec - GOC - Pt is DNR  monitor VS and Sat  o2 support as needed - keep sat > 88 pct  prognosis guarded

## 2022-09-08 NOTE — PROGRESS NOTE ADULT - SUBJECTIVE AND OBJECTIVE BOX
Date/Time Patient Seen:  		  Referring MD:   Data Reviewed	       Patient is a 72y old  Female who presents with a chief complaint of Lethargic and hypoxic (08 Sep 2022 05:29)      Subjective/HPI     PAST MEDICAL & SURGICAL HISTORY:  Constipation    Unilateral primary osteoarthritis, left hip    Parkinson&#x27;s disease    Basal cell carcinoma  scalp    GERD (gastroesophageal reflux disease)    Seasonal allergies    Depression    Acute respiratory failure with hypoxia    S/P deep brain stimulator placement  2006, 2 brain pacemakers Model #42537, batteries replaced 2014    History of   10/1982    H/O ovarian cystectomy  right,     H/O colonoscopy  , 2008 polypectomy, ,     Basal cell carcinoma  removal, mid vertex scalp, 2002    Abdominal hernia  with mesh, 2003          Medication list         MEDICATIONS  (STANDING):  baclofen 2.5 milliGRAM(s) Oral every 8 hours  cyanocobalamin 1000 MICROGram(s) Oral daily  Duopa Enteral Suspension 1 Bottle,carbidopa 4.63mg/levodopa 20mg per mL 72 mL/Day 72 mL/Day Enteral Tube Continuous Pump  enoxaparin Injectable 70 milliGRAM(s) SubCutaneous every 12 hours  ertapenem  IVPB 1000 milliGRAM(s) IV Intermittent every 24 hours  famotidine    Tablet 20 milliGRAM(s) Oral daily  folic acid 1 milliGRAM(s) Oral daily  influenza  Vaccine (HIGH DOSE) 0.7 milliLiter(s) IntraMuscular once  midodrine. 5 milliGRAM(s) Oral three times a day  mirtazapine 45 milliGRAM(s) Oral at bedtime  polyethylene glycol 3350 17 Gram(s) Oral daily  rotigotine patch 4 mG/24 Hr(s) 1 Patch Transdermal every 24 hours  sertraline 100 milliGRAM(s) Oral daily    MEDICATIONS  (PRN):  acetaminophen     Tablet .. 650 milliGRAM(s) Oral every 6 hours PRN Temp greater or equal to 38C (100.4F), Moderate Pain (4 - 6)         Vitals log        ICU Vital Signs Last 24 Hrs  T(C): 36.7 (08 Sep 2022 05:24), Max: 38.4 (07 Sep 2022 21:18)  T(F): 98 (08 Sep 2022 05:24), Max: 101.2 (07 Sep 2022 21:18)  HR: 58 (08 Sep 2022 05:24) (58 - 100)  BP: 105/72 (08 Sep 2022 05:24) (102/64 - 107/74)  BP(mean): --  ABP: --  ABP(mean): --  RR: 17 (08 Sep 2022 05:24) (17 - 19)  SpO2: 95% (08 Sep 2022 05:24) (93% - 95%)    O2 Parameters below as of 07 Sep 2022 21:18  Patient On (Oxygen Delivery Method): room air                 Input and Output:  I&O's Detail    06 Sep 2022 07:01  -  07 Sep 2022 07:00  --------------------------------------------------------  IN:  Total IN: 0 mL    OUT:    Indwelling Catheter - Urethral (mL): 1400 mL  Total OUT: 1400 mL    Total NET: -1400 mL      07 Sep 2022 07:01  -  08 Sep 2022 05:32  --------------------------------------------------------  IN:  Total IN: 0 mL    OUT:    Indwelling Catheter - Urethral (mL): 850 mL  Total OUT: 850 mL    Total NET: -850 mL          Lab Data                        8.7    17.45 )-----------( 443      ( 07 Sep 2022 06:47 )             28.2         152<H>  |  117<H>  |  31<H>  ----------------------------<  180<H>  3.9   |  30  |  0.50    Ca    8.4<L>      07 Sep 2022 06:47  Mg     2.0         TPro  6.8  /  Alb  2.2<L>  /  TBili  0.5  /  DBili  x   /  AST  40<H>  /  ALT  14  /  AlkPhos  193<H>  -            Review of Systems	      Objective     Physical Examination    heart s1s2  lung dc BS  abd soft      Pertinent Lab findings & Imaging      Yoel:  NO   Adequate UO     I&O's Detail    06 Sep 2022 07:01  -  07 Sep 2022 07:00  --------------------------------------------------------  IN:  Total IN: 0 mL    OUT:    Indwelling Catheter - Urethral (mL): 1400 mL  Total OUT: 1400 mL    Total NET: -1400 mL      07 Sep 2022 07:01  -  08 Sep 2022 05:32  --------------------------------------------------------  IN:  Total IN: 0 mL    OUT:    Indwelling Catheter - Urethral (mL): 850 mL  Total OUT: 850 mL    Total NET: -850 mL               Discussed with:     Cultures:	        Radiology

## 2022-09-08 NOTE — PROGRESS NOTE ADULT - SUBJECTIVE AND OBJECTIVE BOX
Patient is a 72y old  Female who presents with a chief complaint of Lethargic and hypoxic (06 Sep 2022 05:52)  9/8/22: Patient still had fever > 101    INTERVAL OVER NIGHT EVENTS:    MEDICATIONS  (STANDING):  baclofen 2.5 milliGRAM(s) Oral every 8 hours  cyanocobalamin 1000 MICROGram(s) Oral daily  Duopa Enteral Suspension 1 Bottle,carbidopa 4.63mg/levodopa 20mg per mL 72 mL/Day 72 mL/Day Enteral Tube Continuous Pump  enoxaparin Injectable 70 milliGRAM(s) SubCutaneous every 12 hours  ertapenem  IVPB 1000 milliGRAM(s) IV Intermittent every 24 hours  famotidine    Tablet 20 milliGRAM(s) Oral daily  folic acid 1 milliGRAM(s) Oral daily  influenza  Vaccine (HIGH DOSE) 0.7 milliLiter(s) IntraMuscular once  midodrine. 5 milliGRAM(s) Oral three times a day  mirtazapine 45 milliGRAM(s) Oral at bedtime  polyethylene glycol 3350 17 Gram(s) Oral daily  rotigotine patch 4 mG/24 Hr(s) 1 Patch Transdermal every 24 hours  sertraline 100 milliGRAM(s) Oral daily    MEDICATIONS  (PRN):  acetaminophen     Tablet .. 650 milliGRAM(s) Oral every 6 hours PRN Temp greater or equal to 38C (100.4F), Moderate Pain (4 - 6)    Allergies    Ceclor (Rash)  IV Contrast (Hypotension)  latex (Rash; Urticaria)    Intolerances    REVIEW OF SYSTEMS:  Non verbal has fever.    Vital Signs Last 24 Hrs  T(C): 38.4 (07 Sep 2022 21:18), Max: 38.8 (07 Sep 2022 04:49)  T(F): 101.2 (07 Sep 2022 21:18), Max: 101.8 (07 Sep 2022 04:49)  HR: 100 (07 Sep 2022 21:18) (94 - 100)  BP: 107/74 (07 Sep 2022 21:18) (102/64 - 117/72)  BP(mean): --  RR: 17 (07 Sep 2022 21:18) (17 - 19)  SpO2: 93% (07 Sep 2022 21:18) (93% - 94%)    Parameters below as of 07 Sep 2022 21:18  Patient On (Oxygen Delivery Method): room air    PHYSICAL EXAM:  GENERAL: Non verbal  HEAD:  Atraumatic, Normocephalic  EYES: PERRLA, conjunctiva and sclera clear  ENMT:  Moist mucous membranes, Good dentition, No lesions  NECK: Supple, No JVD, Normal thyroid  NERVOUS SYSTEM:   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  CHEST/LUNG: Clear to percussion bilaterally; No rales, rhonchi, wheezing, or rubs  HEART: Regular rate and rhythm; No murmurs, rubs, or gallops  ABDOMEN: Soft, Nondistended; Bowel sounds present.  GT in place  EXTREMITIES:  2+ Peripheral Pulses, No clubbing, cyanosis, or edema  LYMPH: No lymphadenopathy noted  SKIN: No rashes or lesions    LABS:                        8.7    17.45 )-----------( 443      ( 07 Sep 2022 06:47 )             28.2     07 Sep 2022 06:47    152    |  117    |  31     ----------------------------<  180    3.9     |  30     |  0.50     Ca    8.4        07 Sep 2022 06:47  Mg     2.0       07 Sep 2022 06:47    TPro  6.8    /  Alb  2.2    /  TBili  0.5    /  DBili  x      /  AST  40     /  ALT  14     /  AlkPhos  193    06 Sep 2022 06:58    LIVER FUNCTIONS - ( 06 Sep 2022 06:58 )  Alb: 2.2 g/dL / Pro: 6.8 g/dL / ALK PHOS: 193 U/L / ALT: 14 U/L / AST: 40 U/L / GGT: x             CAPILLARY BLOOD GLUCOSE                                  8.1    14.02 )-----------( 294      ( 06 Sep 2022 00:07 )             25.6     09-05    140  |  105  |  57<H>  ----------------------------<  85  3.3<L>   |  25  |  0.65    Ca    8.8      05 Sep 2022 06:51  Phos  4.1     09-05  Mg     2.3     09-05    TPro  6.6  /  Alb  2.0<L>  /  TBili  0.6  /  DBili  x   /  AST  28  /  ALT  11<L>  /  AlkPhos  185<H>  09-05        CAPILLARY BLOOD GLUCOSE          RADIOLOGY & ADDITIONAL TESTS:    Imaging Personally Reviewed:  [x] YES  [ ] NO    Consultant(s) Notes Reviewed:  [x] YES  [ ] NO    Care Discussed with Consultants/Other Providers [x] YES  [ ] NO   Patient is a 72y old  Female who presents with a chief complaint of Lethargic and hypoxic (06 Sep 2022 05:52)  9/8/22: Patient still had fever > 101    INTERVAL OVER NIGHT EVENTS:    MEDICATIONS  (STANDING):  baclofen 2.5 milliGRAM(s) Oral every 8 hours  cyanocobalamin 1000 MICROGram(s) Oral daily  Duopa Enteral Suspension 1 Bottle,carbidopa 4.63mg/levodopa 20mg per mL 72 mL/Day 72 mL/Day Enteral Tube Continuous Pump  enoxaparin Injectable 70 milliGRAM(s) SubCutaneous every 12 hours  ertapenem  IVPB 1000 milliGRAM(s) IV Intermittent every 24 hours  famotidine    Tablet 20 milliGRAM(s) Oral daily  folic acid 1 milliGRAM(s) Oral daily  influenza  Vaccine (HIGH DOSE) 0.7 milliLiter(s) IntraMuscular once  midodrine. 5 milliGRAM(s) Oral three times a day  mirtazapine 45 milliGRAM(s) Oral at bedtime  polyethylene glycol 3350 17 Gram(s) Oral daily  rotigotine patch 4 mG/24 Hr(s) 1 Patch Transdermal every 24 hours  sertraline 100 milliGRAM(s) Oral daily    MEDICATIONS  (PRN):  acetaminophen     Tablet .. 650 milliGRAM(s) Oral every 6 hours PRN Temp greater or equal to 38C (100.4F), Moderate Pain (4 - 6)    Allergies    Ceclor (Rash)  IV Contrast (Hypotension)  latex (Rash; Urticaria)    Intolerances    REVIEW OF SYSTEMS:  Non verbal has fever.    Vital Signs Last 24 Hrs  T(C): 38.2 (08 Sep 2022 21:03), Max: 38.9 (08 Sep 2022 12:06)  T(F): 100.8 (08 Sep 2022 21:03), Max: 102 (08 Sep 2022 12:06)  HR: 94 (08 Sep 2022 21:03) (58 - 101)  BP: 96/56 (08 Sep 2022 21:03) (96/56 - 118/73)  BP(mean): --  RR: 18 (08 Sep 2022 21:03) (17 - 18)  SpO2: 87% (08 Sep 2022 21:03) (87% - 95%)    Parameters below as of 08 Sep 2022 21:03  Patient On (Oxygen Delivery Method): nasal cannula        PHYSICAL EXAM:  GENERAL: Non verbal  HEAD:  Atraumatic, Normocephalic  EYES: PERRLA, conjunctiva and sclera clear  ENMT:  Moist mucous membranes, Good dentition, No lesions  NECK: Supple, No JVD, Normal thyroid  NERVOUS SYSTEM:   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  CHEST/LUNG: Clear to percussion bilaterally; No rales, rhonchi, wheezing, or rubs  HEART: Regular rate and rhythm; No murmurs, rubs, or gallops  ABDOMEN: Soft, Nondistended; Bowel sounds present.  GT in place  EXTREMITIES:  2+ Peripheral Pulses, No clubbing, cyanosis, or edema  LYMPH: No lymphadenopathy noted  SKIN: No rashes or lesions    LABS:                        9.5    19.73 )-----------( 475      ( 08 Sep 2022 06:40 )             31.4     08 Sep 2022 06:40    152    |  118    |  35     ----------------------------<  145    4.6     |  29     |  0.52     Ca    8.7        08 Sep 2022 06:40  Phos  2.2       08 Sep 2022 06:40  Mg     2.1       08 Sep 2022 06:40    TPro  6.8    /  Alb  2.0    /  TBili  0.2    /  DBili  x      /  AST  63     /  ALT  19     /  AlkPhos  135    08 Sep 2022 06:40    LIVER FUNCTIONS - ( 08 Sep 2022 06:40 )  Alb: 2.0 g/dL / Pro: 6.8 g/dL / ALK PHOS: 135 U/L / ALT: 19 U/L / AST: 63 U/L / GGT: x             CAPILLARY BLOOD GLUCOSE                        8.7    17.45 )-----------( 443      ( 07 Sep 2022 06:47 )             28.2     07 Sep 2022 06:47    152    |  117    |  31     ----------------------------<  180    3.9     |  30     |  0.50     Ca    8.4        07 Sep 2022 06:47  Mg     2.0       07 Sep 2022 06:47    TPro  6.8    /  Alb  2.2    /  TBili  0.5    /  DBili  x      /  AST  40     /  ALT  14     /  AlkPhos  193    06 Sep 2022 06:58    LIVER FUNCTIONS - ( 06 Sep 2022 06:58 )  Alb: 2.2 g/dL / Pro: 6.8 g/dL / ALK PHOS: 193 U/L / ALT: 14 U/L / AST: 40 U/L / GGT: x             CAPILLARY BLOOD GLUCOSE                                  8.1    14.02 )-----------( 294      ( 06 Sep 2022 00:07 )             25.6     09-05    140  |  105  |  57<H>  ----------------------------<  85  3.3<L>   |  25  |  0.65    Ca    8.8      05 Sep 2022 06:51  Phos  4.1     09-05  Mg     2.3     09-05    TPro  6.6  /  Alb  2.0<L>  /  TBili  0.6  /  DBili  x   /  AST  28  /  ALT  11<L>  /  AlkPhos  185<H>  09-05        CAPILLARY BLOOD GLUCOSE          RADIOLOGY & ADDITIONAL TESTS:    Imaging Personally Reviewed:  [x] YES  [ ] NO    Consultant(s) Notes Reviewed:  [x] YES  [ ] NO    Care Discussed with Consultants/Other Providers [x] YES  [ ] NO

## 2022-09-09 LAB
ANION GAP SERPL CALC-SCNC: 5 MMOL/L — SIGNIFICANT CHANGE UP (ref 5–17)
BASOPHILS # BLD AUTO: 0.05 K/UL — SIGNIFICANT CHANGE UP (ref 0–0.2)
BASOPHILS NFR BLD AUTO: 0.3 % — SIGNIFICANT CHANGE UP (ref 0–2)
BUN SERPL-MCNC: 32 MG/DL — HIGH (ref 7–23)
CALCIUM SERPL-MCNC: 8.6 MG/DL — SIGNIFICANT CHANGE UP (ref 8.5–10.1)
CHLORIDE SERPL-SCNC: 120 MMOL/L — HIGH (ref 96–108)
CO2 SERPL-SCNC: 27 MMOL/L — SIGNIFICANT CHANGE UP (ref 22–31)
CREAT SERPL-MCNC: 0.33 MG/DL — LOW (ref 0.5–1.3)
EGFR: 109 ML/MIN/1.73M2 — SIGNIFICANT CHANGE UP
EOSINOPHIL # BLD AUTO: 0.62 K/UL — HIGH (ref 0–0.5)
EOSINOPHIL NFR BLD AUTO: 3.5 % — SIGNIFICANT CHANGE UP (ref 0–6)
GLUCOSE SERPL-MCNC: 132 MG/DL — HIGH (ref 70–99)
HCT VFR BLD CALC: 27.4 % — LOW (ref 34.5–45)
HGB BLD-MCNC: 8.5 G/DL — LOW (ref 11.5–15.5)
IMM GRANULOCYTES NFR BLD AUTO: 4.2 % — HIGH (ref 0–1.5)
LYMPHOCYTES # BLD AUTO: 14.8 % — SIGNIFICANT CHANGE UP (ref 13–44)
LYMPHOCYTES # BLD AUTO: 2.63 K/UL — SIGNIFICANT CHANGE UP (ref 1–3.3)
MAGNESIUM SERPL-MCNC: 2 MG/DL — SIGNIFICANT CHANGE UP (ref 1.6–2.6)
MCHC RBC-ENTMCNC: 27 PG — SIGNIFICANT CHANGE UP (ref 27–34)
MCHC RBC-ENTMCNC: 31 GM/DL — LOW (ref 32–36)
MCV RBC AUTO: 87 FL — SIGNIFICANT CHANGE UP (ref 80–100)
MONOCYTES # BLD AUTO: 0.67 K/UL — SIGNIFICANT CHANGE UP (ref 0–0.9)
MONOCYTES NFR BLD AUTO: 3.8 % — SIGNIFICANT CHANGE UP (ref 2–14)
NEUTROPHILS # BLD AUTO: 13.07 K/UL — HIGH (ref 1.8–7.4)
NEUTROPHILS NFR BLD AUTO: 73.4 % — SIGNIFICANT CHANGE UP (ref 43–77)
NRBC # BLD: 0 /100 WBCS — SIGNIFICANT CHANGE UP (ref 0–0)
PLATELET # BLD AUTO: 429 K/UL — HIGH (ref 150–400)
POTASSIUM SERPL-MCNC: 4.5 MMOL/L — SIGNIFICANT CHANGE UP (ref 3.5–5.3)
POTASSIUM SERPL-SCNC: 4.5 MMOL/L — SIGNIFICANT CHANGE UP (ref 3.5–5.3)
RBC # BLD: 3.15 M/UL — LOW (ref 3.8–5.2)
RBC # FLD: 18.6 % — HIGH (ref 10.3–14.5)
SODIUM SERPL-SCNC: 152 MMOL/L — HIGH (ref 135–145)
WBC # BLD: 17.78 K/UL — HIGH (ref 3.8–10.5)
WBC # FLD AUTO: 17.78 K/UL — HIGH (ref 3.8–10.5)

## 2022-09-09 RX ORDER — SODIUM CHLORIDE 9 MG/ML
250 INJECTION, SOLUTION INTRAVENOUS ONCE
Refills: 0 | Status: COMPLETED | OUTPATIENT
Start: 2022-09-09 | End: 2022-09-09

## 2022-09-09 RX ADMIN — ERTAPENEM SODIUM 120 MILLIGRAM(S): 1 INJECTION, POWDER, LYOPHILIZED, FOR SOLUTION INTRAMUSCULAR; INTRAVENOUS at 17:13

## 2022-09-09 RX ADMIN — Medication 2.5 MILLIGRAM(S): at 13:17

## 2022-09-09 RX ADMIN — Medication 1 MILLIGRAM(S): at 11:28

## 2022-09-09 RX ADMIN — Medication 2.5 MILLIGRAM(S): at 06:27

## 2022-09-09 RX ADMIN — SODIUM CHLORIDE 250 MILLILITER(S): 9 INJECTION, SOLUTION INTRAVENOUS at 03:14

## 2022-09-09 RX ADMIN — POLYETHYLENE GLYCOL 3350 17 GRAM(S): 17 POWDER, FOR SOLUTION ORAL at 11:28

## 2022-09-09 RX ADMIN — ROTIGOTINE 1 PATCH: 8 PATCH, EXTENDED RELEASE TRANSDERMAL at 10:45

## 2022-09-09 RX ADMIN — ROTIGOTINE 1 PATCH: 8 PATCH, EXTENDED RELEASE TRANSDERMAL at 21:55

## 2022-09-09 RX ADMIN — Medication 2.5 MILLIGRAM(S): at 21:59

## 2022-09-09 RX ADMIN — SERTRALINE 100 MILLIGRAM(S): 25 TABLET, FILM COATED ORAL at 11:28

## 2022-09-09 RX ADMIN — MIDODRINE HYDROCHLORIDE 5 MILLIGRAM(S): 2.5 TABLET ORAL at 17:13

## 2022-09-09 RX ADMIN — FAMOTIDINE 20 MILLIGRAM(S): 10 INJECTION INTRAVENOUS at 11:28

## 2022-09-09 RX ADMIN — ENOXAPARIN SODIUM 70 MILLIGRAM(S): 100 INJECTION SUBCUTANEOUS at 17:13

## 2022-09-09 RX ADMIN — MIDODRINE HYDROCHLORIDE 5 MILLIGRAM(S): 2.5 TABLET ORAL at 11:28

## 2022-09-09 RX ADMIN — ROTIGOTINE 1 PATCH: 8 PATCH, EXTENDED RELEASE TRANSDERMAL at 11:04

## 2022-09-09 RX ADMIN — PREGABALIN 1000 MICROGRAM(S): 225 CAPSULE ORAL at 11:28

## 2022-09-09 RX ADMIN — MIDODRINE HYDROCHLORIDE 5 MILLIGRAM(S): 2.5 TABLET ORAL at 06:28

## 2022-09-09 RX ADMIN — MIRTAZAPINE 45 MILLIGRAM(S): 45 TABLET, ORALLY DISINTEGRATING ORAL at 21:59

## 2022-09-09 RX ADMIN — ENOXAPARIN SODIUM 70 MILLIGRAM(S): 100 INJECTION SUBCUTANEOUS at 06:29

## 2022-09-09 NOTE — PROGRESS NOTE ADULT - SUBJECTIVE AND OBJECTIVE BOX
Patient is a 72y old  Female who presents with a chief complaint of Lethargic and hypoxic (06 Sep 2022 05:52)  9/8/22: Patient still had fever > 101    INTERVAL OVER NIGHT EVENTS:    MEDICATIONS  (STANDING):  baclofen 2.5 milliGRAM(s) Oral every 8 hours  cyanocobalamin 1000 MICROGram(s) Oral daily  Duopa Enteral Suspension 1 Bottle,carbidopa 4.63mg/levodopa 20mg per mL 72 mL/Day 72 mL/Day Enteral Tube Continuous Pump  enoxaparin Injectable 70 milliGRAM(s) SubCutaneous every 12 hours  ertapenem  IVPB 1000 milliGRAM(s) IV Intermittent every 24 hours  famotidine    Tablet 20 milliGRAM(s) Oral daily  folic acid 1 milliGRAM(s) Oral daily  influenza  Vaccine (HIGH DOSE) 0.7 milliLiter(s) IntraMuscular once  midodrine. 5 milliGRAM(s) Oral three times a day  mirtazapine 45 milliGRAM(s) Oral at bedtime  polyethylene glycol 3350 17 Gram(s) Oral daily  rotigotine patch 4 mG/24 Hr(s) 1 Patch Transdermal every 24 hours  sertraline 100 milliGRAM(s) Oral daily    MEDICATIONS  (PRN):  acetaminophen     Tablet .. 650 milliGRAM(s) Oral every 6 hours PRN Temp greater or equal to 38C (100.4F), Moderate Pain (4 - 6)    Allergies    Ceclor (Rash)  IV Contrast (Hypotension)  latex (Rash; Urticaria)    Intolerances    REVIEW OF SYSTEMS:  Non verbal has fever.    Vital Signs Last 24 Hrs  T(C): 37.1 (09 Sep 2022 04:45), Max: 38.9 (08 Sep 2022 12:06)  T(F): 98.8 (09 Sep 2022 04:45), Max: 102 (08 Sep 2022 12:06)  HR: 60 (09 Sep 2022 04:45) (60 - 101)  BP: 96/65 (09 Sep 2022 04:45) (82/58 - 118/73)  BP(mean): --  RR: 19 (09 Sep 2022 04:45) (17 - 19)  SpO2: 92% (09 Sep 2022 04:45) (87% - 92%)    Parameters below as of 09 Sep 2022 04:45  Patient On (Oxygen Delivery Method): nasal cannula            PHYSICAL EXAM:  GENERAL: Non verbal  HEAD:  Atraumatic, Normocephalic  EYES: PERRLA, conjunctiva and sclera clear  ENMT:  Moist mucous membranes, Good dentition, No lesions  NECK: Supple, No JVD, Normal thyroid  NERVOUS SYSTEM:   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  CHEST/LUNG: Clear to percussion bilaterally; No rales, rhonchi, wheezing, or rubs  HEART: Regular rate and rhythm; No murmurs, rubs, or gallops  ABDOMEN: Soft, Nondistended; Bowel sounds present.  GT in place  EXTREMITIES:  2+ Peripheral Pulses, No clubbing, cyanosis, or edema  LYMPH: No lymphadenopathy noted  SKIN: No rashes or lesions    LABS:                        9.5    19.73 )-----------( 475      ( 08 Sep 2022 06:40 )             31.4     08 Sep 2022 06:40    152    |  118    |  35     ----------------------------<  145    4.6     |  29     |  0.52     Ca    8.7        08 Sep 2022 06:40  Phos  2.2       08 Sep 2022 06:40  Mg     2.1       08 Sep 2022 06:40    TPro  6.8    /  Alb  2.0    /  TBili  0.2    /  DBili  x      /  AST  63     /  ALT  19     /  AlkPhos  135    08 Sep 2022 06:40    LIVER FUNCTIONS - ( 08 Sep 2022 06:40 )  Alb: 2.0 g/dL / Pro: 6.8 g/dL / ALK PHOS: 135 U/L / ALT: 19 U/L / AST: 63 U/L / GGT: x             CAPILLARY BLOOD GLUCOSE                        8.7    17.45 )-----------( 443      ( 07 Sep 2022 06:47 )             28.2     07 Sep 2022 06:47    152    |  117    |  31     ----------------------------<  180    3.9     |  30     |  0.50     Ca    8.4        07 Sep 2022 06:47  Mg     2.0       07 Sep 2022 06:47    TPro  6.8    /  Alb  2.2    /  TBili  0.5    /  DBili  x      /  AST  40     /  ALT  14     /  AlkPhos  193    06 Sep 2022 06:58    LIVER FUNCTIONS - ( 06 Sep 2022 06:58 )  Alb: 2.2 g/dL / Pro: 6.8 g/dL / ALK PHOS: 193 U/L / ALT: 14 U/L / AST: 40 U/L / GGT: x             CAPILLARY BLOOD GLUCOSE                                  8.1    14.02 )-----------( 294      ( 06 Sep 2022 00:07 )             25.6     09-05    140  |  105  |  57<H>  ----------------------------<  85  3.3<L>   |  25  |  0.65    Ca    8.8      05 Sep 2022 06:51  Phos  4.1     09-05  Mg     2.3     09-05    TPro  6.6  /  Alb  2.0<L>  /  TBili  0.6  /  DBili  x   /  AST  28  /  ALT  11<L>  /  AlkPhos  185<H>  09-05        CAPILLARY BLOOD GLUCOSE          RADIOLOGY & ADDITIONAL TESTS:    Imaging Personally Reviewed:  [x] YES  [ ] NO    Consultant(s) Notes Reviewed:  [x] YES  [ ] NO    Care Discussed with Consultants/Other Providers [x] YES  [ ] NO

## 2022-09-09 NOTE — PROGRESS NOTE ADULT - SUBJECTIVE AND OBJECTIVE BOX
[facility] DIVISION of INFECTIOUS DISEASE  Cash Saini MD PhD, Mya Romero MD, Deann Zamarripa MD, Baldev Haynes MD, Chong Johns MD  and providing coverage with Lucinda Reynoso MD  Providing Infectious Disease Consultations at [facility], [facility], [facility]'s    Office# [phone] to schedule follow up appointments  Answering Service for urgent calls or New Consults [phone]  Cell# to text for urgent issues Cash Saini [phone]     infectious diseases progress note:    MATEO LESLIE is a 72y y. o. Female patient    Overnight and events of the last 24hrs reviewed    Allergies    Ceclor (Rash)  IV Contrast (Hypotension)  latex (Rash; Urticaria)    Intolerances        ANTIBIOTICS/RELEVANT:  antimicrobials  ertapenem  IVPB 1000 milliGRAM(s) IV Intermittent every 24 hours    immunologic:  influenza  Vaccine (HIGH DOSE) 0.7 milliLiter(s) IntraMuscular once    OTHER:  acetaminophen     Tablet .. 650 milliGRAM(s) Oral every 6 hours PRN  baclofen 2.5 milliGRAM(s) Oral every 8 hours  cyanocobalamin 1000 MICROGram(s) Oral daily  Duopa Enteral Suspension 1 Bottle,carbidopa 4.63mg/levodopa 20mg per mL 72 mL/Day 72 mL/Day Enteral Tube Continuous Pump  enoxaparin Injectable 70 milliGRAM(s) SubCutaneous every 12 hours  famotidine    Tablet 20 milliGRAM(s) Oral daily  folic acid 1 milliGRAM(s) Oral daily  midodrine. 5 milliGRAM(s) Oral three times a day  mirtazapine 45 milliGRAM(s) Oral at bedtime  polyethylene glycol 3350 17 Gram(s) Oral daily  rotigotine patch 4 mG/24 Hr(s) 1 Patch Transdermal every 24 hours  sertraline 100 milliGRAM(s) Oral daily      Objective:  Vital Signs Last 24 Hrs  T(C): 37.1 (09 Sep 2022 04:45), Max: 38.9 (08 Sep 2022 12:06)  T(F): 98.8 (09 Sep 2022 04:45), Max: 102 (08 Sep 2022 12:06)  HR: 60 (09 Sep 2022 04:45) (60 - 101)  BP: 96/65 (09 Sep 2022 04:45) (82/58 - 118/73)  BP(mean): --  RR: 19 (09 Sep 2022 04:45) (17 - 19)  SpO2: 92% (09 Sep 2022 04:45) (87% - 92%)    Parameters below as of 09 Sep 2022 04:45  Patient On (Oxygen Delivery Method): nasal cannula        T(C): 37.1 (09-09-22 @ 04:45), Max: 38.9 (09-08-22 @ 12:06)  T(C): 37.1 (09-09-22 @ 04:45), Max: 39.6 (09-06-22 @ 11:36)  T(C): 37.1 (09-09-22 @ 04:45), Max: 39.6 (09-06-22 @ 11:36)    PHYSICAL EXAM:  HEENT: NC atraumatic  Neck: supple  Respiratory: no accessory muscle use, breathing comfortably  Cardiovascular: distant  Gastrointestinal: normal appearing, nondistended  Extremities: no clubbing, no cyanosis,        LABS:                          8.5    17.78 )-----------( 429      ( 09 Sep 2022 07:29 )             27.4       WBC  17.78 09-09 @ 07:29  19.73 09-08 @ 06:40  17.45 09-07 @ 06:47  14.02 09-06 @ 00:07  10.86 09-05 @ 06:51  10.42 09-04 @ 06:24  10.65 09-04 @ 01:03      09-09    152<H>  |  120<H>  |  32<H>  ----------------------------<  132<H>  4.5   |  27  |  0.33<L>    Ca    8.6      09 Sep 2022 07:29  Phos  2.2     09-08  Mg     2.0     09-09    TPro  6.8  /  Alb  2.0<L>  /  TBili  0.2  /  DBili  x   /  AST  63<H>  /  ALT  19  /  AlkPhos  135<H>  09-08      Creatinine, Serum: 0.33 mg/dL (09-09-22 @ 07:29)  Creatinine, Serum: 0.52 mg/dL (09-08-22 @ 06:40)  Creatinine, Serum: 0.50 mg/dL (09-07-22 @ 06:47)  Creatinine, Serum: 0.56 mg/dL (09-06-22 @ 06:58)  Creatinine, Serum: 0.65 mg/dL (09-05-22 @ 06:51)  Creatinine, Serum: 1.10 mg/dL (09-04-22 @ 06:24)  Creatinine, Serum: 1.50 mg/dL (09-04-22 @ 01:03)                INFLAMMATORY MARKERS  Auto Neutrophil #: 13.07 K/uL (09-09-22 @ 07:29)  Auto Lymphocyte #: 2.63 K/uL (09-09-22 @ 07:29)  Auto Neutrophil #: 14.21 K/uL (09-08-22 @ 06:40)  Auto Lymphocyte #: 2.69 K/uL (09-08-22 @ 06:40)  Auto Neutrophil #: 12.51 K/uL (09-07-22 @ 06:47)  Auto Lymphocyte #: 2.29 K/uL (09-07-22 @ 06:47)  Auto Neutrophil #: 8.77 K/uL (09-05-22 @ 06:51)  Auto Lymphocyte #: 1.21 K/uL (09-05-22 @ 06:51)  Auto Neutrophil #: 8.59 K/uL (09-04-22 @ 06:24)  Auto Lymphocyte #: 1.16 K/uL (09-04-22 @ 06:24)  Auto Lymphocyte #: 1.49 K/uL (09-04-22 @ 01:03)  Auto Neutrophil #: 8.63 K/uL (09-04-22 @ 01:03)    Lactate, Blood: 1.9 mmol/L (09-04-22 @ 06:24)  Lactate, Blood: 2.7 mmol/L (09-04-22 @ 02:51)  Lactate, Blood: 2.8 mmol/L (09-04-22 @ 01:03)    Auto Eosinophil #: 0.62 K/uL (09-09-22 @ 07:29)  Auto Eosinophil #: 0.70 K/uL (09-08-22 @ 06:40)  Auto Eosinophil #: 0.04 K/uL (09-07-22 @ 06:47)  Auto Eosinophil #: 0.13 K/uL (09-05-22 @ 06:51)  Auto Eosinophil #: 0.04 K/uL (09-04-22 @ 06:24)  Auto Eosinophil #: 0.00 K/uL (09-04-22 @ 01:03)      Ferritin, Serum: 419 ng/mL (09-04-22 @ 02:51)        Activated Partial Thromboplastin Time: 26.9 sec (09-04-22 @ 01:03)  INR: 1.09 ratio (09-04-22 @ 01:03)    D-Dimer Assay, Quantitative: 1000 ng/mL DDU (09-06-22 @ 06:58)  D-Dimer Assay, Quantitative: 1731 ng/mL DDU (09-04-22 @ 08:42)        MICROBIOLOGY:              RADIOLOGY & ADDITIONAL STUDIES:

## 2022-09-09 NOTE — PROGRESS NOTE ADULT - ASSESSMENT
[facility] Infectious [facility]  Pt well know to our group from care at [facility]     72y Female transferred from [facility] facility of lethargy and hypoxia.   Patient is a terminal case of PD.   At ED arrival her O2 sat was 72%.  Patient DNR/DNI started on 100% NRBM and her saturation improved.   Patient is non communicative most of informations received from [facility] facility and from spouse.  Patient has PEG/JT and on feeding and Parkinson's Disease medications.  Blood culture (collected 9/4 1am)    -  ESBL: Detec    -  Proteus species: Detec    -  CTX-M Resistance Marker: Detec    RECOMMENDATIONS  PT with acute respiratory distress and on imaging suggestion of LLL PNA. And now noted to have ESBL Proteus bacteremia  recommended meropenem first day 9/5 9/7 changed to ertapenem but noted elevated wbc but clinically appears slightly improved  -continue ertapenem    repeat blood cultures collected 9/6-NGTD  -will order rpt blood cultures to document clearance and inform duration recommendations  -ESBL proteus in urine so potential source as acute pyelonephritis with bacteremia    9/9-with continued leukocytosis, fevers recommend repeat abd/pelvic imaging concern for issue requiring site control    All interventions in keeping with goals of care     Thank you for consulting us and involving us in the management of this most interesting and challenging case.  We will follow along in the care of this patient. Please call us at [phone] or text me directly on my cell# at [phone] with any concerns.

## 2022-09-09 NOTE — PROGRESS NOTE ADULT - ASSESSMENT
72[facility]    PD  Hypoxemia  Effusion  elev D dimer  Frail  Weak  Elderly  OP  OA    VQ scan - unable to cooperate  on Lovenox full dose  LE doppler NEG  fever trend - vs noted - monitor WBC - ID follow up  - ABX adjustment noted - On Invanz now      ID eval in progress - Blood cx noted  labs and imaging reviewed  elev D dimer - contrast allergy cannot have CTA  HOB elev  asp prec  oral hygiene  pt has a PEG  pt has end stage Parkinson's disease - assist with needs - ADL - Fall prec - GOC - Pt is DNR  monitor VS and Sat  o2 support as needed - keep sat > 88 pct  prognosis guarded

## 2022-09-09 NOTE — PROGRESS NOTE ADULT - SUBJECTIVE AND OBJECTIVE BOX
Date/Time Patient Seen:  		  Referring MD:   Data Reviewed	       Patient is a 72y old  Female who presents with a chief complaint of Lethargic and hypoxic (09 Sep 2022 05:44)      Subjective/HPI     PAST MEDICAL & SURGICAL HISTORY:  Constipation    Unilateral primary osteoarthritis, left hip    Parkinson&#x27;s disease    Basal cell carcinoma  scalp    GERD (gastroesophageal reflux disease)    Seasonal allergies    Depression    Acute respiratory failure with hypoxia    S/P deep brain stimulator placement  2006, 2 brain pacemakers Model #60548, batteries replaced 2014    History of   10/1982    H/O ovarian cystectomy  right,     H/O colonoscopy  , 2008 polypectomy, ,     Basal cell carcinoma  removal, mid vertex scalp, 2002    Abdominal hernia  with mesh, 2003          Medication list         MEDICATIONS  (STANDING):  baclofen 2.5 milliGRAM(s) Oral every 8 hours  cyanocobalamin 1000 MICROGram(s) Oral daily  Duopa Enteral Suspension 1 Bottle,carbidopa 4.63mg/levodopa 20mg per mL 72 mL/Day 72 mL/Day Enteral Tube Continuous Pump  enoxaparin Injectable 70 milliGRAM(s) SubCutaneous every 12 hours  ertapenem  IVPB 1000 milliGRAM(s) IV Intermittent every 24 hours  famotidine    Tablet 20 milliGRAM(s) Oral daily  folic acid 1 milliGRAM(s) Oral daily  influenza  Vaccine (HIGH DOSE) 0.7 milliLiter(s) IntraMuscular once  midodrine. 5 milliGRAM(s) Oral three times a day  mirtazapine 45 milliGRAM(s) Oral at bedtime  polyethylene glycol 3350 17 Gram(s) Oral daily  rotigotine patch 4 mG/24 Hr(s) 1 Patch Transdermal every 24 hours  sertraline 100 milliGRAM(s) Oral daily    MEDICATIONS  (PRN):  acetaminophen     Tablet .. 650 milliGRAM(s) Oral every 6 hours PRN Temp greater or equal to 38C (100.4F), Moderate Pain (4 - 6)         Vitals log        ICU Vital Signs Last 24 Hrs  T(C): 37.1 (09 Sep 2022 04:45), Max: 38.9 (08 Sep 2022 12:06)  T(F): 98.8 (09 Sep 2022 04:45), Max: 102 (08 Sep 2022 12:06)  HR: 60 (09 Sep 2022 04:45) (60 - 101)  BP: 96/65 (09 Sep 2022 04:45) (82/58 - 118/73)  BP(mean): --  ABP: --  ABP(mean): --  RR: 19 (09 Sep 2022 04:45) (17 - 19)  SpO2: 92% (09 Sep 2022 04:45) (87% - 92%)    O2 Parameters below as of 09 Sep 2022 04:45  Patient On (Oxygen Delivery Method): nasal cannula                 Input and Output:  I&O's Detail    07 Sep 2022 07:01  -  08 Sep 2022 07:00  --------------------------------------------------------  IN:    Jevity 1.5: 675 mL  Total IN: 675 mL    OUT:    Indwelling Catheter - Urethral (mL): 850 mL  Total OUT: 850 mL    Total NET: -175 mL      08 Sep 2022 07:01  -  09 Sep 2022 06:04  --------------------------------------------------------  IN:  Total IN: 0 mL    OUT:    Indwelling Catheter - Urethral (mL): 1550 mL  Total OUT: 1550 mL    Total NET: -1550 mL          Lab Data                        9.5    19.73 )-----------( 475      ( 08 Sep 2022 06:40 )             31.4         152<H>  |  118<H>  |  35<H>  ----------------------------<  145<H>  4.6   |  29  |  0.52    Ca    8.7      08 Sep 2022 06:40  Phos  2.2       Mg     2.1         TPro  6.8  /  Alb  2.0<L>  /  TBili  0.2  /  DBili  x   /  AST  63<H>  /  ALT  19  /  AlkPhos  135<H>  -08            Review of Systems	      Objective     Physical Examination    heart s1s2  lung dec BS  abd soft      Pertinent Lab findings & Imaging      Yoel:  NO   Adequate UO     I&O's Detail    07 Sep 2022 07:01  -  08 Sep 2022 07:00  --------------------------------------------------------  IN:    Jevity 1.5: 675 mL  Total IN: 675 mL    OUT:    Indwelling Catheter - Urethral (mL): 850 mL  Total OUT: 850 mL    Total NET: -175 mL      08 Sep 2022 07:01  -  09 Sep 2022 06:04  --------------------------------------------------------  IN:  Total IN: 0 mL    OUT:    Indwelling Catheter - Urethral (mL): 1550 mL  Total OUT: 1550 mL    Total NET: -1550 mL               Discussed with:     Cultures:	        Radiology

## 2022-09-09 NOTE — PROGRESS NOTE ADULT - ASSESSMENT
MATEO LESLIE is a 72y Female transferred from [facility] facility of lethargy and hypoxia.   Patient is a terminal case of PD.   9/6/22 patient has fever, Blood and urine cultures in progress.  ID and pulmonary on the case

## 2022-09-10 LAB — SARS-COV-2 RNA SPEC QL NAA+PROBE: SIGNIFICANT CHANGE UP

## 2022-09-10 RX ADMIN — SERTRALINE 100 MILLIGRAM(S): 25 TABLET, FILM COATED ORAL at 13:30

## 2022-09-10 RX ADMIN — Medication 650 MILLIGRAM(S): at 05:11

## 2022-09-10 RX ADMIN — ENOXAPARIN SODIUM 70 MILLIGRAM(S): 100 INJECTION SUBCUTANEOUS at 05:11

## 2022-09-10 RX ADMIN — MIDODRINE HYDROCHLORIDE 5 MILLIGRAM(S): 2.5 TABLET ORAL at 05:11

## 2022-09-10 RX ADMIN — Medication 1 MILLIGRAM(S): at 13:30

## 2022-09-10 RX ADMIN — ROTIGOTINE 1 PATCH: 8 PATCH, EXTENDED RELEASE TRANSDERMAL at 11:33

## 2022-09-10 RX ADMIN — FAMOTIDINE 20 MILLIGRAM(S): 10 INJECTION INTRAVENOUS at 13:30

## 2022-09-10 RX ADMIN — MIRTAZAPINE 45 MILLIGRAM(S): 45 TABLET, ORALLY DISINTEGRATING ORAL at 22:22

## 2022-09-10 RX ADMIN — Medication 2.5 MILLIGRAM(S): at 22:22

## 2022-09-10 RX ADMIN — ENOXAPARIN SODIUM 70 MILLIGRAM(S): 100 INJECTION SUBCUTANEOUS at 18:15

## 2022-09-10 RX ADMIN — Medication 2.5 MILLIGRAM(S): at 05:11

## 2022-09-10 RX ADMIN — PREGABALIN 1000 MICROGRAM(S): 225 CAPSULE ORAL at 13:30

## 2022-09-10 RX ADMIN — ERTAPENEM SODIUM 120 MILLIGRAM(S): 1 INJECTION, POWDER, LYOPHILIZED, FOR SOLUTION INTRAMUSCULAR; INTRAVENOUS at 16:00

## 2022-09-10 RX ADMIN — POLYETHYLENE GLYCOL 3350 17 GRAM(S): 17 POWDER, FOR SOLUTION ORAL at 13:31

## 2022-09-10 RX ADMIN — MIDODRINE HYDROCHLORIDE 5 MILLIGRAM(S): 2.5 TABLET ORAL at 18:15

## 2022-09-10 RX ADMIN — Medication 2.5 MILLIGRAM(S): at 13:28

## 2022-09-10 RX ADMIN — MIDODRINE HYDROCHLORIDE 5 MILLIGRAM(S): 2.5 TABLET ORAL at 13:31

## 2022-09-10 NOTE — PROGRESS NOTE ADULT - ASSESSMENT
72y Female transferred from [facility] facility of lethargy and hypoxia.    PD  Hypoxemia  Effusion  elev D dimer  Frail  Weak  Elderly  OP  OA    VQ scan - unable to cooperate - on Lovenox full dose - LE doppler NEG  fever trend - vs noted - monitor WBC - ID follow up  - ABX adjustment noted - On Invanz now  cm follow up noted      ID eval in progress - Blood cx noted  labs and imaging reviewed  elev D dimer - contrast allergy cannot have CTA  HOB elev  asp prec  oral hygiene  pt has a PEG  pt has end stage Parkinson's disease - assist with needs - ADL - Fall prec - GOC - Pt is DNR  monitor VS and Sat  o2 support as needed - keep sat > 88 pct  prognosis guarded

## 2022-09-10 NOTE — PROGRESS NOTE ADULT - ASSESSMENT
MATEO LESLIE is a 72y Female transferred from [facility] facility of lethargy and hypoxia.   Patient is a terminal case of PD.   9/6/22 patient has fever, Blood and urine cultures in progress.  ID and pulmonary on the case

## 2022-09-10 NOTE — PROGRESS NOTE ADULT - SUBJECTIVE AND OBJECTIVE BOX
[facility] DIV[facility] of INFECTIOUS DISEASE  Cash Saini MD PhD, Mya Romero MD, Deann Zamarripa MD, Baldev Haynes MD, Chong Johns MD  and providing coverage with Lucinda Reynoso MD  Providing Infectious Disease Consultations at [facility], [facility], [facility]'s    Office# [phone] to schedule follow up appointments  Answering Service for urgent calls or New Consults [phone]  Cell# to text for urgent issues Cash Saini [phone]     infectious diseases progress note:    MATEO LESLIE is a 72y y. o. Female patient    Overnight and events of the last 24hrs reviewed    Allergies    Ceclor (Rash)  IV Contrast (Hypotension)  latex (Rash; Urticaria)    Intolerances        ANTIBIOTICS/RELEVANT:  antimicrobials  ertapenem  IVPB 1000 milliGRAM(s) IV Intermittent every 24 hours    immunologic:  influenza  Vaccine (HIGH DOSE) 0.7 milliLiter(s) IntraMuscular once    OTHER:  acetaminophen     Tablet .. 650 milliGRAM(s) Oral every 6 hours PRN  baclofen 2.5 milliGRAM(s) Oral every 8 hours  cyanocobalamin 1000 MICROGram(s) Oral daily  Duopa Enteral Suspension 1 Bottle,carbidopa 4.63mg/levodopa 20mg per mL 72 mL/Day 72 mL/Day Enteral Tube Continuous Pump  enoxaparin Injectable 70 milliGRAM(s) SubCutaneous every 12 hours  famotidine    Tablet 20 milliGRAM(s) Oral daily  folic acid 1 milliGRAM(s) Oral daily  midodrine. 5 milliGRAM(s) Oral three times a day  mirtazapine 45 milliGRAM(s) Oral at bedtime  polyethylene glycol 3350 17 Gram(s) Oral daily  rotigotine patch 4 mG/24 Hr(s) 1 Patch Transdermal every 24 hours  sertraline 100 milliGRAM(s) Oral daily      Objective:  Vital Signs Last 24 Hrs  T(C): 37.3 (10 Sep 2022 10:24), Max: 38.6 (10 Sep 2022 04:29)  T(F): 99.2 (10 Sep 2022 10:24), Max: 101.5 (10 Sep 2022 04:29)  HR: 92 (10 Sep 2022 10:24) (83 - 92)  BP: 109/65 (10 Sep 2022 10:24) (94/60 - 117/67)  BP(mean): --  RR: 18 (10 Sep 2022 10:24) (18 - 18)  SpO2: 94% (10 Sep 2022 10:24) (91% - 96%)    Parameters below as of 10 Sep 2022 10:24  Patient On (Oxygen Delivery Method): nasal cannula        T(C): 37.3 (09-10-22 @ 10:24), Max: 38.9 (09-08-22 @ 12:06)  T(C): 37.3 (09-10-22 @ 10:24), Max: 38.9 (09-08-22 @ 12:06)  T(C): 37.3 (09-10-22 @ 10:24), Max: 39.6 (09-06-22 @ 11:36)    PHYSICAL EXAM:  HEENT: NC atraumatic  Neck: supple  Respiratory: no accessory muscle use, breathing comfortably  Cardiovascular: distant  Gastrointestinal: normal appearing, nondistended  Extremities: no clubbing, no cyanosis,        LABS:                          8.5    17.78 )-----------( 429      ( 09 Sep 2022 07:29 )             27.4       WBC  17.78 09-09 @ 07:29  19.73 09-08 @ 06:40  17.45 09-07 @ 06:47  14.02 09-06 @ 00:07  10.86 09-05 @ 06:51  10.42 09-04 @ 06:24  10.65 09-04 @ 01:03      09-09    152<H>  |  120<H>  |  32<H>  ----------------------------<  132<H>  4.5   |  27  |  0.33<L>    Ca    8.6      09 Sep 2022 07:29  Mg     2.0     09-09        Creatinine, Serum: 0.33 mg/dL (09-09-22 @ 07:29)  Creatinine, Serum: 0.52 mg/dL (09-08-22 @ 06:40)  Creatinine, Serum: 0.50 mg/dL (09-07-22 @ 06:47)  Creatinine, Serum: 0.56 mg/dL (09-06-22 @ 06:58)  Creatinine, Serum: 0.65 mg/dL (09-05-22 @ 06:51)  Creatinine, Serum: 1.10 mg/dL (09-04-22 @ 06:24)  Creatinine, Serum: 1.50 mg/dL (09-04-22 @ 01:03)                INFLAMMATORY MARKERS  Auto Neutrophil #: 13.07 K/uL (09-09-22 @ 07:29)  Auto Lymphocyte #: 2.63 K/uL (09-09-22 @ 07:29)  Auto Neutrophil #: 14.21 K/uL (09-08-22 @ 06:40)  Auto Lymphocyte #: 2.69 K/uL (09-08-22 @ 06:40)  Auto Neutrophil #: 12.51 K/uL (09-07-22 @ 06:47)  Auto Lymphocyte #: 2.29 K/uL (09-07-22 @ 06:47)  Auto Neutrophil #: 8.77 K/uL (09-05-22 @ 06:51)  Auto Lymphocyte #: 1.21 K/uL (09-05-22 @ 06:51)  Auto Neutrophil #: 8.59 K/uL (09-04-22 @ 06:24)  Auto Lymphocyte #: 1.16 K/uL (09-04-22 @ 06:24)  Auto Lymphocyte #: 1.49 K/uL (09-04-22 @ 01:03)  Auto Neutrophil #: 8.63 K/uL (09-04-22 @ 01:03)    Lactate, Blood: 1.9 mmol/L (09-04-22 @ 06:24)  Lactate, Blood: 2.7 mmol/L (09-04-22 @ 02:51)  Lactate, Blood: 2.8 mmol/L (09-04-22 @ 01:03)    Auto Eosinophil #: 0.62 K/uL (09-09-22 @ 07:29)  Auto Eosinophil #: 0.70 K/uL (09-08-22 @ 06:40)  Auto Eosinophil #: 0.04 K/uL (09-07-22 @ 06:47)  Auto Eosinophil #: 0.13 K/uL (09-05-22 @ 06:51)  Auto Eosinophil #: 0.04 K/uL (09-04-22 @ 06:24)  Auto Eosinophil #: 0.00 K/uL (09-04-22 @ 01:03)                  Ferritin, Serum: 419 ng/mL (09-04-22 @ 02:51)        Activated Partial Thromboplastin Time: 26.9 sec (09-04-22 @ 01:03)  INR: 1.09 ratio (09-04-22 @ 01:03)    D-Dimer Assay, Quantitative: 1000 ng/mL DDU (09-06-22 @ 06:58)  D-Dimer Assay, Quantitative: 1731 ng/mL DDU (09-04-22 @ 08:42)        MICROBIOLOGY:              RADIOLOGY & ADDITIONAL STUDIES:

## 2022-09-10 NOTE — PROGRESS NOTE ADULT - SUBJECTIVE AND OBJECTIVE BOX
Patient is a 72y old  Female who presents with a chief complaint of Lethargic and hypoxic (06 Sep 2022 05:52)  9/8/22: Patient still had fever > 101    INTERVAL OVER NIGHT EVENTS:    MEDICATIONS  (STANDING):  baclofen 2.5 milliGRAM(s) Oral every 8 hours  cyanocobalamin 1000 MICROGram(s) Oral daily  Duopa Enteral Suspension 1 Bottle,carbidopa 4.63mg/levodopa 20mg per mL 72 mL/Day 72 mL/Day Enteral Tube Continuous Pump  enoxaparin Injectable 70 milliGRAM(s) SubCutaneous every 12 hours  ertapenem  IVPB 1000 milliGRAM(s) IV Intermittent every 24 hours  famotidine    Tablet 20 milliGRAM(s) Oral daily  folic acid 1 milliGRAM(s) Oral daily  influenza  Vaccine (HIGH DOSE) 0.7 milliLiter(s) IntraMuscular once  midodrine. 5 milliGRAM(s) Oral three times a day  mirtazapine 45 milliGRAM(s) Oral at bedtime  polyethylene glycol 3350 17 Gram(s) Oral daily  rotigotine patch 4 mG/24 Hr(s) 1 Patch Transdermal every 24 hours  sertraline 100 milliGRAM(s) Oral daily    MEDICATIONS  (PRN):  acetaminophen     Tablet .. 650 milliGRAM(s) Oral every 6 hours PRN Temp greater or equal to 38C (100.4F), Moderate Pain (4 - 6)    Allergies    Ceclor (Rash)  IV Contrast (Hypotension)  latex (Rash; Urticaria)    Intolerances    REVIEW OF SYSTEMS:  Non verbal has fever.    Vital Signs Last 24 Hrs  T(C): 38.6 (10 Sep 2022 04:29), Max: 38.6 (10 Sep 2022 04:29)  T(F): 101.5 (10 Sep 2022 04:29), Max: 101.5 (10 Sep 2022 04:29)  HR: 83 (10 Sep 2022 04:29) (83 - 92)  BP: 117/67 (10 Sep 2022 04:29) (94/60 - 117/67)  BP(mean): --  RR: 18 (10 Sep 2022 04:29) (18 - 18)  SpO2: 94% (10 Sep 2022 04:29) (91% - 96%)    Parameters below as of 10 Sep 2022 04:29  Patient On (Oxygen Delivery Method): nasal cannula    PHYSICAL EXAM:  GENERAL: Non verbal  HEAD:  Atraumatic, Normocephalic  EYES: PERRLA, conjunctiva and sclera clear  ENMT:  Moist mucous membranes, Good dentition, No lesions  NECK: Supple, No JVD, Normal thyroid  NERVOUS SYSTEM:   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  CHEST/LUNG: Clear to percussion bilaterally; No rales, rhonchi, wheezing, or rubs  HEART: Regular rate and rhythm; No murmurs, rubs, or gallops  ABDOMEN: Soft, Nondistended; Bowel sounds present.  GT in place  EXTREMITIES:  2+ Peripheral Pulses, No clubbing, cyanosis, or edema  LYMPH: No lymphadenopathy noted  SKIN: No rashes or lesions    LABS:                        9.5    19.73 )-----------( 475      ( 08 Sep 2022 06:40 )             31.4     08 Sep 2022 06:40    152    |  118    |  35     ----------------------------<  145    4.6     |  29     |  0.52     Ca    8.7        08 Sep 2022 06:40  Phos  2.2       08 Sep 2022 06:40  Mg     2.1       08 Sep 2022 06:40    TPro  6.8    /  Alb  2.0    /  TBili  0.2    /  DBili  x      /  AST  63     /  ALT  19     /  AlkPhos  135    08 Sep 2022 06:40    LIVER FUNCTIONS - ( 08 Sep 2022 06:40 )  Alb: 2.0 g/dL / Pro: 6.8 g/dL / ALK PHOS: 135 U/L / ALT: 19 U/L / AST: 63 U/L / GGT: x             CAPILLARY BLOOD GLUCOSE                        8.7    17.45 )-----------( 443      ( 07 Sep 2022 06:47 )             28.2     07 Sep 2022 06:47    152    |  117    |  31     ----------------------------<  180    3.9     |  30     |  0.50     Ca    8.4        07 Sep 2022 06:47  Mg     2.0       07 Sep 2022 06:47    TPro  6.8    /  Alb  2.2    /  TBili  0.5    /  DBili  x      /  AST  40     /  ALT  14     /  AlkPhos  193    06 Sep 2022 06:58    LIVER FUNCTIONS - ( 06 Sep 2022 06:58 )  Alb: 2.2 g/dL / Pro: 6.8 g/dL / ALK PHOS: 193 U/L / ALT: 14 U/L / AST: 40 U/L / GGT: x             CAPILLARY BLOOD GLUCOSE                                  8.1    14.02 )-----------( 294      ( 06 Sep 2022 00:07 )             25.6     09-05    140  |  105  |  57<H>  ----------------------------<  85  3.3<L>   |  25  |  0.65    Ca    8.8      05 Sep 2022 06:51  Phos  4.1     09-05  Mg     2.3     09-05    TPro  6.6  /  Alb  2.0<L>  /  TBili  0.6  /  DBili  x   /  AST  28  /  ALT  11<L>  /  AlkPhos  185<H>  09-05        CAPILLARY BLOOD GLUCOSE          RADIOLOGY & ADDITIONAL TESTS:    Imaging Personally Reviewed:  [x] YES  [ ] NO    Consultant(s) Notes Reviewed:  [x] YES  [ ] NO    Care Discussed with Consultants/Other Providers [x] YES  [ ] NO

## 2022-09-10 NOTE — PROGRESS NOTE ADULT - SUBJECTIVE AND OBJECTIVE BOX
Date/Time Patient Seen:  		  Referring MD:   Data Reviewed	       Patient is a 72y old  Female who presents with a chief complaint of Lethargic and hypoxic (09 Sep 2022 09:33)      Subjective/HPI     PAST MEDICAL & SURGICAL HISTORY:  Constipation    Unilateral primary osteoarthritis, left hip    Parkinson&#x27;s disease    Basal cell carcinoma  scalp    GERD (gastroesophageal reflux disease)    Seasonal allergies    Depression    Acute respiratory failure with hypoxia    S/P deep brain stimulator placement  2006, 2 brain pacemakers Model #44362, batteries replaced 2014    History of   10/1982    H/O ovarian cystectomy  right,     H/O colonoscopy  , 2008 polypectomy, ,     Basal cell carcinoma  removal, mid vertex scalp, 2002    Abdominal hernia  with mesh, 2003          Medication list         MEDICATIONS  (STANDING):  baclofen 2.5 milliGRAM(s) Oral every 8 hours  cyanocobalamin 1000 MICROGram(s) Oral daily  Duopa Enteral Suspension 1 Bottle,carbidopa 4.63mg/levodopa 20mg per mL 72 mL/Day 72 mL/Day Enteral Tube Continuous Pump  enoxaparin Injectable 70 milliGRAM(s) SubCutaneous every 12 hours  ertapenem  IVPB 1000 milliGRAM(s) IV Intermittent every 24 hours  famotidine    Tablet 20 milliGRAM(s) Oral daily  folic acid 1 milliGRAM(s) Oral daily  influenza  Vaccine (HIGH DOSE) 0.7 milliLiter(s) IntraMuscular once  midodrine. 5 milliGRAM(s) Oral three times a day  mirtazapine 45 milliGRAM(s) Oral at bedtime  polyethylene glycol 3350 17 Gram(s) Oral daily  rotigotine patch 4 mG/24 Hr(s) 1 Patch Transdermal every 24 hours  sertraline 100 milliGRAM(s) Oral daily    MEDICATIONS  (PRN):  acetaminophen     Tablet .. 650 milliGRAM(s) Oral every 6 hours PRN Temp greater or equal to 38C (100.4F), Moderate Pain (4 - 6)         Vitals log        ICU Vital Signs Last 24 Hrs  T(C): 38.6 (10 Sep 2022 04:29), Max: 38.6 (10 Sep 2022 04:29)  T(F): 101.5 (10 Sep 2022 04:29), Max: 101.5 (10 Sep 2022 04:29)  HR: 83 (10 Sep 2022 04:29) (83 - 92)  BP: 117/67 (10 Sep 2022 04:29) (94/60 - 117/67)  BP(mean): --  ABP: --  ABP(mean): --  RR: 18 (10 Sep 2022 04:29) (18 - 18)  SpO2: 94% (10 Sep 2022 04:29) (91% - 96%)    O2 Parameters below as of 10 Sep 2022 04:29  Patient On (Oxygen Delivery Method): nasal cannula                 Input and Output:  I&O's Detail    08 Sep 2022 07:01  -  09 Sep 2022 07:00  --------------------------------------------------------  IN:  Total IN: 0 mL    OUT:    Indwelling Catheter - Urethral (mL): 1550 mL  Total OUT: 1550 mL    Total NET: -1550 mL      09 Sep 2022 07:01  -  10 Sep 2022 05:52  --------------------------------------------------------  IN:    Jevity 1.5: 420 mL  Total IN: 420 mL    OUT:    Indwelling Catheter - Urethral (mL): 750 mL    Voided (mL): 500 mL  Total OUT: 1250 mL    Total NET: -830 mL          Lab Data                        8.5    17.78 )-----------( 429      ( 09 Sep 2022 07:29 )             27.4         152<H>  |  120<H>  |  32<H>  ----------------------------<  132<H>  4.5   |  27  |  0.33<L>    Ca    8.6      09 Sep 2022 07:29  Phos  2.2     -08  Mg     2.0         TPro  6.8  /  Alb  2.0<L>  /  TBili  0.2  /  DBili  x   /  AST  63<H>  /  ALT  19  /  AlkPhos  135<H>  09-08            Review of Systems	      Objective     Physical Examination    heart s1s2  lung dc BS  abd soft      Pertinent Lab findings & Imaging      Yoel:  NO   Adequate UO     I&O's Detail    08 Sep 2022 07:01  -  09 Sep 2022 07:00  --------------------------------------------------------  IN:  Total IN: 0 mL    OUT:    Indwelling Catheter - Urethral (mL): 1550 mL  Total OUT: 1550 mL    Total NET: -1550 mL      09 Sep 2022 07:01  -  10 Sep 2022 05:52  --------------------------------------------------------  IN:    Jevity 1.5: 420 mL  Total IN: 420 mL    OUT:    Indwelling Catheter - Urethral (mL): 750 mL    Voided (mL): 500 mL  Total OUT: 1250 mL    Total NET: -830 mL               Discussed with:     Cultures:	        Radiology

## 2022-09-11 LAB
CULTURE RESULTS: SIGNIFICANT CHANGE UP
CULTURE RESULTS: SIGNIFICANT CHANGE UP
SPECIMEN SOURCE: SIGNIFICANT CHANGE UP
SPECIMEN SOURCE: SIGNIFICANT CHANGE UP

## 2022-09-11 RX ADMIN — MIDODRINE HYDROCHLORIDE 5 MILLIGRAM(S): 2.5 TABLET ORAL at 05:54

## 2022-09-11 RX ADMIN — PREGABALIN 1000 MICROGRAM(S): 225 CAPSULE ORAL at 11:16

## 2022-09-11 RX ADMIN — POLYETHYLENE GLYCOL 3350 17 GRAM(S): 17 POWDER, FOR SOLUTION ORAL at 11:16

## 2022-09-11 RX ADMIN — ROTIGOTINE 1 PATCH: 8 PATCH, EXTENDED RELEASE TRANSDERMAL at 18:04

## 2022-09-11 RX ADMIN — Medication 2.5 MILLIGRAM(S): at 05:54

## 2022-09-11 RX ADMIN — ENOXAPARIN SODIUM 70 MILLIGRAM(S): 100 INJECTION SUBCUTANEOUS at 17:10

## 2022-09-11 RX ADMIN — ROTIGOTINE 1 PATCH: 8 PATCH, EXTENDED RELEASE TRANSDERMAL at 11:15

## 2022-09-11 RX ADMIN — Medication 2.5 MILLIGRAM(S): at 13:43

## 2022-09-11 RX ADMIN — ENOXAPARIN SODIUM 70 MILLIGRAM(S): 100 INJECTION SUBCUTANEOUS at 05:55

## 2022-09-11 RX ADMIN — MIDODRINE HYDROCHLORIDE 5 MILLIGRAM(S): 2.5 TABLET ORAL at 11:16

## 2022-09-11 RX ADMIN — Medication 1 MILLIGRAM(S): at 11:16

## 2022-09-11 RX ADMIN — ERTAPENEM SODIUM 120 MILLIGRAM(S): 1 INJECTION, POWDER, LYOPHILIZED, FOR SOLUTION INTRAMUSCULAR; INTRAVENOUS at 13:43

## 2022-09-11 RX ADMIN — ROTIGOTINE 1 PATCH: 8 PATCH, EXTENDED RELEASE TRANSDERMAL at 10:49

## 2022-09-11 RX ADMIN — ROTIGOTINE 1 PATCH: 8 PATCH, EXTENDED RELEASE TRANSDERMAL at 07:25

## 2022-09-11 RX ADMIN — SERTRALINE 100 MILLIGRAM(S): 25 TABLET, FILM COATED ORAL at 11:16

## 2022-09-11 RX ADMIN — Medication 2.5 MILLIGRAM(S): at 23:01

## 2022-09-11 RX ADMIN — MIDODRINE HYDROCHLORIDE 5 MILLIGRAM(S): 2.5 TABLET ORAL at 17:10

## 2022-09-11 RX ADMIN — MIRTAZAPINE 45 MILLIGRAM(S): 45 TABLET, ORALLY DISINTEGRATING ORAL at 23:01

## 2022-09-11 RX ADMIN — FAMOTIDINE 20 MILLIGRAM(S): 10 INJECTION INTRAVENOUS at 11:16

## 2022-09-11 NOTE — PROGRESS NOTE ADULT - SUBJECTIVE AND OBJECTIVE BOX
Patient is a 72y old  Female who presents with a chief complaint of Lethargic and hypoxic (06 Sep 2022 05:52)  9/8/22: Patient still had fever > 101    INTERVAL OVER NIGHT EVENTS:    MEDICATIONS  (STANDING):  baclofen 2.5 milliGRAM(s) Oral every 8 hours  cyanocobalamin 1000 MICROGram(s) Oral daily  Duopa Enteral Suspension 1 Bottle,carbidopa 4.63mg/levodopa 20mg per mL 72 mL/Day 72 mL/Day Enteral Tube Continuous Pump  enoxaparin Injectable 70 milliGRAM(s) SubCutaneous every 12 hours  ertapenem  IVPB 1000 milliGRAM(s) IV Intermittent every 24 hours  famotidine    Tablet 20 milliGRAM(s) Oral daily  folic acid 1 milliGRAM(s) Oral daily  influenza  Vaccine (HIGH DOSE) 0.7 milliLiter(s) IntraMuscular once  midodrine. 5 milliGRAM(s) Oral three times a day  mirtazapine 45 milliGRAM(s) Oral at bedtime  polyethylene glycol 3350 17 Gram(s) Oral daily  rotigotine patch 4 mG/24 Hr(s) 1 Patch Transdermal every 24 hours  sertraline 100 milliGRAM(s) Oral daily    MEDICATIONS  (PRN):  acetaminophen     Tablet .. 650 milliGRAM(s) Oral every 6 hours PRN Temp greater or equal to 38C (100.4F), Moderate Pain (4 - 6)    Allergies    Ceclor (Rash)  IV Contrast (Hypotension)  latex (Rash; Urticaria)    Intolerances    REVIEW OF SYSTEMS:  Non verbal has fever.    Vital Signs Last 24 Hrs  T(C): 37.1 (11 Sep 2022 20:33), Max: 37.1 (11 Sep 2022 20:33)  T(F): 98.7 (11 Sep 2022 20:33), Max: 98.7 (11 Sep 2022 20:33)  HR: 87 (11 Sep 2022 20:33) (82 - 92)  BP: 99/61 (11 Sep 2022 20:33) (99/61 - 107/65)  BP(mean): --  RR: 16 (11 Sep 2022 20:33) (16 - 18)  SpO2: 96% (11 Sep 2022 20:33) (93% - 97%)    Parameters below as of 11 Sep 2022 20:33  Patient On (Oxygen Delivery Method): nasal cannula    PHYSICAL EXAM:  GENERAL: Non verbal  HEAD:  Atraumatic, Normocephalic  EYES: PERRLA, conjunctiva and sclera clear  ENMT:  Moist mucous membranes, Good dentition, No lesions  NECK: Supple, No JVD, Normal thyroid  NERVOUS SYSTEM:   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  CHEST/LUNG: Clear to percussion bilaterally; No rales, rhonchi, wheezing, or rubs  HEART: Regular rate and rhythm; No murmurs, rubs, or gallops  ABDOMEN: Soft, Nondistended; Bowel sounds present.  GT in place  EXTREMITIES:  2+ Peripheral Pulses, No clubbing, cyanosis, or edema  LYMPH: No lymphadenopathy noted  SKIN: No rashes or lesions    LABS:                          9.5    19.73 )-----------( 475      ( 08 Sep 2022 06:40 )             31.4     08 Sep 2022 06:40    152    |  118    |  35     ----------------------------<  145    4.6     |  29     |  0.52     Ca    8.7        08 Sep 2022 06:40  Phos  2.2       08 Sep 2022 06:40  Mg     2.1       08 Sep 2022 06:40    TPro  6.8    /  Alb  2.0    /  TBili  0.2    /  DBili  x      /  AST  63     /  ALT  19     /  AlkPhos  135    08 Sep 2022 06:40    LIVER FUNCTIONS - ( 08 Sep 2022 06:40 )  Alb: 2.0 g/dL / Pro: 6.8 g/dL / ALK PHOS: 135 U/L / ALT: 19 U/L / AST: 63 U/L / GGT: x             CAPILLARY BLOOD GLUCOSE                        8.7    17.45 )-----------( 443      ( 07 Sep 2022 06:47 )             28.2     07 Sep 2022 06:47    152    |  117    |  31     ----------------------------<  180    3.9     |  30     |  0.50     Ca    8.4        07 Sep 2022 06:47  Mg     2.0       07 Sep 2022 06:47    TPro  6.8    /  Alb  2.2    /  TBili  0.5    /  DBili  x      /  AST  40     /  ALT  14     /  AlkPhos  193    06 Sep 2022 06:58    LIVER FUNCTIONS - ( 06 Sep 2022 06:58 )  Alb: 2.2 g/dL / Pro: 6.8 g/dL / ALK PHOS: 193 U/L / ALT: 14 U/L / AST: 40 U/L / GGT: x             CAPILLARY BLOOD GLUCOSE                                  8.1    14.02 )-----------( 294      ( 06 Sep 2022 00:07 )             25.6     09-05    140  |  105  |  57<H>  ----------------------------<  85  3.3<L>   |  25  |  0.65    Ca    8.8      05 Sep 2022 06:51  Phos  4.1     09-05  Mg     2.3     09-05    TPro  6.6  /  Alb  2.0<L>  /  TBili  0.6  /  DBili  x   /  AST  28  /  ALT  11<L>  /  AlkPhos  185<H>  09-05        CAPILLARY BLOOD GLUCOSE          RADIOLOGY & ADDITIONAL TESTS:    Imaging Personally Reviewed:  [x] YES  [ ] NO    Consultant(s) Notes Reviewed:  [x] YES  [ ] NO    Care Discussed with Consultants/Other Providers [x] YES  [ ] NO

## 2022-09-11 NOTE — PROGRESS NOTE ADULT - ASSESSMENT
72[facility]    PD  Hypoxemia  Effusion  elev D dimer  Frail  Weak  Elderly  OP  OA    VQ scan - unable to cooperate - on Lovenox full dose - LE doppler NEG  afebrile overnight  vs noted  labs reviewed  on Invanz        ID jonathonal in progress - Blood cx noted  labs and imaging reviewed  elev D dimer - contrast allergy cannot have CTA  HOB elev  asp prec  oral hygiene  pt has a PEG  pt has end stage Parkinson's disease - assist with needs - ADL - Fall prec - GOC - Pt is DNR  monitor VS and Sat  o2 support as needed - keep sat > 88 pct  prognosis guarded

## 2022-09-11 NOTE — PROGRESS NOTE ADULT - SUBJECTIVE AND OBJECTIVE BOX
Date/Time Patient Seen:  		  Referring MD:   Data Reviewed	       Patient is a 72y old  Female who presents with a chief complaint of Lethargic and hypoxic (10 Sep 2022 10:50)      Subjective/HPI     PAST MEDICAL & SURGICAL HISTORY:  Constipation    Unilateral primary osteoarthritis, left hip    Parkinson&#x27;s disease    Basal cell carcinoma  scalp    GERD (gastroesophageal reflux disease)    Seasonal allergies    Depression    Acute respiratory failure with hypoxia    S/P deep brain stimulator placement  2006, 2 brain pacemakers Model #39949, batteries replaced 2014    History of   10/1982    H/O ovarian cystectomy  right,     H/O colonoscopy  , 2008 polypectomy, ,     Basal cell carcinoma  removal, mid vertex scalp, 2002    Abdominal hernia  with mesh, 2003          Medication list         MEDICATIONS  (STANDING):  baclofen 2.5 milliGRAM(s) Oral every 8 hours  cyanocobalamin 1000 MICROGram(s) Oral daily  Duopa Enteral Suspension 1 Bottle,carbidopa 4.63mg/levodopa 20mg per mL 72 mL/Day 72 mL/Day Enteral Tube Continuous Pump  enoxaparin Injectable 70 milliGRAM(s) SubCutaneous every 12 hours  ertapenem  IVPB 1000 milliGRAM(s) IV Intermittent every 24 hours  famotidine    Tablet 20 milliGRAM(s) Oral daily  folic acid 1 milliGRAM(s) Oral daily  influenza  Vaccine (HIGH DOSE) 0.7 milliLiter(s) IntraMuscular once  midodrine. 5 milliGRAM(s) Oral three times a day  mirtazapine 45 milliGRAM(s) Oral at bedtime  polyethylene glycol 3350 17 Gram(s) Oral daily  rotigotine patch 4 mG/24 Hr(s) 1 Patch Transdermal every 24 hours  sertraline 100 milliGRAM(s) Oral daily    MEDICATIONS  (PRN):  acetaminophen     Tablet .. 650 milliGRAM(s) Oral every 6 hours PRN Temp greater or equal to 38C (100.4F), Moderate Pain (4 - 6)         Vitals log        ICU Vital Signs Last 24 Hrs  T(C): 36.9 (11 Sep 2022 05:21), Max: 38.2 (10 Sep 2022 07:30)  T(F): 98.5 (11 Sep 2022 05:21), Max: 100.8 (10 Sep 2022 07:30)  HR: 88 (11 Sep 2022 05:21) (82 - 92)  BP: 103/66 (11 Sep 2022 05:21) (99/62 - 109/65)  BP(mean): --  ABP: --  ABP(mean): --  RR: 18 (11 Sep 2022 05:21) (16 - 18)  SpO2: 93% (11 Sep 2022 05:21) (93% - 97%)    O2 Parameters below as of 11 Sep 2022 05:21  Patient On (Oxygen Delivery Method): nasal cannula                 Input and Output:  I&O's Detail    09 Sep 2022 07:01  -  10 Sep 2022 07:00  --------------------------------------------------------  IN:    Jevity 1.5: 720 mL  Total IN: 720 mL    OUT:    Indwelling Catheter - Urethral (mL): 750 mL    Voided (mL): 500 mL  Total OUT: 1250 mL    Total NET: -530 mL      10 Sep 2022 07:01  -  11 Sep 2022 05:41  --------------------------------------------------------  IN:  Total IN: 0 mL    OUT:    Indwelling Catheter - Urethral (mL): 1050 mL  Total OUT: 1050 mL    Total NET: -1050 mL          Lab Data                        8.5    17.78 )-----------( 429      ( 09 Sep 2022 07:29 )             27.4         152<H>  |  120<H>  |  32<H>  ----------------------------<  132<H>  4.5   |  27  |  0.33<L>    Ca    8.6      09 Sep 2022 07:29  Mg     2.0     -09              Review of Systems	      Objective     Physical Examination    heart s1s2  lung dc BS  abd soft      Pertinent Lab findings & Imaging      Yoel:  NO   Adequate UO     I&O's Detail    09 Sep 2022 07:01  -  10 Sep 2022 07:00  --------------------------------------------------------  IN:    Jevity 1.5: 720 mL  Total IN: 720 mL    OUT:    Indwelling Catheter - Urethral (mL): 750 mL    Voided (mL): 500 mL  Total OUT: 1250 mL    Total NET: -530 mL      10 Sep 2022 07:01  -  11 Sep 2022 05:41  --------------------------------------------------------  IN:  Total IN: 0 mL    OUT:    Indwelling Catheter - Urethral (mL): 1050 mL  Total OUT: 1050 mL    Total NET: -1050 mL               Discussed with:     Cultures:	        Radiology

## 2022-09-11 NOTE — PROGRESS NOTE ADULT - ASSESSMENT
[facility] Infectious [facility]  Pt well know to our group from care at [facility]     72y Female transferred from [facility] of lethargy and hypoxia.   Patient is a terminal case of PD.   At ED arrival her O2 sat was 72%.  Patient DNR/DNI started on 100% NRBM and her saturation improved.   Patient is non communicative most of informations received from [facility] facility and from spouse.  Patient has PEG/JT and on feeding and Parkinson's Disease medications.  Blood culture (collected 9/4 1am) Proteus species ESBL  Blood culture collected 9/6-NGTD    RECOMMENDATIONS  PT with acute respiratory distress and on imaging suggestion of LLL PNA. And now noted to have ESBL Proteus bacteremia  recommended meropenem first day 9/5 9/7 changed to ertapenem but noted elevated wbc but clinically appears slightly improved  -continue ertapenem with anticipated duration 9/18 but recs to follow    repeat blood cultures collected 9/6-NGTD  -ESBL proteus in urine so potential source as acute pyelonephritis with bacteremia    All interventions in keeping with goals of care     Thank you for consulting us and involving us in the management of this most interesting and challenging case.  We will follow along in the care of this patient. Please call us at [phone] or text me directly on my cell# at [phone] with any concerns.

## 2022-09-11 NOTE — PROGRESS NOTE ADULT - ASSESSMENT
MATEO LESLIE is a 72y Female transferred from [facility] facility of lethargy and hypoxia.   Patient is a terminal case of PD.   9/6/22 patient has fever, Blood and urine cultures in progress.  ID and pulmonary on the case

## 2022-09-11 NOTE — PROGRESS NOTE ADULT - SUBJECTIVE AND OBJECTIVE BOX
[facility] DIVISION of INFECTIOUS DISEASE  Cash Saini MD PhD, Mya Romero MD, Deann Zamarripa MD, Baldev Haynes MD, Chong Johns MD  and providing coverage with Lucinda Reynoso MD  Providing Infectious Disease Consultations at [facility], [facility], [facility], [facility]'s    Office# [phone] to schedule follow up appointments  Answering Service for urgent calls or New Consults [phone]  Cell# to text for urgent issues Cash Saini [phone]     infectious diseases progress note:    MATEO LESLIE is a 72y y. o. Female patient    Overnight and events of the last 24hrs reviewed    Allergies    Ceclor (Rash)  IV Contrast (Hypotension)  latex (Rash; Urticaria)    Intolerances        ANTIBIOTICS/RELEVANT:  antimicrobials  ertapenem  IVPB 1000 milliGRAM(s) IV Intermittent every 24 hours    immunologic:  influenza  Vaccine (HIGH DOSE) 0.7 milliLiter(s) IntraMuscular once    OTHER:  acetaminophen     Tablet .. 650 milliGRAM(s) Oral every 6 hours PRN  baclofen 2.5 milliGRAM(s) Oral every 8 hours  cyanocobalamin 1000 MICROGram(s) Oral daily  Duopa Enteral Suspension 1 Bottle,carbidopa 4.63mg/levodopa 20mg per mL 72 mL/Day 72 mL/Day Enteral Tube Continuous Pump  enoxaparin Injectable 70 milliGRAM(s) SubCutaneous every 12 hours  famotidine    Tablet 20 milliGRAM(s) Oral daily  folic acid 1 milliGRAM(s) Oral daily  midodrine. 5 milliGRAM(s) Oral three times a day  mirtazapine 45 milliGRAM(s) Oral at bedtime  polyethylene glycol 3350 17 Gram(s) Oral daily  rotigotine patch 4 mG/24 Hr(s) 1 Patch Transdermal every 24 hours  sertraline 100 milliGRAM(s) Oral daily      Objective:  Vital Signs Last 24 Hrs  T(C): 36.9 (11 Sep 2022 05:21), Max: 37.2 (10 Sep 2022 12:39)  T(F): 98.5 (11 Sep 2022 05:21), Max: 99 (10 Sep 2022 12:39)  HR: 88 (11 Sep 2022 05:21) (82 - 88)  BP: 103/66 (11 Sep 2022 05:21) (99/62 - 107/65)  BP(mean): --  RR: 18 (11 Sep 2022 05:21) (16 - 18)  SpO2: 93% (11 Sep 2022 05:21) (93% - 97%)    Parameters below as of 11 Sep 2022 05:21  Patient On (Oxygen Delivery Method): nasal cannula        T(C): 36.9 (09-11-22 @ 05:21), Max: 38.6 (09-10-22 @ 04:29)  T(C): 36.9 (09-11-22 @ 05:21), Max: 38.9 (09-08-22 @ 12:06)  T(C): 36.9 (09-11-22 @ 05:21), Max: 38.9 (09-08-22 @ 12:06)    PHYSICAL EXAM:  HEENT: NC atraumatic  Neck: supple  Respiratory: no accessory muscle use, breathing comfortably  Cardiovascular: distant  Gastrointestinal: normal appearing, nondistended  Extremities: no clubbing, no cyanosis,        LABS:        WBC  17.78 09-09 @ 07:29  19.73 09-08 @ 06:40  17.45 09-07 @ 06:47  14.02 09-06 @ 00:07  10.86 09-05 @ 06:51              Creatinine, Serum: 0.33 mg/dL (09-09-22 @ 07:29)  Creatinine, Serum: 0.52 mg/dL (09-08-22 @ 06:40)  Creatinine, Serum: 0.50 mg/dL (09-07-22 @ 06:47)  Creatinine, Serum: 0.56 mg/dL (09-06-22 @ 06:58)  Creatinine, Serum: 0.65 mg/dL (09-05-22 @ 06:51)                INFLAMMATORY MARKERS  Auto Neutrophil #: 13.07 K/uL (09-09-22 @ 07:29)  Auto Lymphocyte #: 2.63 K/uL (09-09-22 @ 07:29)  Auto Neutrophil #: 14.21 K/uL (09-08-22 @ 06:40)  Auto Lymphocyte #: 2.69 K/uL (09-08-22 @ 06:40)  Auto Neutrophil #: 12.51 K/uL (09-07-22 @ 06:47)  Auto Lymphocyte #: 2.29 K/uL (09-07-22 @ 06:47)  Auto Neutrophil #: 8.77 K/uL (09-05-22 @ 06:51)  Auto Lymphocyte #: 1.21 K/uL (09-05-22 @ 06:51)  Auto Neutrophil #: 8.59 K/uL (09-04-22 @ 06:24)  Auto Lymphocyte #: 1.16 K/uL (09-04-22 @ 06:24)  Auto Lymphocyte #: 1.49 K/uL (09-04-22 @ 01:03)  Auto Neutrophil #: 8.63 K/uL (09-04-22 @ 01:03)    Lactate, Blood: 1.9 mmol/L (09-04-22 @ 06:24)  Lactate, Blood: 2.7 mmol/L (09-04-22 @ 02:51)  Lactate, Blood: 2.8 mmol/L (09-04-22 @ 01:03)    Auto Eosinophil #: 0.62 K/uL (09-09-22 @ 07:29)  Auto Eosinophil #: 0.70 K/uL (09-08-22 @ 06:40)  Auto Eosinophil #: 0.04 K/uL (09-07-22 @ 06:47)  Auto Eosinophil #: 0.13 K/uL (09-05-22 @ 06:51)  Auto Eosinophil #: 0.04 K/uL (09-04-22 @ 06:24)  Auto Eosinophil #: 0.00 K/uL (09-04-22 @ 01:03)                  Ferritin, Serum: 419 ng/mL (09-04-22 @ 02:51)        Activated Partial Thromboplastin Time: 26.9 sec (09-04-22 @ 01:03)  INR: 1.09 ratio (09-04-22 @ 01:03)    D-Dimer Assay, Quantitative: 1000 ng/mL DDU (09-06-22 @ 06:58)  D-Dimer Assay, Quantitative: 1731 ng/mL DDU (09-04-22 @ 08:42)        MICROBIOLOGY:    Culture - Blood (09.06.22 @ 12:05)    Specimen Source: .Blood Blood-Venous    Culture Results:   No growth to date.    Culture - Blood (09.04.22 @ 01:16)    -  Proteus species: Detec    -  CTX-M Resistance Marker: Detec    -  ESBL: Detec    -  Trimethoprim/Sulfamethoxazole: R >2/38    -  Piperacillin/Tazobactam: R <=8    -  Tobramycin: I 8    -  Levofloxacin: R >4    -  Cefazolin: R >16 Enterobacter, Klebsiella aerogenes, Citrobacter, and Serratia may develop resistance during prolonged therapy (3-4 days)    -  Cefepime: R >16    -  Ceftriaxone: R >32 Enterobacter, Klebsiella aerogenes, Citrobacter, and Serratia may develop resistance during prolonged therapy    -  Ciprofloxacin: R >2    -  Ertapenem: S <=0.5    -  Gentamicin: R >8    -  Meropenem: S <=1    -  Amikacin: S <=16    Gram Stain:   Growth in aerobic and anaerobic bottles: Gram Negative Rods    -  Ampicillin: R >16 These ampicillin results predict results for amoxicillin    -  Ampicillin/Sulbactam: R 8/4 Enterobacter, Klebsiella aerogenes, Citrobacter, and Serratia may develop resistance during prolonged therapy (3-4 days)    -  Aztreonam: R <=4    Specimen Source: .Blood Blood-Peripheral    Organism: Blood Culture PCR    Organism: Proteus mirabilis ESBL    Culture Results:   Growth in aerobic and anaerobic bottles: Proteus mirabilis ESBL  ***Blood Panel PCR results on this specimen are available  approximately 3 hours after the Gram stain result.***  Gram stain, PCR, and/or culture results may not always  correspond due to difference in methodologies.  ************************************************************  This PCR assay was performed by multiplex PCR. This  Assay tests for 66 bacterial and resistance gene targets.  Please refer to the [facility] test directory  at [email or web address] for details.    Organism Identification: Blood Culture PCR  Proteus mirabilis ESBL    Method Type: PCR    Method Type: KEN        RADIOLOGY & ADDITIONAL STUDIES:

## 2022-09-12 LAB
ALBUMIN SERPL ELPH-MCNC: 2.1 G/DL — LOW (ref 3.3–5)
ALP SERPL-CCNC: 95 U/L — SIGNIFICANT CHANGE UP (ref 40–120)
ALT FLD-CCNC: 19 U/L — SIGNIFICANT CHANGE UP (ref 12–78)
ANION GAP SERPL CALC-SCNC: 5 MMOL/L — SIGNIFICANT CHANGE UP (ref 5–17)
ANISOCYTOSIS BLD QL: SLIGHT — SIGNIFICANT CHANGE UP
AST SERPL-CCNC: 47 U/L — HIGH (ref 15–37)
BASO STIPL BLD QL SMEAR: PRESENT — SIGNIFICANT CHANGE UP
BASOPHILS # BLD AUTO: 0.13 K/UL — SIGNIFICANT CHANGE UP (ref 0–0.2)
BASOPHILS NFR BLD AUTO: 1 % — SIGNIFICANT CHANGE UP (ref 0–2)
BILIRUB SERPL-MCNC: 0.2 MG/DL — SIGNIFICANT CHANGE UP (ref 0.2–1.2)
BUN SERPL-MCNC: 26 MG/DL — HIGH (ref 7–23)
CALCIUM SERPL-MCNC: 8.5 MG/DL — SIGNIFICANT CHANGE UP (ref 8.5–10.1)
CHLORIDE SERPL-SCNC: 115 MMOL/L — HIGH (ref 96–108)
CO2 SERPL-SCNC: 29 MMOL/L — SIGNIFICANT CHANGE UP (ref 22–31)
CREAT SERPL-MCNC: 0.39 MG/DL — LOW (ref 0.5–1.3)
EGFR: 104 ML/MIN/1.73M2 — SIGNIFICANT CHANGE UP
EOSINOPHIL # BLD AUTO: 0 K/UL — SIGNIFICANT CHANGE UP (ref 0–0.5)
EOSINOPHIL NFR BLD AUTO: 0 % — SIGNIFICANT CHANGE UP (ref 0–6)
GLUCOSE SERPL-MCNC: 110 MG/DL — HIGH (ref 70–99)
HCT VFR BLD CALC: 28.2 % — LOW (ref 34.5–45)
HGB BLD-MCNC: 8.7 G/DL — LOW (ref 11.5–15.5)
LG PLATELETS BLD QL AUTO: SLIGHT — SIGNIFICANT CHANGE UP
LYMPHOCYTES # BLD AUTO: 19 % — SIGNIFICANT CHANGE UP (ref 13–44)
LYMPHOCYTES # BLD AUTO: 2.53 K/UL — SIGNIFICANT CHANGE UP (ref 1–3.3)
MACROCYTES BLD QL: SLIGHT — SIGNIFICANT CHANGE UP
MAGNESIUM SERPL-MCNC: 2 MG/DL — SIGNIFICANT CHANGE UP (ref 1.6–2.6)
MANUAL SMEAR VERIFICATION: SIGNIFICANT CHANGE UP
MCHC RBC-ENTMCNC: 27 PG — SIGNIFICANT CHANGE UP (ref 27–34)
MCHC RBC-ENTMCNC: 30.9 GM/DL — LOW (ref 32–36)
MCV RBC AUTO: 87.6 FL — SIGNIFICANT CHANGE UP (ref 80–100)
MICROCYTES BLD QL: SLIGHT — SIGNIFICANT CHANGE UP
MONOCYTES # BLD AUTO: 0.4 K/UL — SIGNIFICANT CHANGE UP (ref 0–0.9)
MONOCYTES NFR BLD AUTO: 3 % — SIGNIFICANT CHANGE UP (ref 2–14)
MYELOCYTES NFR BLD: 1 % — HIGH (ref 0–0)
NEUTROPHILS # BLD AUTO: 10.14 K/UL — HIGH (ref 1.8–7.4)
NEUTROPHILS NFR BLD AUTO: 76 % — SIGNIFICANT CHANGE UP (ref 43–77)
NRBC # BLD: 0 — SIGNIFICANT CHANGE UP
NRBC # BLD: SIGNIFICANT CHANGE UP /100 WBCS (ref 0–0)
PLAT MORPH BLD: SIGNIFICANT CHANGE UP
PLATELET # BLD AUTO: 542 K/UL — HIGH (ref 150–400)
POLYCHROMASIA BLD QL SMEAR: SLIGHT — SIGNIFICANT CHANGE UP
POTASSIUM SERPL-MCNC: 4.6 MMOL/L — SIGNIFICANT CHANGE UP (ref 3.5–5.3)
POTASSIUM SERPL-SCNC: 4.6 MMOL/L — SIGNIFICANT CHANGE UP (ref 3.5–5.3)
PROT SERPL-MCNC: 6.3 G/DL — SIGNIFICANT CHANGE UP (ref 6–8.3)
RBC # BLD: 3.22 M/UL — LOW (ref 3.8–5.2)
RBC # FLD: 18.1 % — HIGH (ref 10.3–14.5)
RBC BLD AUTO: ABNORMAL
SODIUM SERPL-SCNC: 149 MMOL/L — HIGH (ref 135–145)
WBC # BLD: 13.34 K/UL — HIGH (ref 3.8–10.5)
WBC # FLD AUTO: 13.34 K/UL — HIGH (ref 3.8–10.5)

## 2022-09-12 RX ORDER — IBUPROFEN 200 MG
600 TABLET ORAL ONCE
Refills: 0 | Status: COMPLETED | OUTPATIENT
Start: 2022-09-12 | End: 2022-09-12

## 2022-09-12 RX ORDER — ACETAMINOPHEN 500 MG
650 TABLET ORAL EVERY 4 HOURS
Refills: 0 | Status: DISCONTINUED | OUTPATIENT
Start: 2022-09-12 | End: 2022-09-16

## 2022-09-12 RX ADMIN — ROTIGOTINE 1 PATCH: 8 PATCH, EXTENDED RELEASE TRANSDERMAL at 12:35

## 2022-09-12 RX ADMIN — PREGABALIN 1000 MICROGRAM(S): 225 CAPSULE ORAL at 12:41

## 2022-09-12 RX ADMIN — Medication 2.5 MILLIGRAM(S): at 23:52

## 2022-09-12 RX ADMIN — ERTAPENEM SODIUM 120 MILLIGRAM(S): 1 INJECTION, POWDER, LYOPHILIZED, FOR SOLUTION INTRAMUSCULAR; INTRAVENOUS at 16:27

## 2022-09-12 RX ADMIN — MIRTAZAPINE 45 MILLIGRAM(S): 45 TABLET, ORALLY DISINTEGRATING ORAL at 23:52

## 2022-09-12 RX ADMIN — Medication 1 MILLIGRAM(S): at 12:41

## 2022-09-12 RX ADMIN — Medication 650 MILLIGRAM(S): at 04:44

## 2022-09-12 RX ADMIN — ROTIGOTINE 1 PATCH: 8 PATCH, EXTENDED RELEASE TRANSDERMAL at 23:47

## 2022-09-12 RX ADMIN — POLYETHYLENE GLYCOL 3350 17 GRAM(S): 17 POWDER, FOR SOLUTION ORAL at 12:41

## 2022-09-12 RX ADMIN — MIDODRINE HYDROCHLORIDE 5 MILLIGRAM(S): 2.5 TABLET ORAL at 17:47

## 2022-09-12 RX ADMIN — Medication 650 MILLIGRAM(S): at 09:22

## 2022-09-12 RX ADMIN — ENOXAPARIN SODIUM 70 MILLIGRAM(S): 100 INJECTION SUBCUTANEOUS at 17:47

## 2022-09-12 RX ADMIN — Medication 600 MILLIGRAM(S): at 06:05

## 2022-09-12 RX ADMIN — Medication 2.5 MILLIGRAM(S): at 16:26

## 2022-09-12 RX ADMIN — MIDODRINE HYDROCHLORIDE 5 MILLIGRAM(S): 2.5 TABLET ORAL at 12:41

## 2022-09-12 RX ADMIN — SERTRALINE 100 MILLIGRAM(S): 25 TABLET, FILM COATED ORAL at 12:40

## 2022-09-12 RX ADMIN — Medication 2.5 MILLIGRAM(S): at 06:05

## 2022-09-12 RX ADMIN — FAMOTIDINE 20 MILLIGRAM(S): 10 INJECTION INTRAVENOUS at 12:41

## 2022-09-12 RX ADMIN — MIDODRINE HYDROCHLORIDE 5 MILLIGRAM(S): 2.5 TABLET ORAL at 06:06

## 2022-09-12 RX ADMIN — ENOXAPARIN SODIUM 70 MILLIGRAM(S): 100 INJECTION SUBCUTANEOUS at 06:06

## 2022-09-12 RX ADMIN — Medication 650 MILLIGRAM(S): at 05:40

## 2022-09-12 NOTE — PROGRESS NOTE ADULT - ASSESSMENT
MATEO LESLIE is a 72y Female transferred from [facility] facility of lethargy and hypoxia.   Patient is a terminal case of PD.   9/6/22 patient has fever, Blood and urine cultures in progress.  ID and pulmonary on the case

## 2022-09-12 NOTE — CHART NOTE - NSCHARTNOTEFT_GEN_A_CORE
Assessment: 71 y/o female adm from [facility] with fever, hypoxic, lethargy. Pt is a terminal case of PD. PMH also consists of acute respiratory failure with hypoxia, depression, GERD, basal cell carcinoma, constipation.   Pt visited at bedside this am. Pt lying in bed, nonverbal. TF pump running at 70 cc/hr. Pt tolerating feeding. Last BM 9/10.     Factors impacting intake: [ ] none [ ] nausea  [ ] vomiting [ ] diarrhea [ ] constipation  [ ]chewing problems [ ] swallowing issues  [ ] other:     Diet Presciption: Diet, NPO with Tube Feed:   Tube Feeding Modality: Gastrostomy  Jevity 1.5  Total Volume for 24 Hours (mL): 1680  Continuous  Starting Tube Feed Rate {mL per Hour}: 70  Until Goal Tube Feed Rate (mL per Hour): 70  Tube Feed Duration (in Hours): 24  Tube Feed Start Time: 06:00  Tube Feed Stop Time: 00:00  Free Water Flush   Total Volume per Flush (mL): 100   Frequency: Every 4 Hours   Total Daily Volume of Flush (mL): 500 (09-10-22 @ 07:59)    Intake: TF providing pt with 2520 citlalli and 107 gms protein    Current Weight: 9/3 149.9# 9/10 144.2#   1+ edema to left/right arm  (3+ edema noted to B/L feet on admission ? reason for wt decrease)    % Weight Change    Pertinent Medications: MEDICATIONS  (STANDING):  baclofen 2.5 milliGRAM(s) Oral every 8 hours  cyanocobalamin 1000 MICROGram(s) Oral daily  Duopa Enteral Suspension 1 Bottle,carbidopa 4.63mg/levodopa 20mg per mL 72 mL/Day 72 mL/Day Enteral Tube Continuous Pump  enoxaparin Injectable 70 milliGRAM(s) SubCutaneous every 12 hours  ertapenem  IVPB 1000 milliGRAM(s) IV Intermittent every 24 hours  famotidine    Tablet 20 milliGRAM(s) Oral daily  folic acid 1 milliGRAM(s) Oral daily  influenza  Vaccine (HIGH DOSE) 0.7 milliLiter(s) IntraMuscular once  midodrine. 5 milliGRAM(s) Oral three times a day  mirtazapine 45 milliGRAM(s) Oral at bedtime  polyethylene glycol 3350 17 Gram(s) Oral daily  rotigotine patch 4 mG/24 Hr(s) 1 Patch Transdermal every 24 hours  sertraline 100 milliGRAM(s) Oral daily    MEDICATIONS  (PRN):  acetaminophen     Tablet .. 650 milliGRAM(s) Oral every 6 hours PRN Temp greater or equal to 38C (100.4F), Moderate Pain (4 - 6)  acetaminophen  Suppository .. 650 milliGRAM(s) Rectal every 4 hours PRN Temp greater or equal to 38C (100.4F)    Pertinent Labs: 09-12 Na149 mmol/L<H> Glu 110 mg/dL<H> K+ 4.6 mmol/L Cr  0.39 mg/dL<L> BUN 26 mg/dL<H> 09-08 Phos 2.2 mg/dL<L> 09-12 Alb 2.1 g/dL<L> 09-04 Chol 124 mg/dL LDL --    HDL 11 mg/dL<L> Trig 283 mg/dL<H>     CAPILLARY BLOOD GLUCOSE        Skin: Multiple DTIs    Estimated Needs:   [ x] no change since previous assessment:  4110-0007 citlalli; 107-120 gms protein; 1670-2004 cc free water    [ ] recalculated:     Previous Nutrition Diagnosis:   [ ] Inadequate Energy Intake [ ]Inadequate Oral Intake [ ] Excessive Energy Intake   [ ] Underweight [x ] Increased Nutrient Needs [ ] Overweight/Obesity   [ ] Altered GI Function [ ] Unintended Weight Loss [ ] Food & Nutrition Related Knowledge Deficit [ ] Malnutrition   [x] swallowing difficulty     Nutrition Diagnosis is [x ] ongoing  [ ] resolved [ ] not applicable     New Nutrition Diagnosis: [x ] not applicable       Interventions:   Recommend  [ ] Change Diet To:  [x ] Nutrition Supplement: consider ordering MVI daily and vit C 500 mg BID.   [x ] Nutrition Support: consider changing TF to Jevity 1.5 @ 70cc/hr x 16 hrs + prosource 30cc TID to provide pt with 1860 citlalli and 115 gms protein  [ ] Other:     Monitoring and Evaluation:   [ ] PO intake [ x ] Tolerance to diet prescription [ x ] weights [ x ] labs[ x ] follow up per protocol  [ ] other:
Called by rn for patient without diet order  - patient has peg tube in place  - no diet has been ordered for patient  - unable to obtain information on patients tube feeds from chart  - will order nutrition consult prior to starting tube feeds   - primary team to f/u
d dimer elev  pt is unable to have CTA with contrast - allergy to contrast as per   will attempt for VQ scan  LE doppler  Lovenox BID  will repeat D dimer

## 2022-09-12 NOTE — PROGRESS NOTE ADULT - SUBJECTIVE AND OBJECTIVE BOX
Patient is a 72y old  Female who presents with a chief complaint of Lethargic and hypoxic (06 Sep 2022 05:52)  9/8/22: Patient still had fever > 101    INTERVAL OVER NIGHT EVENTS:    MEDICATIONS  (STANDING):  baclofen 2.5 milliGRAM(s) Oral every 8 hours  cyanocobalamin 1000 MICROGram(s) Oral daily  Duopa Enteral Suspension 1 Bottle,carbidopa 4.63mg/levodopa 20mg per mL 72 mL/Day 72 mL/Day Enteral Tube Continuous Pump  enoxaparin Injectable 70 milliGRAM(s) SubCutaneous every 12 hours  ertapenem  IVPB 1000 milliGRAM(s) IV Intermittent every 24 hours  famotidine    Tablet 20 milliGRAM(s) Oral daily  folic acid 1 milliGRAM(s) Oral daily  influenza  Vaccine (HIGH DOSE) 0.7 milliLiter(s) IntraMuscular once  midodrine. 5 milliGRAM(s) Oral three times a day  mirtazapine 45 milliGRAM(s) Oral at bedtime  polyethylene glycol 3350 17 Gram(s) Oral daily  rotigotine patch 4 mG/24 Hr(s) 1 Patch Transdermal every 24 hours  sertraline 100 milliGRAM(s) Oral daily    MEDICATIONS  (PRN):  acetaminophen     Tablet .. 650 milliGRAM(s) Oral every 6 hours PRN Temp greater or equal to 38C (100.4F), Moderate Pain (4 - 6)    Allergies    Ceclor (Rash)  IV Contrast (Hypotension)  latex (Rash; Urticaria)    Intolerances    Vital Signs Last 24 Hrs  T(C): 38.3 (12 Sep 2022 05:42), Max: 38.3 (12 Sep 2022 04:38)  T(F): 101 (12 Sep 2022 05:42), Max: 101 (12 Sep 2022 04:38)  HR: 89 (12 Sep 2022 04:45) (87 - 92)  BP: 104/68 (12 Sep 2022 04:45) (99/61 - 107/65)  BP(mean): --  RR: 18 (12 Sep 2022 04:45) (16 - 18)  SpO2: 96% (12 Sep 2022 04:45) (96% - 96%)    Parameters below as of 11 Sep 2022 20:33  Patient On (Oxygen Delivery Method): nasal cannula        PHYSICAL EXAM:  GENERAL: Non verbal  HEAD:  Atraumatic, Normocephalic  EYES: PERRLA, conjunctiva and sclera clear  ENMT:  Moist mucous membranes, Good dentition, No lesions  NECK: Supple, No JVD, Normal thyroid  NERVOUS SYSTEM:   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  CHEST/LUNG: Clear to percussion bilaterally; No rales, rhonchi, wheezing, or rubs  HEART: Regular rate and rhythm; No murmurs, rubs, or gallops  ABDOMEN: Soft, Nondistended; Bowel sounds present.  GT in place  EXTREMITIES:  2+ Peripheral Pulses, No clubbing, cyanosis, or edema  LYMPH: No lymphadenopathy noted  SKIN: No rashes or lesions    LABS:                          9.5    19.73 )-----------( 475      ( 08 Sep 2022 06:40 )             31.4     08 Sep 2022 06:40    152    |  118    |  35     ----------------------------<  145    4.6     |  29     |  0.52     Ca    8.7        08 Sep 2022 06:40  Phos  2.2       08 Sep 2022 06:40  Mg     2.1       08 Sep 2022 06:40    TPro  6.8    /  Alb  2.0    /  TBili  0.2    /  DBili  x      /  AST  63     /  ALT  19     /  AlkPhos  135    08 Sep 2022 06:40    LIVER FUNCTIONS - ( 08 Sep 2022 06:40 )  Alb: 2.0 g/dL / Pro: 6.8 g/dL / ALK PHOS: 135 U/L / ALT: 19 U/L / AST: 63 U/L / GGT: x             CAPILLARY BLOOD GLUCOSE                        8.7    17.45 )-----------( 443      ( 07 Sep 2022 06:47 )             28.2     07 Sep 2022 06:47    152    |  117    |  31     ----------------------------<  180    3.9     |  30     |  0.50     Ca    8.4        07 Sep 2022 06:47  Mg     2.0       07 Sep 2022 06:47    TPro  6.8    /  Alb  2.2    /  TBili  0.5    /  DBili  x      /  AST  40     /  ALT  14     /  AlkPhos  193    06 Sep 2022 06:58    LIVER FUNCTIONS - ( 06 Sep 2022 06:58 )  Alb: 2.2 g/dL / Pro: 6.8 g/dL / ALK PHOS: 193 U/L / ALT: 14 U/L / AST: 40 U/L / GGT: x             CAPILLARY BLOOD GLUCOSE                                  8.1    14.02 )-----------( 294      ( 06 Sep 2022 00:07 )             25.6     09-05    140  |  105  |  57<H>  ----------------------------<  85  3.3<L>   |  25  |  0.65    Ca    8.8      05 Sep 2022 06:51  Phos  4.1     09-05  Mg     2.3     09-05    TPro  6.6  /  Alb  2.0<L>  /  TBili  0.6  /  DBili  x   /  AST  28  /  ALT  11<L>  /  AlkPhos  185<H>  09-05        CAPILLARY BLOOD GLUCOSE          RADIOLOGY & ADDITIONAL TESTS:    Imaging Personally Reviewed:  [x] YES  [ ] NO    Consultant(s) Notes Reviewed:  [x] YES  [ ] NO    Care Discussed with Consultants/Other Providers [x] YES  [ ] NO   Patient is a 72y old  Female who presents with a chief complaint of Lethargic and hypoxic (06 Sep 2022 05:52)  9/8/22: Patient still had fever > 101    INTERVAL OVER NIGHT EVENTS:    MEDICATIONS  (STANDING):  baclofen 2.5 milliGRAM(s) Oral every 8 hours  cyanocobalamin 1000 MICROGram(s) Oral daily  Duopa Enteral Suspension 1 Bottle,carbidopa 4.63mg/levodopa 20mg per mL 72 mL/Day 72 mL/Day Enteral Tube Continuous Pump  enoxaparin Injectable 70 milliGRAM(s) SubCutaneous every 12 hours  ertapenem  IVPB 1000 milliGRAM(s) IV Intermittent every 24 hours  famotidine    Tablet 20 milliGRAM(s) Oral daily  folic acid 1 milliGRAM(s) Oral daily  influenza  Vaccine (HIGH DOSE) 0.7 milliLiter(s) IntraMuscular once  midodrine. 5 milliGRAM(s) Oral three times a day  mirtazapine 45 milliGRAM(s) Oral at bedtime  polyethylene glycol 3350 17 Gram(s) Oral daily  rotigotine patch 4 mG/24 Hr(s) 1 Patch Transdermal every 24 hours  sertraline 100 milliGRAM(s) Oral daily    MEDICATIONS  (PRN):  acetaminophen     Tablet .. 650 milliGRAM(s) Oral every 6 hours PRN Temp greater or equal to 38C (100.4F), Moderate Pain (4 - 6)    Allergies    Ceclor (Rash)  IV Contrast (Hypotension)  latex (Rash; Urticaria)    Intolerances    Vital Signs Last 24 Hrs  T(C): 38.3 (12 Sep 2022 05:42), Max: 38.3 (12 Sep 2022 04:38)  T(F): 101 (12 Sep 2022 05:42), Max: 101 (12 Sep 2022 04:38)  HR: 89 (12 Sep 2022 04:45) (87 - 92)  BP: 104/68 (12 Sep 2022 04:45) (99/61 - 107/65)  BP(mean): --  RR: 18 (12 Sep 2022 04:45) (16 - 18)  SpO2: 96% (12 Sep 2022 04:45) (96% - 96%)    Parameters below as of 11 Sep 2022 20:33  Patient On (Oxygen Delivery Method): nasal cannula    PHYSICAL EXAM:  GENERAL: Non verbal  HEAD:  Atraumatic, Normocephalic  EYES: PERRLA, conjunctiva and sclera clear  ENMT:  Moist mucous membranes, Good dentition, No lesions  NECK: Supple, No JVD, Normal thyroid  NERVOUS SYSTEM:   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  CHEST/LUNG: Clear to percussion bilaterally; No rales, rhonchi, wheezing, or rubs  HEART: Regular rate and rhythm; No murmurs, rubs, or gallops  ABDOMEN: Soft, Nondistended; Bowel sounds present.  GT in place  EXTREMITIES:  2+ Peripheral Pulses, No clubbing, cyanosis, or edema  LYMPH: No lymphadenopathy noted  SKIN: No rashes or lesions    LABS:                        8.7    13.34 )-----------( 542      ( 12 Sep 2022 05:55 )             28.2     12 Sep 2022 05:55    149    |  115    |  26     ----------------------------<  110    4.6     |  29     |  0.39     Ca    8.5        12 Sep 2022 05:55  Mg     2.0       12 Sep 2022 05:55    TPro  6.3    /  Alb  2.1    /  TBili  0.2    /  DBili  x      /  AST  47     /  ALT  19     /  AlkPhos  95     12 Sep 2022 05:55    LIVER FUNCTIONS - ( 12 Sep 2022 05:55 )  Alb: 2.1 g/dL / Pro: 6.3 g/dL / ALK PHOS: 95 U/L / ALT: 19 U/L / AST: 47 U/L / GGT: x             CAPILLARY BLOOD GLUCOSE                          9.5    19.73 )-----------( 475      ( 08 Sep 2022 06:40 )             31.4     08 Sep 2022 06:40    152    |  118    |  35     ----------------------------<  145    4.6     |  29     |  0.52     Ca    8.7        08 Sep 2022 06:40  Phos  2.2       08 Sep 2022 06:40  Mg     2.1       08 Sep 2022 06:40    TPro  6.8    /  Alb  2.0    /  TBili  0.2    /  DBili  x      /  AST  63     /  ALT  19     /  AlkPhos  135    08 Sep 2022 06:40    LIVER FUNCTIONS - ( 08 Sep 2022 06:40 )  Alb: 2.0 g/dL / Pro: 6.8 g/dL / ALK PHOS: 135 U/L / ALT: 19 U/L / AST: 63 U/L / GGT: x             CAPILLARY BLOOD GLUCOSE                        8.7    17.45 )-----------( 443      ( 07 Sep 2022 06:47 )             28.2     07 Sep 2022 06:47    152    |  117    |  31     ----------------------------<  180    3.9     |  30     |  0.50     Ca    8.4        07 Sep 2022 06:47  Mg     2.0       07 Sep 2022 06:47    TPro  6.8    /  Alb  2.2    /  TBili  0.5    /  DBili  x      /  AST  40     /  ALT  14     /  AlkPhos  193    06 Sep 2022 06:58    LIVER FUNCTIONS - ( 06 Sep 2022 06:58 )  Alb: 2.2 g/dL / Pro: 6.8 g/dL / ALK PHOS: 193 U/L / ALT: 14 U/L / AST: 40 U/L / GGT: x             RADIOLOGY & ADDITIONAL TESTS:    Imaging Personally Reviewed:  [x] YES  [ ] NO    Consultant(s) Notes Reviewed:  [x] YES  [ ] NO    Care Discussed with Consultants/Other Providers [x] YES  [ ] NO

## 2022-09-12 NOTE — PROGRESS NOTE ADULT - SUBJECTIVE AND OBJECTIVE BOX
Date/Time Patient Seen:  		  Referring MD:   Data Reviewed	       Patient is a 72y old  Female who presents with a chief complaint of Lethargic and hypoxic (11 Sep 2022 19:09)      Subjective/HPI     PAST MEDICAL & SURGICAL HISTORY:  Constipation    Unilateral primary osteoarthritis, left hip    Parkinson&#x27;s disease    Basal cell carcinoma  scalp    GERD (gastroesophageal reflux disease)    Seasonal allergies    Depression    Acute respiratory failure with hypoxia    S/P deep brain stimulator placement  2006, 2 brain pacemakers Model #37363, batteries replaced 2014    History of   10/1982    H/O ovarian cystectomy  right,     H/O colonoscopy  , 2008 polypectomy, ,     Basal cell carcinoma  removal, mid vertex scalp, 2002    Abdominal hernia  with mesh, 2003          Medication list         MEDICATIONS  (STANDING):  baclofen 2.5 milliGRAM(s) Oral every 8 hours  cyanocobalamin 1000 MICROGram(s) Oral daily  Duopa Enteral Suspension 1 Bottle,carbidopa 4.63mg/levodopa 20mg per mL 72 mL/Day 72 mL/Day Enteral Tube Continuous Pump  enoxaparin Injectable 70 milliGRAM(s) SubCutaneous every 12 hours  ertapenem  IVPB 1000 milliGRAM(s) IV Intermittent every 24 hours  famotidine    Tablet 20 milliGRAM(s) Oral daily  folic acid 1 milliGRAM(s) Oral daily  influenza  Vaccine (HIGH DOSE) 0.7 milliLiter(s) IntraMuscular once  midodrine. 5 milliGRAM(s) Oral three times a day  mirtazapine 45 milliGRAM(s) Oral at bedtime  polyethylene glycol 3350 17 Gram(s) Oral daily  rotigotine patch 4 mG/24 Hr(s) 1 Patch Transdermal every 24 hours  sertraline 100 milliGRAM(s) Oral daily    MEDICATIONS  (PRN):  acetaminophen     Tablet .. 650 milliGRAM(s) Oral every 6 hours PRN Temp greater or equal to 38C (100.4F), Moderate Pain (4 - 6)         Vitals log        ICU Vital Signs Last 24 Hrs  T(C): 38.3 (12 Sep 2022 04:45), Max: 38.3 (12 Sep 2022 04:38)  T(F): 101 (12 Sep 2022 04:45), Max: 101 (12 Sep 2022 04:38)  HR: 89 (12 Sep 2022 04:45) (87 - 92)  BP: 104/68 (12 Sep 2022 04:45) (99/61 - 107/65)  BP(mean): --  ABP: --  ABP(mean): --  RR: 18 (12 Sep 2022 04:45) (16 - 18)  SpO2: 96% (12 Sep 2022 04:45) (96% - 96%)    O2 Parameters below as of 11 Sep 2022 20:33  Patient On (Oxygen Delivery Method): nasal cannula                 Input and Output:  I&O's Detail    10 Sep 2022 07:  -  11 Sep 2022 07:00  --------------------------------------------------------  IN:  Total IN: 0 mL    OUT:    Indwelling Catheter - Urethral (mL): 1650 mL  Total OUT: 1650 mL    Total NET: -1650 mL      11 Sep 2022 07:01  -  12 Sep 2022 05:40  --------------------------------------------------------  IN:    Free Water: 300 mL    Jevity 1.5: 1190 mL  Total IN: 1490 mL    OUT:    Indwelling Catheter - Urethral (mL): 600 mL    Voided (mL): 600 mL  Total OUT: 1200 mL    Total NET: 290 mL          Lab Data                  Review of Systems	      Objective     Physical Examination    heart s1s2  lung dec BS  abd soft      Pertinent Lab findings & Imaging      Yoel:  NO   Adequate UO     I&O's Detail    10 Sep 2022 07:01  -  11 Sep 2022 07:00  --------------------------------------------------------  IN:  Total IN: 0 mL    OUT:    Indwelling Catheter - Urethral (mL): 1650 mL  Total OUT: 1650 mL    Total NET: -1650 mL      11 Sep 2022 07:  -  12 Sep 2022 05:40  --------------------------------------------------------  IN:    Free Water: 300 mL    Jevity 1.5: 1190 mL  Total IN: 1490 mL    OUT:    Indwelling Catheter - Urethral (mL): 600 mL    Voided (mL): 600 mL  Total OUT: 1200 mL    Total NET: 290 mL               Discussed with:     Cultures:	        Radiology

## 2022-09-12 NOTE — PROGRESS NOTE ADULT - ASSESSMENT
[facility]  Pt well know to our group from care at [facility]     72y Female transferred from [facility] of lethargy and hypoxia.   Patient is a terminal case of PD.   At ED arrival her O2 sat was 72%.  Patient DNR/DNI started on 100% NRBM and her saturation improved.   Patient is non communicative most of informations received from [facility] facility and from spouse.  Patient has PEG/JT and on feeding and Parkinson's Disease medications.  Blood culture (collected 9/4 1am) Proteus species ESBL  Blood culture collected 9/6-NGTD    RECOMMENDATIONS  PT with acute respiratory distress and on imaging suggestion of LLL PNA. And now noted to have ESBL Proteus bacteremia  recommended meropenem first day 9/5 9/7 changed to ertapenem but noted elevated wbc but clinically appears slightly improved    -continue ertapenem 1 gram IV daily with last day 9/18    repeat blood cultures collected 9/6-NGTD  -ESBL proteus in urine so potential source as acute pyelonephritis with bacteremia    All interventions in keeping with goals of care     Thank you for consulting us and involving us in the management of this most interesting and challenging case.  We will follow along in the care of this patient. Please call us at [phone] or text me directly on my cell# at [phone] with any concerns.

## 2022-09-12 NOTE — PROGRESS NOTE ADULT - ASSESSMENT
72[facility].    PD  Hypoxemia  Effusion  elev D dimer  Frail  Weak  Elderly  OP  OA    VQ scan - unable to cooperate - on Lovenox full dose - LE doppler NEG  labs reviewed  on Invanz  Febrile overnight - diff dx - NMS - Drug fever - other -   ID follow up      ID eval in progress - Blood cx noted  labs and imaging reviewed  elev D dimer - contrast allergy cannot have CTA  HOB elev  asp prec  oral hygiene  pt has a PEG  pt has end stage Parkinson's disease - assist with needs - ADL - Fall prec - GOC - Pt is DNR  monitor VS and Sat  o2 support as needed - keep sat > 88 pct  prognosis guarded

## 2022-09-12 NOTE — PROGRESS NOTE ADULT - SUBJECTIVE AND OBJECTIVE BOX
[facility] DIVISION of INFECTIOUS DISEASE  Cash Saini MD PhD, Mya Romero MD, Deann Zamarripa MD, Baldev Haynes MD, Chong Johns MD  and providing coverage with Lucinda Reynoso MD  Providing Infectious Disease Consultations at [facility], [facility], [facility]'s    Office# [phone] to schedule follow up appointments  Answering Service for urgent calls or New Consults [phone]  Cell# to text for urgent issues Cash Saini [phone]     infectious diseases progress note:    MATEO LESLIE is a 72y y. o. Female patient    Overnight and events of the last 24hrs reviewed    Allergies    Ceclor (Rash)  IV Contrast (Hypotension)  latex (Rash; Urticaria)    Intolerances        ANTIBIOTICS/RELEVANT:  antimicrobials  ertapenem  IVPB 1000 milliGRAM(s) IV Intermittent every 24 hours    immunologic:  influenza  Vaccine (HIGH DOSE) 0.7 milliLiter(s) IntraMuscular once    OTHER:  acetaminophen     Tablet .. 650 milliGRAM(s) Oral every 6 hours PRN  acetaminophen  Suppository .. 650 milliGRAM(s) Rectal every 4 hours PRN  baclofen 2.5 milliGRAM(s) Oral every 8 hours  cyanocobalamin 1000 MICROGram(s) Oral daily  Duopa Enteral Suspension 1 Bottle,carbidopa 4.63mg/levodopa 20mg per mL 72 mL/Day 72 mL/Day Enteral Tube Continuous Pump  enoxaparin Injectable 70 milliGRAM(s) SubCutaneous every 12 hours  famotidine    Tablet 20 milliGRAM(s) Oral daily  folic acid 1 milliGRAM(s) Oral daily  midodrine. 5 milliGRAM(s) Oral three times a day  mirtazapine 45 milliGRAM(s) Oral at bedtime  polyethylene glycol 3350 17 Gram(s) Oral daily  rotigotine patch 4 mG/24 Hr(s) 1 Patch Transdermal every 24 hours  sertraline 100 milliGRAM(s) Oral daily      Objective:  Vital Signs Last 24 Hrs  T(C): 37.4 (12 Sep 2022 12:18), Max: 38.4 (12 Sep 2022 07:48)  T(F): 99.3 (12 Sep 2022 12:18), Max: 101.1 (12 Sep 2022 07:48)  HR: 88 (12 Sep 2022 12:18) (87 - 89)  BP: 112/51 (12 Sep 2022 12:18) (99/61 - 112/51)  BP(mean): --  RR: 20 (12 Sep 2022 12:18) (16 - 20)  SpO2: 94% (12 Sep 2022 12:18) (94% - 96%)    Parameters below as of 12 Sep 2022 12:18  Patient On (Oxygen Delivery Method): room air        T(C): 37.4 (09-12-22 @ 12:18), Max: 38.4 (09-12-22 @ 07:48)  T(C): 37.4 (09-12-22 @ 12:18), Max: 38.6 (09-10-22 @ 04:29)  T(C): 37.4 (09-12-22 @ 12:18), Max: 38.6 (09-10-22 @ 04:29)    PHYSICAL EXAM:  HEENT: NC atraumatic  Neck: supple  Respiratory: no accessory muscle use, breathing comfortably  Cardiovascular: distant  Gastrointestinal: normal appearing, nondistended  Extremities: no clubbing, no cyanosis,  Skin: sacral decube  Neuro: limited        LABS:                          8.7    13.34 )-----------( 542      ( 12 Sep 2022 05:55 )             28.2       WBC  13.34 09-12 @ 05:55  17.78 09-09 @ 07:29  19.73 09-08 @ 06:40  17.45 09-07 @ 06:47  14.02 09-06 @ 00:07      09-12    149<H>  |  115<H>  |  26<H>  ----------------------------<  110<H>  4.6   |  29  |  0.39<L>    Ca    8.5      12 Sep 2022 05:55  Mg     2.0     09-12    TPro  6.3  /  Alb  2.1<L>  /  TBili  0.2  /  DBili  x   /  AST  47<H>  /  ALT  19  /  AlkPhos  95  09-12      Creatinine, Serum: 0.39 mg/dL (09-12-22 @ 05:55)  Creatinine, Serum: 0.33 mg/dL (09-09-22 @ 07:29)  Creatinine, Serum: 0.52 mg/dL (09-08-22 @ 06:40)  Creatinine, Serum: 0.50 mg/dL (09-07-22 @ 06:47)  Creatinine, Serum: 0.56 mg/dL (09-06-22 @ 06:58)                INFLAMMATORY MARKERS  Auto Neutrophil #: 10.14 K/uL (09-12-22 @ 05:55)  Auto Lymphocyte #: 2.53 K/uL (09-12-22 @ 05:55)  Auto Neutrophil #: 13.07 K/uL (09-09-22 @ 07:29)  Auto Lymphocyte #: 2.63 K/uL (09-09-22 @ 07:29)  Auto Neutrophil #: 14.21 K/uL (09-08-22 @ 06:40)  Auto Lymphocyte #: 2.69 K/uL (09-08-22 @ 06:40)  Auto Neutrophil #: 12.51 K/uL (09-07-22 @ 06:47)  Auto Lymphocyte #: 2.29 K/uL (09-07-22 @ 06:47)  Auto Neutrophil #: 8.77 K/uL (09-05-22 @ 06:51)  Auto Lymphocyte #: 1.21 K/uL (09-05-22 @ 06:51)  Auto Neutrophil #: 8.59 K/uL (09-04-22 @ 06:24)  Auto Lymphocyte #: 1.16 K/uL (09-04-22 @ 06:24)  Auto Lymphocyte #: 1.49 K/uL (09-04-22 @ 01:03)  Auto Neutrophil #: 8.63 K/uL (09-04-22 @ 01:03)    Lactate, Blood: 1.9 mmol/L (09-04-22 @ 06:24)  Lactate, Blood: 2.7 mmol/L (09-04-22 @ 02:51)  Lactate, Blood: 2.8 mmol/L (09-04-22 @ 01:03)    Auto Eosinophil #: 0.00 K/uL (09-12-22 @ 05:55)  Auto Eosinophil #: 0.62 K/uL (09-09-22 @ 07:29)  Auto Eosinophil #: 0.70 K/uL (09-08-22 @ 06:40)  Auto Eosinophil #: 0.04 K/uL (09-07-22 @ 06:47)  Auto Eosinophil #: 0.13 K/uL (09-05-22 @ 06:51)  Auto Eosinophil #: 0.04 K/uL (09-04-22 @ 06:24)  Auto Eosinophil #: 0.00 K/uL (09-04-22 @ 01:03)                  Ferritin, Serum: 419 ng/mL (09-04-22 @ 02:51)        Activated Partial Thromboplastin Time: 26.9 sec (09-04-22 @ 01:03)  INR: 1.09 ratio (09-04-22 @ 01:03)    D-Dimer Assay, Quantitative: 1000 ng/mL DDU (09-06-22 @ 06:58)  D-Dimer Assay, Quantitative: 1731 ng/mL DDU (09-04-22 @ 08:42)        MICROBIOLOGY:              RADIOLOGY & ADDITIONAL STUDIES:

## 2022-09-12 NOTE — PROVIDER CONTACT NOTE (OTHER) - ASSESSMENT
the patient has a temp of 101.5 and needs medication orders
patient o2 sat 95% on 3l nasal canula
temperature has not gone done after administrating Tylenol
Rectal temp 101.0, Tylenol 650mg was given less than two hours ago
temp 101 rectal reduced to 100.5
temp 99.9 axillary, rectal temp taken 101.0

## 2022-09-12 NOTE — PROVIDER CONTACT NOTE (OTHER) - BACKGROUND
Patient was given liquid Tylenol a hour ago via peg for temp
history of fever
pt was given 650mg of Tyenol 1 hr ago for fever,
admitted for hypoxia
patient previously on non-rebreather

## 2022-09-13 RX ORDER — IBUPROFEN 200 MG
400 TABLET ORAL ONCE
Refills: 0 | Status: DISCONTINUED | OUTPATIENT
Start: 2022-09-13 | End: 2022-09-16

## 2022-09-13 RX ADMIN — ROTIGOTINE 1 PATCH: 8 PATCH, EXTENDED RELEASE TRANSDERMAL at 12:21

## 2022-09-13 RX ADMIN — FAMOTIDINE 20 MILLIGRAM(S): 10 INJECTION INTRAVENOUS at 12:17

## 2022-09-13 RX ADMIN — SERTRALINE 100 MILLIGRAM(S): 25 TABLET, FILM COATED ORAL at 12:17

## 2022-09-13 RX ADMIN — Medication 1 MILLIGRAM(S): at 12:17

## 2022-09-13 RX ADMIN — PREGABALIN 1000 MICROGRAM(S): 225 CAPSULE ORAL at 12:17

## 2022-09-13 RX ADMIN — MIDODRINE HYDROCHLORIDE 5 MILLIGRAM(S): 2.5 TABLET ORAL at 12:17

## 2022-09-13 RX ADMIN — Medication 650 MILLIGRAM(S): at 04:25

## 2022-09-13 RX ADMIN — POLYETHYLENE GLYCOL 3350 17 GRAM(S): 17 POWDER, FOR SOLUTION ORAL at 12:17

## 2022-09-13 RX ADMIN — ENOXAPARIN SODIUM 70 MILLIGRAM(S): 100 INJECTION SUBCUTANEOUS at 17:10

## 2022-09-13 RX ADMIN — MIRTAZAPINE 45 MILLIGRAM(S): 45 TABLET, ORALLY DISINTEGRATING ORAL at 22:19

## 2022-09-13 RX ADMIN — ENOXAPARIN SODIUM 70 MILLIGRAM(S): 100 INJECTION SUBCUTANEOUS at 05:53

## 2022-09-13 RX ADMIN — Medication 2.5 MILLIGRAM(S): at 15:47

## 2022-09-13 RX ADMIN — Medication 2.5 MILLIGRAM(S): at 05:54

## 2022-09-13 RX ADMIN — ROTIGOTINE 1 PATCH: 8 PATCH, EXTENDED RELEASE TRANSDERMAL at 19:45

## 2022-09-13 RX ADMIN — Medication 500 MILLIGRAM(S): at 05:54

## 2022-09-13 RX ADMIN — Medication 1 TABLET(S): at 12:23

## 2022-09-13 RX ADMIN — MIDODRINE HYDROCHLORIDE 5 MILLIGRAM(S): 2.5 TABLET ORAL at 05:54

## 2022-09-13 RX ADMIN — MIDODRINE HYDROCHLORIDE 5 MILLIGRAM(S): 2.5 TABLET ORAL at 17:11

## 2022-09-13 RX ADMIN — Medication 500 MILLIGRAM(S): at 17:10

## 2022-09-13 RX ADMIN — Medication 2.5 MILLIGRAM(S): at 22:19

## 2022-09-13 RX ADMIN — ERTAPENEM SODIUM 120 MILLIGRAM(S): 1 INJECTION, POWDER, LYOPHILIZED, FOR SOLUTION INTRAMUSCULAR; INTRAVENOUS at 15:48

## 2022-09-13 NOTE — PROGRESS NOTE ADULT - ASSESSMENT
[facility]  Pt well know to our group from care at [facility]     72y Female transferred from [facility] facility of lethargy and hypoxia.   Patient is a terminal case of PD.   At ED arrival her O2 sat was 72%.  Patient DNR/DNI started on 100% NRBM and her saturation improved.   Patient is non communicative most of informations received from [facility] facility and from spouse.  Patient has PEG/JT and on feeding and Parkinson's Disease medications.  Blood culture (collected 9/4 1am) Proteus species ESBL  Blood culture collected 9/6-NGTD    RECOMMENDATIONS  PT with acute respiratory distress and on imaging suggestion of LLL PNA. And now noted to have ESBL Proteus bacteremia  recommended meropenem first day 9/5 9/7 changed to ertapenem but noted elevated wbc but clinically appears slightly improved    -continue ertapenem 1 gram IV daily with last day 9/18 and fine to complete outside hospital setting per Id    repeat blood cultures collected 9/6-NGTD  -ESBL proteus in urine so potential source as acute pyelonephritis with bacteremia    All interventions in keeping with goals of care     Thank you for consulting us and involving us in the management of this most interesting and challenging case.  We will follow along in the care of this patient. Please call us at [phone] or text me directly on my cell# at [phone] with any concerns.

## 2022-09-13 NOTE — PROVIDER CONTACT NOTE (OTHER) - REASON
Starting feeds/ IV fluids
rectal temp 101
rectal temp 101.1
rectal temp of Fever 101.0
rectal temp of 101.5
temp 101.2
Need order for nasal Canula
rectal temp of 101.0
temp 101.7
follow up for fever
fever 101

## 2022-09-13 NOTE — PROVIDER CONTACT NOTE (OTHER) - ACTION/TREATMENT ORDERED:
awaiting further orders.
pt already on antibiotics and being followed by ID.  No  IVF ordered at this time
MD will put in order for Motrin and to apply ice pack to patient
The md gave orders for motrin 200mg via peg tube
per MD give PRN Tylenol 650mg
md will look over the chart and order meds for temp
per md administer rectal Tylenol
MD states she will review the chart and order additional medication
administer Tylenol 650mg prn order and re-assess in 1 hr
Dr. Redmond with put in order for nasal canula

## 2022-09-13 NOTE — PROGRESS NOTE ADULT - ASSESSMENT
MATEO LESLIE is a 72y Female transferred from [facility] facility of lethargy and hypoxia.   Patient is a terminal case of PD.   9/6/22 patient has fever, Blood and urine cultures in progress.  ID and pulmonary on the case

## 2022-09-13 NOTE — PROGRESS NOTE ADULT - ASSESSMENT
72[facility]    PD  Hypoxemia  Effusion  elev D dimer  Frail  Weak  Elderly  OP  OA    VQ scan - unable to cooperate - on Lovenox full dose - LE doppler NEG  labs reviewed  on Invanz  Febrile overnight - diff dx - NMS - Drug fever - other -   ID follow up  NSAID added for PRN use      ID eval in progress - Blood cx noted  labs and imaging reviewed  elev D dimer - contrast allergy cannot have CTA  HOB elev  asp prec  oral hygiene  pt has a PEG  pt has end stage Parkinson's disease - assist with needs - ADL - Fall prec - GOC - Pt is DNR  monitor VS and Sat  o2 support as needed - keep sat > 88 pct  prognosis guarded

## 2022-09-13 NOTE — PROGRESS NOTE ADULT - SUBJECTIVE AND OBJECTIVE BOX
[facility] DIVISION of INFECTIOUS DISEASE  Cash Saini MD PhD, Mya Romero MD, Deann Zamarripa MD, Baldev Haynes MD, Chong Johns MD  and providing coverage with Lucinda Reynoso MD  Providing Infectious Disease Consultations at [facility], [facility], [facility]'s    Office# [phone] to schedule follow up appointments  Answering Service for urgent calls or New Consults [phone]  Cell# to text for urgent issues Cash Saini [phone]     infectious diseases progress note:    MATEO LESLIE is a 72y y. o. Female patient    Overnight and events of the last 24hrs reviewed    Allergies    Ceclor (Rash)  IV Contrast (Hypotension)  latex (Rash; Urticaria)    Intolerances        ANTIBIOTICS/RELEVANT:  antimicrobials  ertapenem  IVPB 1000 milliGRAM(s) IV Intermittent every 24 hours    immunologic:  influenza  Vaccine (HIGH DOSE) 0.7 milliLiter(s) IntraMuscular once    OTHER:  acetaminophen     Tablet .. 650 milliGRAM(s) Oral every 6 hours PRN  acetaminophen  Suppository .. 650 milliGRAM(s) Rectal every 4 hours PRN  ascorbic acid 500 milliGRAM(s) Oral two times a day  baclofen 2.5 milliGRAM(s) Oral every 8 hours  cyanocobalamin 1000 MICROGram(s) Oral daily  Duopa Enteral Suspension 1 Bottle,carbidopa 4.63mg/levodopa 20mg per mL 72 mL/Day 72 mL/Day Enteral Tube Continuous Pump  enoxaparin Injectable 70 milliGRAM(s) SubCutaneous every 12 hours  famotidine    Tablet 20 milliGRAM(s) Oral daily  folic acid 1 milliGRAM(s) Oral daily  ibuprofen  Tablet. 400 milliGRAM(s) Oral once PRN  midodrine. 5 milliGRAM(s) Oral three times a day  mirtazapine 45 milliGRAM(s) Oral at bedtime  multivitamin 1 Tablet(s) Oral daily  polyethylene glycol 3350 17 Gram(s) Oral daily  rotigotine patch 4 mG/24 Hr(s) 1 Patch Transdermal every 24 hours  sertraline 100 milliGRAM(s) Oral daily      Objective:  Vital Signs Last 24 Hrs  T(C): 37.2 (13 Sep 2022 05:00), Max: 38.4 (13 Sep 2022 04:19)  T(F): 98.9 (13 Sep 2022 05:00), Max: 101.1 (13 Sep 2022 04:19)  HR: 90 (13 Sep 2022 05:00) (88 - 90)  BP: 100/63 (13 Sep 2022 05:00) (100/61 - 112/51)  BP(mean): --  RR: 18 (13 Sep 2022 05:00) (18 - 20)  SpO2: 95% (13 Sep 2022 05:00) (93% - 96%)    Parameters below as of 13 Sep 2022 05:00  Patient On (Oxygen Delivery Method): room air        T(C): 37.2 (09-13-22 @ 05:00), Max: 38.4 (09-12-22 @ 07:48)  T(C): 37.2 (09-13-22 @ 05:00), Max: 38.4 (09-12-22 @ 07:48)  T(C): 37.2 (09-13-22 @ 05:00), Max: 38.6 (09-10-22 @ 04:29)    PHYSICAL EXAM:  HEENT: NC atraumatic  Neck: supple  Respiratory: no accessory muscle use, breathing comfortably  Cardiovascular: distant  Gastrointestinal: normal appearing, nondistended  Extremities: no clubbing, no cyanosis,        LABS:                          8.7    13.34 )-----------( 542      ( 12 Sep 2022 05:55 )             28.2       WBC  13.34 09-12 @ 05:55  17.78 09-09 @ 07:29  19.73 09-08 @ 06:40  17.45 09-07 @ 06:47      09-12    149<H>  |  115<H>  |  26<H>  ----------------------------<  110<H>  4.6   |  29  |  0.39<L>    Ca    8.5      12 Sep 2022 05:55  Mg     2.0     09-12    TPro  6.3  /  Alb  2.1<L>  /  TBili  0.2  /  DBili  x   /  AST  47<H>  /  ALT  19  /  AlkPhos  95  09-12      Creatinine, Serum: 0.39 mg/dL (09-12-22 @ 05:55)  Creatinine, Serum: 0.33 mg/dL (09-09-22 @ 07:29)  Creatinine, Serum: 0.52 mg/dL (09-08-22 @ 06:40)  Creatinine, Serum: 0.50 mg/dL (09-07-22 @ 06:47)                INFLAMMATORY MARKERS  Auto Neutrophil #: 10.14 K/uL (09-12-22 @ 05:55)  Auto Lymphocyte #: 2.53 K/uL (09-12-22 @ 05:55)  Auto Neutrophil #: 13.07 K/uL (09-09-22 @ 07:29)  Auto Lymphocyte #: 2.63 K/uL (09-09-22 @ 07:29)  Auto Neutrophil #: 14.21 K/uL (09-08-22 @ 06:40)  Auto Lymphocyte #: 2.69 K/uL (09-08-22 @ 06:40)  Auto Neutrophil #: 12.51 K/uL (09-07-22 @ 06:47)  Auto Lymphocyte #: 2.29 K/uL (09-07-22 @ 06:47)  Auto Neutrophil #: 8.77 K/uL (09-05-22 @ 06:51)  Auto Lymphocyte #: 1.21 K/uL (09-05-22 @ 06:51)  Auto Neutrophil #: 8.59 K/uL (09-04-22 @ 06:24)  Auto Lymphocyte #: 1.16 K/uL (09-04-22 @ 06:24)  Auto Lymphocyte #: 1.49 K/uL (09-04-22 @ 01:03)  Auto Neutrophil #: 8.63 K/uL (09-04-22 @ 01:03)    Lactate, Blood: 1.9 mmol/L (09-04-22 @ 06:24)  Lactate, Blood: 2.7 mmol/L (09-04-22 @ 02:51)  Lactate, Blood: 2.8 mmol/L (09-04-22 @ 01:03)    Auto Eosinophil #: 0.00 K/uL (09-12-22 @ 05:55)  Auto Eosinophil #: 0.62 K/uL (09-09-22 @ 07:29)  Auto Eosinophil #: 0.70 K/uL (09-08-22 @ 06:40)  Auto Eosinophil #: 0.04 K/uL (09-07-22 @ 06:47)  Auto Eosinophil #: 0.13 K/uL (09-05-22 @ 06:51)  Auto Eosinophil #: 0.04 K/uL (09-04-22 @ 06:24)  Auto Eosinophil #: 0.00 K/uL (09-04-22 @ 01:03)                  Ferritin, Serum: 419 ng/mL (09-04-22 @ 02:51)        Activated Partial Thromboplastin Time: 26.9 sec (09-04-22 @ 01:03)  INR: 1.09 ratio (09-04-22 @ 01:03)    D-Dimer Assay, Quantitative: 1000 ng/mL DDU (09-06-22 @ 06:58)  D-Dimer Assay, Quantitative: 1731 ng/mL DDU (09-04-22 @ 08:42)        MICROBIOLOGY:              RADIOLOGY & ADDITIONAL STUDIES:

## 2022-09-13 NOTE — PROGRESS NOTE ADULT - SUBJECTIVE AND OBJECTIVE BOX
Patient is a 72y old  Female who presents with a chief complaint of Lethargic and hypoxic (06 Sep 2022 05:52)  9/8/22: Patient still had fever > 101    INTERVAL OVER NIGHT EVENTS:    MEDICATIONS  (STANDING):  ascorbic acid 500 milliGRAM(s) Oral two times a day  baclofen 2.5 milliGRAM(s) Oral every 8 hours  cyanocobalamin 1000 MICROGram(s) Oral daily  Duopa Enteral Suspension 1 Bottle,carbidopa 4.63mg/levodopa 20mg per mL 72 mL/Day 72 mL/Day Enteral Tube Continuous Pump  enoxaparin Injectable 70 milliGRAM(s) SubCutaneous every 12 hours  ertapenem  IVPB 1000 milliGRAM(s) IV Intermittent every 24 hours  famotidine    Tablet 20 milliGRAM(s) Oral daily  folic acid 1 milliGRAM(s) Oral daily  influenza  Vaccine (HIGH DOSE) 0.7 milliLiter(s) IntraMuscular once  midodrine. 5 milliGRAM(s) Oral three times a day  mirtazapine 45 milliGRAM(s) Oral at bedtime  multivitamin 1 Tablet(s) Oral daily  polyethylene glycol 3350 17 Gram(s) Oral daily  rotigotine patch 4 mG/24 Hr(s) 1 Patch Transdermal every 24 hours  sertraline 100 milliGRAM(s) Oral daily    MEDICATIONS  (PRN):  acetaminophen     Tablet .. 650 milliGRAM(s) Oral every 6 hours PRN Temp greater or equal to 38C (100.4F), Moderate Pain (4 - 6)  acetaminophen  Suppository .. 650 milliGRAM(s) Rectal every 4 hours PRN Temp greater or equal to 38C (100.4F)  ibuprofen  Tablet. 400 milliGRAM(s) Oral once PRN Temp greater or equal to 38C (100.4F)    Allergies    Ceclor (Rash)  IV Contrast (Hypotension)  latex (Rash; Urticaria)    Intolerances    Vital Signs Last 24 Hrs  T(C): 36.5 (13 Sep 2022 20:26), Max: 38.4 (13 Sep 2022 04:19)  T(F): 97.7 (13 Sep 2022 20:26), Max: 101.1 (13 Sep 2022 04:19)  HR: 85 (13 Sep 2022 20:26) (85 - 90)  BP: 100/61 (13 Sep 2022 20:26) (100/61 - 111/63)  BP(mean): --  RR: 18 (13 Sep 2022 20:26) (18 - 19)  SpO2: 95% (13 Sep 2022 20:26) (93% - 95%)    Parameters below as of 13 Sep 2022 20:26  Patient On (Oxygen Delivery Method): nasal cannula    PHYSICAL EXAM:  GENERAL: Non verbal  HEAD:  Atraumatic, Normocephalic  EYES: PERRLA, conjunctiva and sclera clear  ENMT:  Moist mucous membranes, Good dentition, No lesions  NECK: Supple, No JVD, Normal thyroid  NERVOUS SYSTEM:   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  CHEST/LUNG: Clear to percussion bilaterally; No rales, rhonchi, wheezing, or rubs  HEART: Regular rate and rhythm; No murmurs, rubs, or gallops  ABDOMEN: Soft, Nondistended; Bowel sounds present.  GT in place  EXTREMITIES:  2+ Peripheral Pulses, No clubbing, cyanosis, or edema  LYMPH: No lymphadenopathy noted  SKIN: No rashes or lesions    LABS:                        8.7    13.34 )-----------( 542      ( 12 Sep 2022 05:55 )             28.2     12 Sep 2022 05:55    149    |  115    |  26     ----------------------------<  110    4.6     |  29     |  0.39     Ca    8.5        12 Sep 2022 05:55  Mg     2.0       12 Sep 2022 05:55    TPro  6.3    /  Alb  2.1    /  TBili  0.2    /  DBili  x      /  AST  47     /  ALT  19     /  AlkPhos  95     12 Sep 2022 05:55    LIVER FUNCTIONS - ( 12 Sep 2022 05:55 )  Alb: 2.1 g/dL / Pro: 6.3 g/dL / ALK PHOS: 95 U/L / ALT: 19 U/L / AST: 47 U/L / GGT: x             RADIOLOGY & ADDITIONAL TESTS:    Imaging Personally Reviewed:  [x] YES  [ ] NO    Consultant(s) Notes Reviewed:  [x] YES  [ ] NO    Care Discussed with Consultants/Other Providers [x] YES  [ ] NO

## 2022-09-13 NOTE — PROVIDER CONTACT NOTE (OTHER) - SITUATION
The patient is tolerating Nasal Canula 3L with o2 sat 95%
pt febrile earlier
The patient has a rectal temp of 101.7
The patient has a rectal fever of 101
rectal temp of 101.0 f
Pt admitted for AMS, hypoxia, has positive blood cultures- Came in with PEG tube, jejunotomy tube
Rectal temp of 101.1
rectal temp 101.2
Rectal temp is 101.5 Tyenol prn for temp is ordered every 6 hrs. last does was given at 0052
re-assessment temp is still 101 rectally

## 2022-09-13 NOTE — PROGRESS NOTE ADULT - SUBJECTIVE AND OBJECTIVE BOX
Date/Time Patient Seen:  		  Referring MD:   Data Reviewed	       Patient is a 72y old  Female who presents with a chief complaint of Lethargic and hypoxic (12 Sep 2022 14:50)      Subjective/HPI     PAST MEDICAL & SURGICAL HISTORY:  Constipation    Unilateral primary osteoarthritis, left hip    Parkinson&#x27;s disease    Basal cell carcinoma  scalp    GERD (gastroesophageal reflux disease)    Seasonal allergies    Depression    Acute respiratory failure with hypoxia    S/P deep brain stimulator placement  2006, 2 brain pacemakers Model #86505, batteries replaced 2014    History of   10/1982    H/O ovarian cystectomy  right,     H/O colonoscopy  , 2008 polypectomy, ,     Basal cell carcinoma  removal, mid vertex scalp, 2002    Abdominal hernia  with mesh, 2003          Medication list         MEDICATIONS  (STANDING):  ascorbic acid 500 milliGRAM(s) Oral two times a day  baclofen 2.5 milliGRAM(s) Oral every 8 hours  cyanocobalamin 1000 MICROGram(s) Oral daily  Duopa Enteral Suspension 1 Bottle,carbidopa 4.63mg/levodopa 20mg per mL 72 mL/Day 72 mL/Day Enteral Tube Continuous Pump  enoxaparin Injectable 70 milliGRAM(s) SubCutaneous every 12 hours  ertapenem  IVPB 1000 milliGRAM(s) IV Intermittent every 24 hours  famotidine    Tablet 20 milliGRAM(s) Oral daily  folic acid 1 milliGRAM(s) Oral daily  influenza  Vaccine (HIGH DOSE) 0.7 milliLiter(s) IntraMuscular once  midodrine. 5 milliGRAM(s) Oral three times a day  mirtazapine 45 milliGRAM(s) Oral at bedtime  multivitamin 1 Tablet(s) Oral daily  polyethylene glycol 3350 17 Gram(s) Oral daily  rotigotine patch 4 mG/24 Hr(s) 1 Patch Transdermal every 24 hours  sertraline 100 milliGRAM(s) Oral daily    MEDICATIONS  (PRN):  acetaminophen     Tablet .. 650 milliGRAM(s) Oral every 6 hours PRN Temp greater or equal to 38C (100.4F), Moderate Pain (4 - 6)  acetaminophen  Suppository .. 650 milliGRAM(s) Rectal every 4 hours PRN Temp greater or equal to 38C (100.4F)  ibuprofen  Tablet. 400 milliGRAM(s) Oral once PRN Temp greater or equal to 38C (100.4F)         Vitals log        ICU Vital Signs Last 24 Hrs  T(C): 37.2 (13 Sep 2022 05:00), Max: 38.4 (12 Sep 2022 07:48)  T(F): 98.9 (13 Sep 2022 05:00), Max: 101.1 (12 Sep 2022 07:48)  HR: 90 (13 Sep 2022 05:00) (88 - 90)  BP: 100/63 (13 Sep 2022 05:00) (100/61 - 112/51)  BP(mean): --  ABP: --  ABP(mean): --  RR: 18 (13 Sep 2022 05:00) (18 - 20)  SpO2: 95% (13 Sep 2022 05:00) (93% - 96%)    O2 Parameters below as of 13 Sep 2022 05:00  Patient On (Oxygen Delivery Method): room air                 Input and Output:  I&O's Detail    11 Sep 2022 07:01  -  12 Sep 2022 07:00  --------------------------------------------------------  IN:    Free Water: 300 mL    Jevity 1.5: 1190 mL  Total IN: 1490 mL    OUT:    Indwelling Catheter - Urethral (mL): 600 mL    Voided (mL): 600 mL  Total OUT: 1200 mL    Total NET: 290 mL      12 Sep 2022 07:01  -  13 Sep 2022 05:54  --------------------------------------------------------  IN:    Jevity 1.5: 840 mL  Total IN: 840 mL    OUT:    Indwelling Catheter - Urethral (mL): 1850 mL  Total OUT: 1850 mL    Total NET: -1010 mL          Lab Data                        8.7    13.34 )-----------( 542      ( 12 Sep 2022 05:55 )             28.2     09-12    149<H>  |  115<H>  |  26<H>  ----------------------------<  110<H>  4.6   |  29  |  0.39<L>    Ca    8.5      12 Sep 2022 05:55  Mg     2.0         TPro  6.3  /  Alb  2.1<L>  /  TBili  0.2  /  DBili  x   /  AST  47<H>  /  ALT  19  /  AlkPhos  95              Review of Systems	      Objective     Physical Examination    heart s1s2  lung dc BS  abd soft  head nc      Pertinent Lab findings & Imaging      Yoel:  NO   Adequate UO     I&O's Detail    11 Sep 2022 07:01  -  12 Sep 2022 07:00  --------------------------------------------------------  IN:    Free Water: 300 mL    Jevity 1.5: 1190 mL  Total IN: 1490 mL    OUT:    Indwelling Catheter - Urethral (mL): 600 mL    Voided (mL): 600 mL  Total OUT: 1200 mL    Total NET: 290 mL      12 Sep 2022 07:01  -  13 Sep 2022 05:54  --------------------------------------------------------  IN:    Jevity 1.5: 840 mL  Total IN: 840 mL    OUT:    Indwelling Catheter - Urethral (mL): 1850 mL  Total OUT: 1850 mL    Total NET: -1010 mL               Discussed with:     Cultures:	        Radiology

## 2022-09-13 NOTE — PROVIDER CONTACT NOTE (OTHER) - NAME OF MD/NP/PA/DO NOTIFIED:
Dr. Durand
Dr. Garcia
Dr. Garcias
Dr. Durand
Dr. Porras
Dr CHARIS Long
Dr. Garcias
Dr. Porras
Roman Redmond
Dr. Garcia

## 2022-09-13 NOTE — PROVIDER CONTACT NOTE (OTHER) - DATE AND TIME:
06-Sep-2022 00:45
07-Sep-2022 22:11
05-Sep-2022 21:04
13-Sep-2022 04:23
06-Sep-2022
06-Sep-2022 05:06
08-Sep-2022 00:13
12-Sep-2022 04:40
05-Sep-2022 10:24
12-Sep-2022 05:43
06-Sep-2022 04:00

## 2022-09-14 LAB
ANION GAP SERPL CALC-SCNC: 7 MMOL/L — SIGNIFICANT CHANGE UP (ref 5–17)
BASOPHILS # BLD AUTO: 0.07 K/UL — SIGNIFICANT CHANGE UP (ref 0–0.2)
BASOPHILS NFR BLD AUTO: 0.6 % — SIGNIFICANT CHANGE UP (ref 0–2)
BUN SERPL-MCNC: 24 MG/DL — HIGH (ref 7–23)
CALCIUM SERPL-MCNC: 8.7 MG/DL — SIGNIFICANT CHANGE UP (ref 8.5–10.1)
CHLORIDE SERPL-SCNC: 113 MMOL/L — HIGH (ref 96–108)
CO2 SERPL-SCNC: 28 MMOL/L — SIGNIFICANT CHANGE UP (ref 22–31)
CREAT SERPL-MCNC: 0.31 MG/DL — LOW (ref 0.5–1.3)
EGFR: 110 ML/MIN/1.73M2 — SIGNIFICANT CHANGE UP
EOSINOPHIL # BLD AUTO: 0.29 K/UL — SIGNIFICANT CHANGE UP (ref 0–0.5)
EOSINOPHIL NFR BLD AUTO: 2.5 % — SIGNIFICANT CHANGE UP (ref 0–6)
GLUCOSE SERPL-MCNC: 94 MG/DL — SIGNIFICANT CHANGE UP (ref 70–99)
HCT VFR BLD CALC: 29 % — LOW (ref 34.5–45)
HGB BLD-MCNC: 8.8 G/DL — LOW (ref 11.5–15.5)
IMM GRANULOCYTES NFR BLD AUTO: 1.4 % — SIGNIFICANT CHANGE UP (ref 0–1.5)
LYMPHOCYTES # BLD AUTO: 19.6 % — SIGNIFICANT CHANGE UP (ref 13–44)
LYMPHOCYTES # BLD AUTO: 2.31 K/UL — SIGNIFICANT CHANGE UP (ref 1–3.3)
MAGNESIUM SERPL-MCNC: 2 MG/DL — SIGNIFICANT CHANGE UP (ref 1.6–2.6)
MCHC RBC-ENTMCNC: 26.7 PG — LOW (ref 27–34)
MCHC RBC-ENTMCNC: 30.3 GM/DL — LOW (ref 32–36)
MCV RBC AUTO: 88.1 FL — SIGNIFICANT CHANGE UP (ref 80–100)
MONOCYTES # BLD AUTO: 0.66 K/UL — SIGNIFICANT CHANGE UP (ref 0–0.9)
MONOCYTES NFR BLD AUTO: 5.6 % — SIGNIFICANT CHANGE UP (ref 2–14)
NEUTROPHILS # BLD AUTO: 8.28 K/UL — HIGH (ref 1.8–7.4)
NEUTROPHILS NFR BLD AUTO: 70.3 % — SIGNIFICANT CHANGE UP (ref 43–77)
NRBC # BLD: 0 /100 WBCS — SIGNIFICANT CHANGE UP (ref 0–0)
PLATELET # BLD AUTO: 523 K/UL — HIGH (ref 150–400)
POTASSIUM SERPL-MCNC: 4.5 MMOL/L — SIGNIFICANT CHANGE UP (ref 3.5–5.3)
POTASSIUM SERPL-SCNC: 4.5 MMOL/L — SIGNIFICANT CHANGE UP (ref 3.5–5.3)
RBC # BLD: 3.29 M/UL — LOW (ref 3.8–5.2)
RBC # FLD: 18.6 % — HIGH (ref 10.3–14.5)
SODIUM SERPL-SCNC: 148 MMOL/L — HIGH (ref 135–145)
WBC # BLD: 11.78 K/UL — HIGH (ref 3.8–10.5)
WBC # FLD AUTO: 11.78 K/UL — HIGH (ref 3.8–10.5)

## 2022-09-14 PROCEDURE — 71045 X-RAY EXAM CHEST 1 VIEW: CPT | Mod: 26

## 2022-09-14 RX ORDER — MEROPENEM 1 G/30ML
1000 INJECTION INTRAVENOUS EVERY 8 HOURS
Refills: 0 | Status: DISCONTINUED | OUTPATIENT
Start: 2022-09-14 | End: 2022-09-16

## 2022-09-14 RX ADMIN — ENOXAPARIN SODIUM 70 MILLIGRAM(S): 100 INJECTION SUBCUTANEOUS at 17:13

## 2022-09-14 RX ADMIN — Medication 650 MILLIGRAM(S): at 10:08

## 2022-09-14 RX ADMIN — Medication 650 MILLIGRAM(S): at 10:35

## 2022-09-14 RX ADMIN — POLYETHYLENE GLYCOL 3350 17 GRAM(S): 17 POWDER, FOR SOLUTION ORAL at 11:40

## 2022-09-14 RX ADMIN — Medication 2.5 MILLIGRAM(S): at 22:50

## 2022-09-14 RX ADMIN — ENOXAPARIN SODIUM 70 MILLIGRAM(S): 100 INJECTION SUBCUTANEOUS at 06:40

## 2022-09-14 RX ADMIN — MIRTAZAPINE 45 MILLIGRAM(S): 45 TABLET, ORALLY DISINTEGRATING ORAL at 22:50

## 2022-09-14 RX ADMIN — SERTRALINE 100 MILLIGRAM(S): 25 TABLET, FILM COATED ORAL at 11:40

## 2022-09-14 RX ADMIN — Medication 2.5 MILLIGRAM(S): at 13:20

## 2022-09-14 RX ADMIN — Medication 500 MILLIGRAM(S): at 06:40

## 2022-09-14 RX ADMIN — PREGABALIN 1000 MICROGRAM(S): 225 CAPSULE ORAL at 11:40

## 2022-09-14 RX ADMIN — MIDODRINE HYDROCHLORIDE 5 MILLIGRAM(S): 2.5 TABLET ORAL at 11:40

## 2022-09-14 RX ADMIN — ROTIGOTINE 1 PATCH: 8 PATCH, EXTENDED RELEASE TRANSDERMAL at 10:38

## 2022-09-14 RX ADMIN — MEROPENEM 100 MILLIGRAM(S): 1 INJECTION INTRAVENOUS at 22:49

## 2022-09-14 RX ADMIN — MIDODRINE HYDROCHLORIDE 5 MILLIGRAM(S): 2.5 TABLET ORAL at 17:13

## 2022-09-14 RX ADMIN — Medication 1 MILLIGRAM(S): at 11:40

## 2022-09-14 RX ADMIN — ROTIGOTINE 1 PATCH: 8 PATCH, EXTENDED RELEASE TRANSDERMAL at 19:37

## 2022-09-14 RX ADMIN — FAMOTIDINE 20 MILLIGRAM(S): 10 INJECTION INTRAVENOUS at 11:40

## 2022-09-14 RX ADMIN — MEROPENEM 100 MILLIGRAM(S): 1 INJECTION INTRAVENOUS at 16:28

## 2022-09-14 RX ADMIN — MIDODRINE HYDROCHLORIDE 5 MILLIGRAM(S): 2.5 TABLET ORAL at 06:40

## 2022-09-14 RX ADMIN — Medication 500 MILLIGRAM(S): at 17:12

## 2022-09-14 RX ADMIN — Medication 2.5 MILLIGRAM(S): at 06:39

## 2022-09-14 RX ADMIN — Medication 1 TABLET(S): at 11:40

## 2022-09-14 RX ADMIN — ROTIGOTINE 1 PATCH: 8 PATCH, EXTENDED RELEASE TRANSDERMAL at 12:48

## 2022-09-14 NOTE — PROGRESS NOTE ADULT - ASSESSMENT
MATEO LESLIE is a 72y Female transferred from [facility] facility of lethargy and hypoxia.   Patient is a terminal case of PD.   9/6/22 patient has fever, Blood and urine cultures in progress.  ID and pulmonary on the case

## 2022-09-14 NOTE — PROGRESS NOTE ADULT - SUBJECTIVE AND OBJECTIVE BOX
Patient is a 72y old  Female who presents with a chief complaint of Lethargic and hypoxic (06 Sep 2022 05:52)  9/8/22: Patient still had fever > 101    INTERVAL OVER NIGHT EVENTS:    MEDICATIONS  (STANDING):  ascorbic acid 500 milliGRAM(s) Oral two times a day  baclofen 2.5 milliGRAM(s) Oral every 8 hours  cyanocobalamin 1000 MICROGram(s) Oral daily  Duopa Enteral Suspension 1 Bottle,carbidopa 4.63mg/levodopa 20mg per mL 72 mL/Day 72 mL/Day Enteral Tube Continuous Pump  enoxaparin Injectable 70 milliGRAM(s) SubCutaneous every 12 hours  ertapenem  IVPB 1000 milliGRAM(s) IV Intermittent every 24 hours  famotidine    Tablet 20 milliGRAM(s) Oral daily  folic acid 1 milliGRAM(s) Oral daily  influenza  Vaccine (HIGH DOSE) 0.7 milliLiter(s) IntraMuscular once  midodrine. 5 milliGRAM(s) Oral three times a day  mirtazapine 45 milliGRAM(s) Oral at bedtime  multivitamin 1 Tablet(s) Oral daily  polyethylene glycol 3350 17 Gram(s) Oral daily  rotigotine patch 4 mG/24 Hr(s) 1 Patch Transdermal every 24 hours  sertraline 100 milliGRAM(s) Oral daily    MEDICATIONS  (PRN):  acetaminophen     Tablet .. 650 milliGRAM(s) Oral every 6 hours PRN Temp greater or equal to 38C (100.4F), Moderate Pain (4 - 6)  acetaminophen  Suppository .. 650 milliGRAM(s) Rectal every 4 hours PRN Temp greater or equal to 38C (100.4F)  ibuprofen  Tablet. 400 milliGRAM(s) Oral once PRN Temp greater or equal to 38C (100.4F)    Allergies    Ceclor (Rash)  IV Contrast (Hypotension)  latex (Rash; Urticaria)    Intolerances    Vital Signs Last 24 Hrs  T(C): 37.4 (14 Sep 2022 12:23), Max: 38.5 (14 Sep 2022 10:06)  T(F): 99.4 (14 Sep 2022 12:23), Max: 101.3 (14 Sep 2022 10:06)  HR: 88 (14 Sep 2022 12:23) (85 - 88)  BP: 101/63 (14 Sep 2022 12:23) (100/61 - 101/63)  BP(mean): --  RR: 20 (14 Sep 2022 12:23) (18 - 20)  SpO2: 95% (14 Sep 2022 12:23) (95% - 96%)    Parameters below as of 14 Sep 2022 12:23  Patient On (Oxygen Delivery Method): room air        Parameters below as of 13 Sep 2022 20:26  Patient On (Oxygen Delivery Method): nasal cannula    PHYSICAL EXAM:  GENERAL: Non verbal  HEAD:  Atraumatic, Normocephalic  EYES: PERRLA, conjunctiva and sclera clear  ENMT:  Moist mucous membranes, Good dentition, No lesions  NECK: Supple, No JVD, Normal thyroid  NERVOUS SYSTEM:   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  CHEST/LUNG: Clear to percussion bilaterally; No rales, rhonchi, wheezing, or rubs  HEART: Regular rate and rhythm; No murmurs, rubs, or gallops  ABDOMEN: Soft, Nondistended; Bowel sounds present.  GT in place  EXTREMITIES:  2+ Peripheral Pulses, No clubbing, cyanosis, or edema  LYMPH: No lymphadenopathy noted  SKIN: No rashes or lesions    LABS:                        8.8    11.78 )-----------( 523      ( 14 Sep 2022 06:55 )             29.0     14 Sep 2022 06:55    148    |  113    |  24     ----------------------------<  94     4.5     |  28     |  0.31     Ca    8.7        14 Sep 2022 06:55  Mg     2.0       14 Sep 2022 06:55    CAPILLARY BLOOD GLUCOSE                        8.7    13.34 )-----------( 542      ( 12 Sep 2022 05:55 )             28.2     12 Sep 2022 05:55    149    |  115    |  26     ----------------------------<  110    4.6     |  29     |  0.39     Ca    8.5        12 Sep 2022 05:55  Mg     2.0       12 Sep 2022 05:55    TPro  6.3    /  Alb  2.1    /  TBili  0.2    /  DBili  x      /  AST  47     /  ALT  19     /  AlkPhos  95     12 Sep 2022 05:55    LIVER FUNCTIONS - ( 12 Sep 2022 05:55 )  Alb: 2.1 g/dL / Pro: 6.3 g/dL / ALK PHOS: 95 U/L / ALT: 19 U/L / AST: 47 U/L / GGT: x             RADIOLOGY & ADDITIONAL TESTS:    Imaging Personally Reviewed:  [x] YES  [ ] NO    Consultant(s) Notes Reviewed:  [x] YES  [ ] NO    Care Discussed with Consultants/Other Providers [x] YES  [ ] NO

## 2022-09-14 NOTE — PROGRESS NOTE ADULT - ASSESSMENT
72y Female transferred from [facility] facility of letharg[facility] and hypoxia.    PD  Hypoxemia  Effusion  elev D dimer  Frail  Weak  Elderly  OP  OA    afebrile overnight - vs noted - INVANZ - ID follow up  cm follow up reviewed  on AC     ID eval in progress - Blood cx noted  labs and imaging reviewed  elev D dimer - contrast allergy cannot have CTA  HOB elev  asp prec  oral hygiene  pt has a PEG  pt has end stage Parkinson's disease - assist with needs - ADL - Fall prec - GOC - Pt is DNR  monitor VS and Sat  o2 support as needed - keep sat > 88 pct  prognosis guarded

## 2022-09-14 NOTE — PROGRESS NOTE ADULT - ASSESSMENT
[facility] Infectious Diseases  Pt well know to our group from care at [facility]     72y Female transferred from [facility] facility of lethargy and hypoxia.   Patient is a terminal case of PD.   At ED arrival her O2 sat was 72%.  Patient DNR/DNI started on 100% NRBM and her saturation improved.   Patient is non communicative most of informations received from [facility] facility and from spouse.  Patient has PEG/JT and on feeding and Parkinson's Disease medications.  Blood culture (collected 9/4 1am) Proteus species ESBL  Blood culture collected 9/6-NGTD    RECOMMENDATIONS  PT with acute respiratory distress and on imaging suggestion of LLL PNA. And now noted to have ESBL Proteus bacteremia  recommended meropenem first day 9/5 9/7 changed to ertapenem but noted elevated wbc but clinically appears slightly improved  repeat blood cultures collected 9/6-NGTD  -ESBL proteus in urine so potential source as acute pyelonephritis with bacteremia    9/14-pt now febrile - will escalate to meropenem with hope to still have 9/18 as last day    All interventions in keeping with goals of care     Thank you for consulting us and involving us in the management of this most interesting and challenging case.  We will follow along in the care of this patient. Please call us at [phone] or text me directly on my cell# at [phone] with any concerns.

## 2022-09-14 NOTE — PROGRESS NOTE ADULT - SUBJECTIVE AND OBJECTIVE BOX
[facility] DIVISION of INFECTIOUS DISEASE  Cash Saini MD PhD, Mya Romero MD, Deann Zamarripa MD, Baldev Haynes MD, Chong Johns MD  and providing coverage with Lucinda Reynoso MD  Providing Infectious Disease Consultations at [facility], [facility], [facility]'s    Office# [phone] to schedule follow up appointments  Answering Service for urgent calls or New Consults [phone]  Cell# to text for urgent issues Cash Saini [phone]     infectious diseases progress note:    MATEO LESLIE is a 72y y. o. Female patient    Overnight and events of the last 24hrs reviewed    Allergies    Ceclor (Rash)  IV Contrast (Hypotension)  latex (Rash; Urticaria)    Intolerances        ANTIBIOTICS/RELEVANT:  antimicrobials  ertapenem  IVPB 1000 milliGRAM(s) IV Intermittent every 24 hours    immunologic:  influenza  Vaccine (HIGH DOSE) 0.7 milliLiter(s) IntraMuscular once    OTHER:  acetaminophen     Tablet .. 650 milliGRAM(s) Oral every 6 hours PRN  acetaminophen  Suppository .. 650 milliGRAM(s) Rectal every 4 hours PRN  ascorbic acid 500 milliGRAM(s) Oral two times a day  baclofen 2.5 milliGRAM(s) Oral every 8 hours  cyanocobalamin 1000 MICROGram(s) Oral daily  Duopa Enteral Suspension 1 Bottle,carbidopa 4.63mg/levodopa 20mg per mL 72 mL/Day 72 mL/Day Enteral Tube Continuous Pump  enoxaparin Injectable 70 milliGRAM(s) SubCutaneous every 12 hours  famotidine    Tablet 20 milliGRAM(s) Oral daily  folic acid 1 milliGRAM(s) Oral daily  ibuprofen  Tablet. 400 milliGRAM(s) Oral once PRN  midodrine. 5 milliGRAM(s) Oral three times a day  mirtazapine 45 milliGRAM(s) Oral at bedtime  multivitamin 1 Tablet(s) Oral daily  polyethylene glycol 3350 17 Gram(s) Oral daily  rotigotine patch 4 mG/24 Hr(s) 1 Patch Transdermal every 24 hours  sertraline 100 milliGRAM(s) Oral daily      Objective:  Vital Signs Last 24 Hrs  T(C): 37.4 (14 Sep 2022 12:23), Max: 38.5 (14 Sep 2022 10:06)  T(F): 99.4 (14 Sep 2022 12:23), Max: 101.3 (14 Sep 2022 10:06)  HR: 88 (14 Sep 2022 12:23) (85 - 88)  BP: 101/63 (14 Sep 2022 12:23) (100/61 - 101/63)  BP(mean): --  RR: 20 (14 Sep 2022 12:23) (18 - 20)  SpO2: 95% (14 Sep 2022 12:23) (95% - 96%)    Parameters below as of 14 Sep 2022 12:23  Patient On (Oxygen Delivery Method): room air        T(C): 37.4 (09-14-22 @ 12:23), Max: 38.5 (09-14-22 @ 10:06)  T(C): 37.4 (09-14-22 @ 12:23), Max: 38.5 (09-14-22 @ 10:06)  T(C): 37.4 (09-14-22 @ 12:23), Max: 38.5 (09-14-22 @ 10:06)    PHYSICAL EXAM:  HEENT: NC atraumatic  Neck: supple  Respiratory: no accessory muscle use, breathing comfortably  Cardiovascular: distant  Gastrointestinal: normal appearing, nondistended  Extremities: no clubbing, no cyanosis,        LABS:                          8.8    11.78 )-----------( 523      ( 14 Sep 2022 06:55 )             29.0       WBC  11.78 09-14 @ 06:55  13.34 09-12 @ 05:55  17.78 09-09 @ 07:29  19.73 09-08 @ 06:40      09-14    148<H>  |  113<H>  |  24<H>  ----------------------------<  94  4.5   |  28  |  0.31<L>    Ca    8.7      14 Sep 2022 06:55  Mg     2.0     09-14        Creatinine, Serum: 0.31 mg/dL (09-14-22 @ 06:55)  Creatinine, Serum: 0.39 mg/dL (09-12-22 @ 05:55)  Creatinine, Serum: 0.33 mg/dL (09-09-22 @ 07:29)  Creatinine, Serum: 0.52 mg/dL (09-08-22 @ 06:40)                INFLAMMATORY MARKERS  Auto Neutrophil #: 8.28 K/uL (09-14-22 @ 06:55)  Auto Lymphocyte #: 2.31 K/uL (09-14-22 @ 06:55)  Auto Neutrophil #: 10.14 K/uL (09-12-22 @ 05:55)  Auto Lymphocyte #: 2.53 K/uL (09-12-22 @ 05:55)  Auto Neutrophil #: 13.07 K/uL (09-09-22 @ 07:29)  Auto Lymphocyte #: 2.63 K/uL (09-09-22 @ 07:29)  Auto Neutrophil #: 14.21 K/uL (09-08-22 @ 06:40)  Auto Lymphocyte #: 2.69 K/uL (09-08-22 @ 06:40)  Auto Neutrophil #: 12.51 K/uL (09-07-22 @ 06:47)  Auto Lymphocyte #: 2.29 K/uL (09-07-22 @ 06:47)  Auto Neutrophil #: 8.77 K/uL (09-05-22 @ 06:51)  Auto Lymphocyte #: 1.21 K/uL (09-05-22 @ 06:51)  Auto Neutrophil #: 8.59 K/uL (09-04-22 @ 06:24)  Auto Lymphocyte #: 1.16 K/uL (09-04-22 @ 06:24)  Auto Lymphocyte #: 1.49 K/uL (09-04-22 @ 01:03)  Auto Neutrophil #: 8.63 K/uL (09-04-22 @ 01:03)    Lactate, Blood: 1.9 mmol/L (09-04-22 @ 06:24)  Lactate, Blood: 2.7 mmol/L (09-04-22 @ 02:51)  Lactate, Blood: 2.8 mmol/L (09-04-22 @ 01:03)    Auto Eosinophil #: 0.29 K/uL (09-14-22 @ 06:55)  Auto Eosinophil #: 0.00 K/uL (09-12-22 @ 05:55)  Auto Eosinophil #: 0.62 K/uL (09-09-22 @ 07:29)  Auto Eosinophil #: 0.70 K/uL (09-08-22 @ 06:40)  Auto Eosinophil #: 0.04 K/uL (09-07-22 @ 06:47)  Auto Eosinophil #: 0.13 K/uL (09-05-22 @ 06:51)  Auto Eosinophil #: 0.04 K/uL (09-04-22 @ 06:24)  Auto Eosinophil #: 0.00 K/uL (09-04-22 @ 01:03)                  Ferritin, Serum: 419 ng/mL (09-04-22 @ 02:51)        Activated Partial Thromboplastin Time: 26.9 sec (09-04-22 @ 01:03)  INR: 1.09 ratio (09-04-22 @ 01:03)    D-Dimer Assay, Quantitative: 1000 ng/mL DDU (09-06-22 @ 06:58)  D-Dimer Assay, Quantitative: 1731 ng/mL DDU (09-04-22 @ 08:42)        MICROBIOLOGY:              RADIOLOGY & ADDITIONAL STUDIES:

## 2022-09-14 NOTE — PROGRESS NOTE ADULT - SUBJECTIVE AND OBJECTIVE BOX
Date/Time Patient Seen:  		  Referring MD:   Data Reviewed	       Patient is a 72y old  Female who presents with a chief complaint of Lethargic and hypoxic (13 Sep 2022 19:26)      Subjective/HPI     PAST MEDICAL & SURGICAL HISTORY:  Constipation    Unilateral primary osteoarthritis, left hip    Parkinson&#x27;s disease    Basal cell carcinoma  scalp    GERD (gastroesophageal reflux disease)    Seasonal allergies    Depression    Acute respiratory failure with hypoxia    S/P deep brain stimulator placement  2006, 2 brain pacemakers Model #06318, batteries replaced 2014    History of   10/1982    H/O ovarian cystectomy  right,     H/O colonoscopy  , 2008 polypectomy, ,     Basal cell carcinoma  removal, mid vertex scalp, 2002    Abdominal hernia  with mesh, 2003          Medication list         MEDICATIONS  (STANDING):  ascorbic acid 500 milliGRAM(s) Oral two times a day  baclofen 2.5 milliGRAM(s) Oral every 8 hours  cyanocobalamin 1000 MICROGram(s) Oral daily  Duopa Enteral Suspension 1 Bottle,carbidopa 4.63mg/levodopa 20mg per mL 72 mL/Day 72 mL/Day Enteral Tube Continuous Pump  enoxaparin Injectable 70 milliGRAM(s) SubCutaneous every 12 hours  ertapenem  IVPB 1000 milliGRAM(s) IV Intermittent every 24 hours  famotidine    Tablet 20 milliGRAM(s) Oral daily  folic acid 1 milliGRAM(s) Oral daily  influenza  Vaccine (HIGH DOSE) 0.7 milliLiter(s) IntraMuscular once  midodrine. 5 milliGRAM(s) Oral three times a day  mirtazapine 45 milliGRAM(s) Oral at bedtime  multivitamin 1 Tablet(s) Oral daily  polyethylene glycol 3350 17 Gram(s) Oral daily  rotigotine patch 4 mG/24 Hr(s) 1 Patch Transdermal every 24 hours  sertraline 100 milliGRAM(s) Oral daily    MEDICATIONS  (PRN):  acetaminophen     Tablet .. 650 milliGRAM(s) Oral every 6 hours PRN Temp greater or equal to 38C (100.4F), Moderate Pain (4 - 6)  acetaminophen  Suppository .. 650 milliGRAM(s) Rectal every 4 hours PRN Temp greater or equal to 38C (100.4F)  ibuprofen  Tablet. 400 milliGRAM(s) Oral once PRN Temp greater or equal to 38C (100.4F)         Vitals log        ICU Vital Signs Last 24 Hrs  T(C): 37.4 (14 Sep 2022 04:44), Max: 37.6 (13 Sep 2022 12:11)  T(F): 99.4 (14 Sep 2022 04:44), Max: 99.7 (13 Sep 2022 12:11)  HR: 85 (14 Sep 2022 04:44) (85 - 90)  BP: 100/67 (14 Sep 2022 04:44) (100/61 - 111/63)  BP(mean): --  ABP: --  ABP(mean): --  RR: 18 (14 Sep 2022 04:44) (18 - 18)  SpO2: 96% (14 Sep 2022 04:44) (95% - 96%)    O2 Parameters below as of 14 Sep 2022 04:44  Patient On (Oxygen Delivery Method): nasal cannula                 Input and Output:  I&O's Detail    12 Sep 2022 07:01  -  13 Sep 2022 07:00  --------------------------------------------------------  IN:    Jevity 1.5: 840 mL  Total IN: 840 mL    OUT:    Indwelling Catheter - Urethral (mL): 1850 mL  Total OUT: 1850 mL    Total NET: -1010 mL      13 Sep 2022 07:01  -  14 Sep 2022 05:33  --------------------------------------------------------  IN:  Total IN: 0 mL    OUT:    Indwelling Catheter - Urethral (mL): 900 mL  Total OUT: 900 mL    Total NET: -900 mL          Lab Data                        8.7    13.34 )-----------( 542      ( 12 Sep 2022 05:55 )             28.2     09-12    149<H>  |  115<H>  |  26<H>  ----------------------------<  110<H>  4.6   |  29  |  0.39<L>    Ca    8.5      12 Sep 2022 05:55  Mg     2.0         TPro  6.3  /  Alb  2.1<L>  /  TBili  0.2  /  DBili  x   /  AST  47<H>  /  ALT  19  /  AlkPhos  95              Review of Systems	      Objective     Physical Examination  heart s1s2  lung dec BS  abd soft        Pertinent Lab findings & Imaging      Yoel:  NO   Adequate UO     I&O's Detail    12 Sep 2022 07:01  -  13 Sep 2022 07:00  --------------------------------------------------------  IN:    Jevity 1.5: 840 mL  Total IN: 840 mL    OUT:    Indwelling Catheter - Urethral (mL): 1850 mL  Total OUT: 1850 mL    Total NET: -1010 mL      13 Sep 2022 07:01  -  14 Sep 2022 05:33  --------------------------------------------------------  IN:  Total IN: 0 mL    OUT:    Indwelling Catheter - Urethral (mL): 900 mL  Total OUT: 900 mL    Total NET: -900 mL               Discussed with:     Cultures:	        Radiology

## 2022-09-15 ENCOUNTER — TRANSCRIPTION ENCOUNTER (OUTPATIENT)
Age: 72
End: 2022-09-15

## 2022-09-15 LAB
ANION GAP SERPL CALC-SCNC: 5 MMOL/L — SIGNIFICANT CHANGE UP (ref 5–17)
BASOPHILS # BLD AUTO: 0.05 K/UL — SIGNIFICANT CHANGE UP (ref 0–0.2)
BASOPHILS NFR BLD AUTO: 0.4 % — SIGNIFICANT CHANGE UP (ref 0–2)
BUN SERPL-MCNC: 36 MG/DL — HIGH (ref 7–23)
CALCIUM SERPL-MCNC: 8.9 MG/DL — SIGNIFICANT CHANGE UP (ref 8.5–10.1)
CHLORIDE SERPL-SCNC: 113 MMOL/L — HIGH (ref 96–108)
CO2 SERPL-SCNC: 29 MMOL/L — SIGNIFICANT CHANGE UP (ref 22–31)
CREAT SERPL-MCNC: 0.38 MG/DL — LOW (ref 0.5–1.3)
EGFR: 105 ML/MIN/1.73M2 — SIGNIFICANT CHANGE UP
EOSINOPHIL # BLD AUTO: 0.28 K/UL — SIGNIFICANT CHANGE UP (ref 0–0.5)
EOSINOPHIL NFR BLD AUTO: 2.4 % — SIGNIFICANT CHANGE UP (ref 0–6)
GLUCOSE SERPL-MCNC: 128 MG/DL — HIGH (ref 70–99)
HCT VFR BLD CALC: 30.2 % — LOW (ref 34.5–45)
HGB BLD-MCNC: 8.9 G/DL — LOW (ref 11.5–15.5)
IMM GRANULOCYTES NFR BLD AUTO: 0.9 % — SIGNIFICANT CHANGE UP (ref 0–1.5)
LYMPHOCYTES # BLD AUTO: 19.4 % — SIGNIFICANT CHANGE UP (ref 13–44)
LYMPHOCYTES # BLD AUTO: 2.27 K/UL — SIGNIFICANT CHANGE UP (ref 1–3.3)
MCHC RBC-ENTMCNC: 26 PG — LOW (ref 27–34)
MCHC RBC-ENTMCNC: 29.5 GM/DL — LOW (ref 32–36)
MCV RBC AUTO: 88.3 FL — SIGNIFICANT CHANGE UP (ref 80–100)
MONOCYTES # BLD AUTO: 0.51 K/UL — SIGNIFICANT CHANGE UP (ref 0–0.9)
MONOCYTES NFR BLD AUTO: 4.4 % — SIGNIFICANT CHANGE UP (ref 2–14)
NEUTROPHILS # BLD AUTO: 8.48 K/UL — HIGH (ref 1.8–7.4)
NEUTROPHILS NFR BLD AUTO: 72.5 % — SIGNIFICANT CHANGE UP (ref 43–77)
NRBC # BLD: 0 /100 WBCS — SIGNIFICANT CHANGE UP (ref 0–0)
PLATELET # BLD AUTO: 531 K/UL — HIGH (ref 150–400)
POTASSIUM SERPL-MCNC: 3.9 MMOL/L — SIGNIFICANT CHANGE UP (ref 3.5–5.3)
POTASSIUM SERPL-SCNC: 3.9 MMOL/L — SIGNIFICANT CHANGE UP (ref 3.5–5.3)
RBC # BLD: 3.42 M/UL — LOW (ref 3.8–5.2)
RBC # FLD: 18.9 % — HIGH (ref 10.3–14.5)
SODIUM SERPL-SCNC: 147 MMOL/L — HIGH (ref 135–145)
WBC # BLD: 11.69 K/UL — HIGH (ref 3.8–10.5)
WBC # FLD AUTO: 11.69 K/UL — HIGH (ref 3.8–10.5)

## 2022-09-15 RX ORDER — POLYETHYLENE GLYCOL 3350 17 G/17G
17 POWDER, FOR SOLUTION ORAL
Qty: 0 | Refills: 0 | DISCHARGE
Start: 2022-09-15

## 2022-09-15 RX ORDER — BACLOFEN 100 %
2.5 POWDER (GRAM) MISCELLANEOUS
Qty: 0 | Refills: 0 | DISCHARGE
Start: 2022-09-15

## 2022-09-15 RX ORDER — ASCORBIC ACID 60 MG
1 TABLET,CHEWABLE ORAL
Qty: 0 | Refills: 0 | DISCHARGE
Start: 2022-09-15

## 2022-09-15 RX ORDER — BACLOFEN 100 %
1 POWDER (GRAM) MISCELLANEOUS
Qty: 0 | Refills: 0 | DISCHARGE

## 2022-09-15 RX ORDER — ROTIGOTINE 8 MG/24H
1 PATCH, EXTENDED RELEASE TRANSDERMAL
Qty: 0 | Refills: 0 | DISCHARGE

## 2022-09-15 RX ADMIN — POLYETHYLENE GLYCOL 3350 17 GRAM(S): 17 POWDER, FOR SOLUTION ORAL at 11:49

## 2022-09-15 RX ADMIN — Medication 500 MILLIGRAM(S): at 05:55

## 2022-09-15 RX ADMIN — PREGABALIN 1000 MICROGRAM(S): 225 CAPSULE ORAL at 11:48

## 2022-09-15 RX ADMIN — FAMOTIDINE 20 MILLIGRAM(S): 10 INJECTION INTRAVENOUS at 11:48

## 2022-09-15 RX ADMIN — Medication 2.5 MILLIGRAM(S): at 05:55

## 2022-09-15 RX ADMIN — ENOXAPARIN SODIUM 70 MILLIGRAM(S): 100 INJECTION SUBCUTANEOUS at 17:09

## 2022-09-15 RX ADMIN — SERTRALINE 100 MILLIGRAM(S): 25 TABLET, FILM COATED ORAL at 11:48

## 2022-09-15 RX ADMIN — MIDODRINE HYDROCHLORIDE 5 MILLIGRAM(S): 2.5 TABLET ORAL at 11:48

## 2022-09-15 RX ADMIN — ENOXAPARIN SODIUM 70 MILLIGRAM(S): 100 INJECTION SUBCUTANEOUS at 05:55

## 2022-09-15 RX ADMIN — MEROPENEM 100 MILLIGRAM(S): 1 INJECTION INTRAVENOUS at 22:46

## 2022-09-15 RX ADMIN — Medication 500 MILLIGRAM(S): at 17:09

## 2022-09-15 RX ADMIN — Medication 1 MILLIGRAM(S): at 11:48

## 2022-09-15 RX ADMIN — Medication 2.5 MILLIGRAM(S): at 13:06

## 2022-09-15 RX ADMIN — MIDODRINE HYDROCHLORIDE 5 MILLIGRAM(S): 2.5 TABLET ORAL at 05:54

## 2022-09-15 RX ADMIN — Medication 1 TABLET(S): at 11:48

## 2022-09-15 RX ADMIN — MIRTAZAPINE 45 MILLIGRAM(S): 45 TABLET, ORALLY DISINTEGRATING ORAL at 22:51

## 2022-09-15 RX ADMIN — ROTIGOTINE 1 PATCH: 8 PATCH, EXTENDED RELEASE TRANSDERMAL at 11:46

## 2022-09-15 RX ADMIN — MEROPENEM 100 MILLIGRAM(S): 1 INJECTION INTRAVENOUS at 13:06

## 2022-09-15 RX ADMIN — MEROPENEM 100 MILLIGRAM(S): 1 INJECTION INTRAVENOUS at 05:55

## 2022-09-15 RX ADMIN — ROTIGOTINE 1 PATCH: 8 PATCH, EXTENDED RELEASE TRANSDERMAL at 19:56

## 2022-09-15 RX ADMIN — MIDODRINE HYDROCHLORIDE 5 MILLIGRAM(S): 2.5 TABLET ORAL at 17:09

## 2022-09-15 RX ADMIN — Medication 2.5 MILLIGRAM(S): at 22:46

## 2022-09-15 NOTE — PROGRESS NOTE ADULT - ASSESSMENT
72[facility].    PD  Hypoxemia  Effusion  elev D dimer  Frail  Weak  Elderly  OP  OA    cont fever trend  ABX changed to Meropenem  ID follow up    ID eval in progress - Blood cx noted  labs and imaging reviewed  elev D dimer - contrast allergy cannot have CTA  HOB elev  asp prec  oral hygiene  pt has a PEG  pt has end stage Parkinson's disease - assist with needs - ADL - Fall prec - GOC - Pt is DNR  monitor VS and Sat  o2 support as needed - keep sat > 88 pct  prognosis guarded

## 2022-09-15 NOTE — PROGRESS NOTE ADULT - SUBJECTIVE AND OBJECTIVE BOX
Date/Time Patient Seen:  		  Referring MD:   Data Reviewed	       Patient is a 72y old  Female who presents with a chief complaint of Lethargic and hypoxic (14 Sep 2022 19:01)      Subjective/HPI     PAST MEDICAL & SURGICAL HISTORY:  Constipation    Unilateral primary osteoarthritis, left hip    Parkinson&#x27;s disease    Basal cell carcinoma  scalp    GERD (gastroesophageal reflux disease)    Seasonal allergies    Depression    Acute respiratory failure with hypoxia    S/P deep brain stimulator placement  2006, 2 brain pacemakers Model #70440, batteries replaced 2014    History of   10/1982    H/O ovarian cystectomy  right,     H/O colonoscopy  , 2008 polypectomy, ,     Basal cell carcinoma  removal, mid vertex scalp, 2002    Abdominal hernia  with mesh, 2003          Medication list         MEDICATIONS  (STANDING):  ascorbic acid 500 milliGRAM(s) Oral two times a day  baclofen 2.5 milliGRAM(s) Oral every 8 hours  cyanocobalamin 1000 MICROGram(s) Oral daily  Duopa Enteral Suspension 1 Bottle,carbidopa 4.63mg/levodopa 20mg per mL 72 mL/Day 72 mL/Day Enteral Tube Continuous Pump  enoxaparin Injectable 70 milliGRAM(s) SubCutaneous every 12 hours  famotidine    Tablet 20 milliGRAM(s) Oral daily  folic acid 1 milliGRAM(s) Oral daily  influenza  Vaccine (HIGH DOSE) 0.7 milliLiter(s) IntraMuscular once  meropenem  IVPB 1000 milliGRAM(s) IV Intermittent every 8 hours  midodrine. 5 milliGRAM(s) Oral three times a day  mirtazapine 45 milliGRAM(s) Oral at bedtime  multivitamin 1 Tablet(s) Oral daily  polyethylene glycol 3350 17 Gram(s) Oral daily  rotigotine patch 4 mG/24 Hr(s) 1 Patch Transdermal every 24 hours  sertraline 100 milliGRAM(s) Oral daily    MEDICATIONS  (PRN):  acetaminophen     Tablet .. 650 milliGRAM(s) Oral every 6 hours PRN Temp greater or equal to 38C (100.4F), Moderate Pain (4 - 6)  acetaminophen  Suppository .. 650 milliGRAM(s) Rectal every 4 hours PRN Temp greater or equal to 38C (100.4F)  ibuprofen  Tablet. 400 milliGRAM(s) Oral once PRN Temp greater or equal to 38C (100.4F)         Vitals log        ICU Vital Signs Last 24 Hrs  T(C): 37.2 (15 Sep 2022 05:16), Max: 38.5 (14 Sep 2022 10:06)  T(F): 98.9 (15 Sep 2022 05:16), Max: 101.3 (14 Sep 2022 10:06)  HR: 87 (15 Sep 2022 05:16) (81 - 91)  BP: 97/63 (15 Sep 2022 05:16) (93/59 - 106/61)  BP(mean): --  ABP: --  ABP(mean): --  RR: 18 (15 Sep 2022 05:16) (18 - 20)  SpO2: 96% (15 Sep 2022 05:16) (94% - 96%)    O2 Parameters below as of 15 Sep 2022 05:16  Patient On (Oxygen Delivery Method): nasal cannula                 Input and Output:  I&O's Detail    13 Sep 2022 07:01  -  14 Sep 2022 07:00  --------------------------------------------------------  IN:  Total IN: 0 mL    OUT:    Indwelling Catheter - Urethral (mL): 900 mL  Total OUT: 900 mL    Total NET: -900 mL      14 Sep 2022 07:01  -  15 Sep 2022 05:47  --------------------------------------------------------  IN:  Total IN: 0 mL    OUT:    Indwelling Catheter - Urethral (mL): 500 mL  Total OUT: 500 mL    Total NET: -500 mL          Lab Data                        8.8    11.78 )-----------( 523      ( 14 Sep 2022 06:55 )             29.0     09-14    148<H>  |  113<H>  |  24<H>  ----------------------------<  94  4.5   |  28  |  0.31<L>    Ca    8.7      14 Sep 2022 06:55  Mg     2.0     09-14              Review of Systems	      Objective     Physical Examination  heart s1s2  lung dec BS  abd soft        Pertinent Lab findings & Imaging      Yoel:  NO   Adequate UO     I&O's Detail    13 Sep 2022 07:01  -  14 Sep 2022 07:00  --------------------------------------------------------  IN:  Total IN: 0 mL    OUT:    Indwelling Catheter - Urethral (mL): 900 mL  Total OUT: 900 mL    Total NET: -900 mL      14 Sep 2022 07:01  -  15 Sep 2022 05:47  --------------------------------------------------------  IN:  Total IN: 0 mL    OUT:    Indwelling Catheter - Urethral (mL): 500 mL  Total OUT: 500 mL    Total NET: -500 mL               Discussed with:     Cultures:	        Radiology

## 2022-09-15 NOTE — PROGRESS NOTE ADULT - ASSESSMENT
MATEO LESLIE is a 72y Female transferred from [facility] facility of lethargy and hypoxia.   Patient is a terminal case of PD.   9/6/22 patient has fever, Blood and urine cultures in progress.  ID and pulmonary on the case

## 2022-09-15 NOTE — PROGRESS NOTE ADULT - ASSESSMENT
[facility] Infectious Diseases  Pt well know to our group from care at [facility]     72y Female transferred from [facility] of lethargy and hypoxia.   Patient is a terminal case of PD.   At ED arrival her O2 sat was 72%.  Patient DNR/DNI started on 100% NRBM and her saturation improved.   Patient is non communicative most of informations received from [facility] facility and from spouse.  Patient has PEG/JT and on feeding and Parkinson's Disease medications.  Blood culture (collected 9/4 1am) Proteus species ESBL  Blood culture collected 9/6-NGTD    RECOMMENDATIONS  PT with acute respiratory distress and on imaging suggestion of LLL PNA. And now noted to have ESBL Proteus bacteremia  recommended meropenem first day 9/5 9/7 changed to ertapenem but noted elevated wbc but clinically appears slightly improved  repeat blood cultures collected 9/6-NGTD  -ESBL proteus in urine so potential source as acute pyelonephritis with bacteremia    9/14-pt febrile - escalated to meropenem   9/15-afebrile since change to meropenem hope to still have 9/18 as last day    All interventions in keeping with goals of care     Thank you for consulting us and involving us in the management of this most interesting and challenging case.  We will follow along in the care of this patient. Please call us at [phone] or text me directly on my cell# at [phone] with any concerns.

## 2022-09-15 NOTE — DISCHARGE NOTE PROVIDER - NSDCMRMEDTOKEN_GEN_ALL_CORE_FT
acetaminophen 325 mg oral tablet: 2 tab(s) by PEG tube every 6 hours, As Needed  ascorbic acid 500 mg oral tablet: 1 tab(s) orally 2 times a day  baclofen: 2.5 milligram(s) by PEG tube 3 times a day  bisacodyl 10 mg rectal suppository: 1 suppository(ies) rectal once a day, As Needed  cyanocobalamin 1000 mcg/mL injectable solution: injectable once a month  Duopa 4.63 mg-20 mg/mL enteral suspension: 3 milliliters (20mg) by jejunostomy tube every hour around the clock.     As per spouse, each cartridge is changed q12 hours  Fleet Enema 19 g-7 g rectal enema: 1 application rectal once, As Needed  folic acid 1 mg oral tablet: 1 tab(s) by gastrostomy tube once a day  freetext medication     -: 1 billion vector genomes orally 4 times a day  midodrine 5 mg oral tablet: 1 tab(s) by PEG tube 3 times a day  Hold for SBP&gt;160 and and HR &lt;60  Milk of Magnesia: 30 milliliter(s) orally once a day, As Needed  mirtazapine 45 mg oral tablet: 1 tab(s) by PEG tube once a day (at bedtime)  Multiple Vitamins oral tablet: 1 tab(s) orally once a day  omeprazole 20 mg oral delayed release capsule: 1 cap(s) orally once a day  ondansetron 2 mg/mL injectable solution: 2 milliliter(s) injectable every 4 hours, As Needed  polyethylene glycol 3350 oral powder for reconstitution: 17 gram(s) orally once a day  sertraline 100 mg oral tablet: 1 tab(s) by gastrostomy tube once a day   acetaminophen 325 mg oral tablet: 2 tab(s) by PEG tube every 6 hours, As Needed  ascorbic acid 500 mg oral tablet: 1 tab(s) orally 2 times a day  baclofen: 2.5 milligram(s) by PEG tube 3 times a day  bisacodyl 10 mg rectal suppository: 1 suppository(ies) rectal once a day, As Needed  cyanocobalamin 1000 mcg/mL injectable solution: injectable once a month  Duopa 4.63 mg-20 mg/mL enteral suspension: 3 milliliters (20mg) by jejunostomy tube every hour around the clock.     As per spouse, each cartridge is changed q12 hours  enoxaparin 60 mg/0.6 mL injectable solution: 70 milligram(s) subcutaneous every 12 hours  Fleet Enema 19 g-7 g rectal enema: 1 application rectal once, As Needed  folic acid 1 mg oral tablet: 1 tab(s) by gastrostomy tube once a day  freetext medication     -: 1 billion vector genomes orally 4 times a day  meropenem 1000 mg intravenous injection: 1000 milligram(s) intravenous every 8 hours   midodrine 5 mg oral tablet: 1 tab(s) by PEG tube 3 times a day  Hold for SBP&gt;160 and and HR &lt;60  Milk of Magnesia: 30 milliliter(s) orally once a day, As Needed  mirtazapine 45 mg oral tablet: 1 tab(s) by PEG tube once a day (at bedtime)  Multiple Vitamins oral tablet: 1 tab(s) orally once a day  omeprazole 20 mg oral delayed release capsule: 1 cap(s) orally once a day  ondansetron 2 mg/mL injectable solution: 2 milliliter(s) injectable every 4 hours, As Needed  polyethylene glycol 3350 oral powder for reconstitution: 17 gram(s) orally once a day  sertraline 100 mg oral tablet: 1 tab(s) by gastrostomy tube once a day

## 2022-09-15 NOTE — PROGRESS NOTE ADULT - SUBJECTIVE AND OBJECTIVE BOX
Patient is a 72y old  Female who presents with a chief complaint of Lethargic and hypoxic (06 Sep 2022 05:52)  9/8/22: Patient still had fever > 101    INTERVAL OVER NIGHT EVENTS:    MEDICATIONS  (STANDING):  ascorbic acid 500 milliGRAM(s) Oral two times a day  baclofen 2.5 milliGRAM(s) Oral every 8 hours  cyanocobalamin 1000 MICROGram(s) Oral daily  Duopa Enteral Suspension 1 Bottle,carbidopa 4.63mg/levodopa 20mg per mL 72 mL/Day 72 mL/Day Enteral Tube Continuous Pump  enoxaparin Injectable 70 milliGRAM(s) SubCutaneous every 12 hours  ertapenem  IVPB 1000 milliGRAM(s) IV Intermittent every 24 hours  famotidine    Tablet 20 milliGRAM(s) Oral daily  folic acid 1 milliGRAM(s) Oral daily  influenza  Vaccine (HIGH DOSE) 0.7 milliLiter(s) IntraMuscular once  midodrine. 5 milliGRAM(s) Oral three times a day  mirtazapine 45 milliGRAM(s) Oral at bedtime  multivitamin 1 Tablet(s) Oral daily  polyethylene glycol 3350 17 Gram(s) Oral daily  rotigotine patch 4 mG/24 Hr(s) 1 Patch Transdermal every 24 hours  sertraline 100 milliGRAM(s) Oral daily    MEDICATIONS  (PRN):  acetaminophen     Tablet .. 650 milliGRAM(s) Oral every 6 hours PRN Temp greater or equal to 38C (100.4F), Moderate Pain (4 - 6)  acetaminophen  Suppository .. 650 milliGRAM(s) Rectal every 4 hours PRN Temp greater or equal to 38C (100.4F)  ibuprofen  Tablet. 400 milliGRAM(s) Oral once PRN Temp greater or equal to 38C (100.4F)    Allergies    Ceclor (Rash)  IV Contrast (Hypotension)  latex (Rash; Urticaria)    Intolerances    Vital Signs Last 24 Hrs  T(C): 37.4 (14 Sep 2022 12:23), Max: 38.5 (14 Sep 2022 10:06)  T(F): 99.4 (14 Sep 2022 12:23), Max: 101.3 (14 Sep 2022 10:06)  HR: 88 (14 Sep 2022 12:23) (85 - 88)  BP: 101/63 (14 Sep 2022 12:23) (100/61 - 101/63)  BP(mean): --  RR: 20 (14 Sep 2022 12:23) (18 - 20)  SpO2: 95% (14 Sep 2022 12:23) (95% - 96%)    Parameters below as of 14 Sep 2022 12:23  Patient On (Oxygen Delivery Method): room air        Parameters below as of 13 Sep 2022 20:26  Patient On (Oxygen Delivery Method): nasal cannula    PHYSICAL EXAM:  GENERAL: Non verbal  HEAD:  Atraumatic, Normocephalic  EYES: PERRLA, conjunctiva and sclera clear  ENMT:  Moist mucous membranes, Good dentition, No lesions  NECK: Supple, No JVD, Normal thyroid  NERVOUS SYSTEM:   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  CHEST/LUNG: Clear to percussion bilaterally; No rales, rhonchi, wheezing, or rubs  HEART: Regular rate and rhythm; No murmurs, rubs, or gallops  ABDOMEN: Soft, Nondistended; Bowel sounds present.  GT in place  EXTREMITIES:  2+ Peripheral Pulses, No clubbing, cyanosis, or edema  LYMPH: No lymphadenopathy noted  SKIN: No rashes or lesions    LABS:                        8.8    11.78 )-----------( 523      ( 14 Sep 2022 06:55 )             29.0     14 Sep 2022 06:55    148    |  113    |  24     ----------------------------<  94     4.5     |  28     |  0.31     Ca    8.7        14 Sep 2022 06:55  Mg     2.0       14 Sep 2022 06:55    CAPILLARY BLOOD GLUCOSE                        8.7    13.34 )-----------( 542      ( 12 Sep 2022 05:55 )             28.2     12 Sep 2022 05:55    149    |  115    |  26     ----------------------------<  110    4.6     |  29     |  0.39     Ca    8.5        12 Sep 2022 05:55  Mg     2.0       12 Sep 2022 05:55    TPro  6.3    /  Alb  2.1    /  TBili  0.2    /  DBili  x      /  AST  47     /  ALT  19     /  AlkPhos  95     12 Sep 2022 05:55    LIVER FUNCTIONS - ( 12 Sep 2022 05:55 )  Alb: 2.1 g/dL / Pro: 6.3 g/dL / ALK PHOS: 95 U/L / ALT: 19 U/L / AST: 47 U/L / GGT: x             RADIOLOGY & ADDITIONAL TESTS:    Imaging Personally Reviewed:  [x] YES  [ ] NO    Consultant(s) Notes Reviewed:  [x] YES  [ ] NO    Care Discussed with Consultants/Other Providers [x] YES  [ ] NO   Patient is a 72y old  Female who presents with a chief complaint of Lethargic and hypoxic (06 Sep 2022 05:52)  9/8/22: Patient still had fever > 101    INTERVAL OVER NIGHT EVENTS:    MEDICATIONS  (STANDING):  ascorbic acid 500 milliGRAM(s) Oral two times a day  baclofen 2.5 milliGRAM(s) Oral every 8 hours  cyanocobalamin 1000 MICROGram(s) Oral daily  Duopa Enteral Suspension 1 Bottle,carbidopa 4.63mg/levodopa 20mg per mL 72 mL/Day 72 mL/Day Enteral Tube Continuous Pump  enoxaparin Injectable 70 milliGRAM(s) SubCutaneous every 12 hours  ertapenem  IVPB 1000 milliGRAM(s) IV Intermittent every 24 hours  famotidine    Tablet 20 milliGRAM(s) Oral daily  folic acid 1 milliGRAM(s) Oral daily  influenza  Vaccine (HIGH DOSE) 0.7 milliLiter(s) IntraMuscular once  midodrine. 5 milliGRAM(s) Oral three times a day  mirtazapine 45 milliGRAM(s) Oral at bedtime  multivitamin 1 Tablet(s) Oral daily  polyethylene glycol 3350 17 Gram(s) Oral daily  rotigotine patch 4 mG/24 Hr(s) 1 Patch Transdermal every 24 hours  sertraline 100 milliGRAM(s) Oral daily    MEDICATIONS  (PRN):  acetaminophen     Tablet .. 650 milliGRAM(s) Oral every 6 hours PRN Temp greater or equal to 38C (100.4F), Moderate Pain (4 - 6)  acetaminophen  Suppository .. 650 milliGRAM(s) Rectal every 4 hours PRN Temp greater or equal to 38C (100.4F)  ibuprofen  Tablet. 400 milliGRAM(s) Oral once PRN Temp greater or equal to 38C (100.4F)    Allergies    Ceclor (Rash)  IV Contrast (Hypotension)  latex (Rash; Urticaria)    Intolerances    Vital Signs Last 24 Hrs  T(C): 37.3 (15 Sep 2022 12:25), Max: 37.3 (15 Sep 2022 12:25)  T(F): 99.1 (15 Sep 2022 12:25), Max: 99.1 (15 Sep 2022 12:25)  HR: 87 (15 Sep 2022 12:25) (81 - 87)  BP: 109/67 (15 Sep 2022 12:25) (93/59 - 109/67)  BP(mean): --  RR: 16 (15 Sep 2022 12:25) (16 - 18)  SpO2: 96% (15 Sep 2022 12:25) (96% - 96%)    Parameters below as of 15 Sep 2022 12:25  Patient On (Oxygen Delivery Method): nasal cannula  O2 Flow (L/min): 3    Parameters below as of 13 Sep 2022 20:26  Patient On (Oxygen Delivery Method): nasal cannula    PHYSICAL EXAM:  GENERAL: Non verbal  HEAD:  Atraumatic, Normocephalic  EYES: PERRLA, conjunctiva and sclera clear  ENMT:  Moist mucous membranes, Good dentition, No lesions  NECK: Supple, No JVD, Normal thyroid  NERVOUS SYSTEM:   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  CHEST/LUNG: Clear to percussion bilaterally; No rales, rhonchi, wheezing, or rubs  HEART: Regular rate and rhythm; No murmurs, rubs, or gallops  ABDOMEN: Soft, Nondistended; Bowel sounds present.  GT in place  EXTREMITIES:  2+ Peripheral Pulses, No clubbing, cyanosis, or edema  LYMPH: No lymphadenopathy noted  SKIN: No rashes or lesions    LABS:                        8.9    11.69 )-----------( 531      ( 15 Sep 2022 07:47 )             30.2     15 Sep 2022 07:47    147    |  113    |  36     ----------------------------<  128    3.9     |  29     |  0.38     Ca    8.9        15 Sep 2022 07:47  Mg     2.0       14 Sep 2022 06:55          CAPILLARY BLOOD GLUCOSE                                  8.8    11.78 )-----------( 523      ( 14 Sep 2022 06:55 )             29.0     14 Sep 2022 06:55    148    |  113    |  24     ----------------------------<  94     4.5     |  28     |  0.31     Ca    8.7        14 Sep 2022 06:55  Mg     2.0       14 Sep 2022 06:55    CAPILLARY BLOOD GLUCOSE                        8.7    13.34 )-----------( 542      ( 12 Sep 2022 05:55 )             28.2     12 Sep 2022 05:55    149    |  115    |  26     ----------------------------<  110    4.6     |  29     |  0.39     Ca    8.5        12 Sep 2022 05:55  Mg     2.0       12 Sep 2022 05:55    TPro  6.3    /  Alb  2.1    /  TBili  0.2    /  DBili  x      /  AST  47     /  ALT  19     /  AlkPhos  95     12 Sep 2022 05:55    LIVER FUNCTIONS - ( 12 Sep 2022 05:55 )  Alb: 2.1 g/dL / Pro: 6.3 g/dL / ALK PHOS: 95 U/L / ALT: 19 U/L / AST: 47 U/L / GGT: x             RADIOLOGY & ADDITIONAL TESTS:    Imaging Personally Reviewed:  [x] YES  [ ] NO    Consultant(s) Notes Reviewed:  [x] YES  [ ] NO    Care Discussed with Consultants/Other Providers [x] YES  [ ] NO

## 2022-09-15 NOTE — PROGRESS NOTE ADULT - SUBJECTIVE AND OBJECTIVE BOX
[facility] DIVISION of INFECTIOUS DISEASE  Cash Saini MD PhD, Mya Romero MD, Deann Zamarripa MD, Baldev Haynes MD, Chong Johns MD  and providing coverage with Lucinda Reynoso MD  Providing Infectious Disease Consultations at [facility], [facility], [facility]'s    Office# [phone] to schedule follow up appointments  Answering Service for urgent calls or New Consults [phone]  Cell# to text for urgent issues Cash Saini [phone]     infectious diseases progress note:    MATEO LESLIE is a 72y y. o. Female patient    Overnight and events of the last 24hrs reviewed    Allergies    Ceclor (Rash)  IV Contrast (Hypotension)  latex (Rash; Urticaria)    Intolerances        ANTIBIOTICS/RELEVANT:  antimicrobials  meropenem  IVPB 1000 milliGRAM(s) IV Intermittent every 8 hours    immunologic:  influenza  Vaccine (HIGH DOSE) 0.7 milliLiter(s) IntraMuscular once    OTHER:  acetaminophen     Tablet .. 650 milliGRAM(s) Oral every 6 hours PRN  acetaminophen  Suppository .. 650 milliGRAM(s) Rectal every 4 hours PRN  ascorbic acid 500 milliGRAM(s) Oral two times a day  baclofen 2.5 milliGRAM(s) Oral every 8 hours  cyanocobalamin 1000 MICROGram(s) Oral daily  Duopa Enteral Suspension 1 Bottle,carbidopa 4.63mg/levodopa 20mg per mL 72 mL/Day 72 mL/Day Enteral Tube Continuous Pump  enoxaparin Injectable 70 milliGRAM(s) SubCutaneous every 12 hours  famotidine    Tablet 20 milliGRAM(s) Oral daily  folic acid 1 milliGRAM(s) Oral daily  ibuprofen  Tablet. 400 milliGRAM(s) Oral once PRN  midodrine. 5 milliGRAM(s) Oral three times a day  mirtazapine 45 milliGRAM(s) Oral at bedtime  multivitamin 1 Tablet(s) Oral daily  polyethylene glycol 3350 17 Gram(s) Oral daily  rotigotine patch 4 mG/24 Hr(s) 1 Patch Transdermal every 24 hours  sertraline 100 milliGRAM(s) Oral daily      Objective:  Vital Signs Last 24 Hrs  T(C): 37.3 (15 Sep 2022 12:25), Max: 37.3 (15 Sep 2022 12:25)  T(F): 99.1 (15 Sep 2022 12:25), Max: 99.1 (15 Sep 2022 12:25)  HR: 87 (15 Sep 2022 12:25) (81 - 91)  BP: 109/67 (15 Sep 2022 12:25) (93/59 - 109/67)  BP(mean): --  RR: 16 (15 Sep 2022 12:25) (16 - 18)  SpO2: 96% (15 Sep 2022 12:25) (94% - 96%)    Parameters below as of 15 Sep 2022 12:25  Patient On (Oxygen Delivery Method): nasal cannula  O2 Flow (L/min): 3      T(C): 37.3 (09-15-22 @ 12:25), Max: 38.5 (09-14-22 @ 10:06)  T(C): 37.3 (09-15-22 @ 12:25), Max: 38.5 (09-14-22 @ 10:06)  T(C): 37.3 (09-15-22 @ 12:25), Max: 38.5 (09-14-22 @ 10:06)    PHYSICAL EXAM:  HEENT: NC atraumatic  Neck: supple  Respiratory: no accessory muscle use, breathing comfortably  Cardiovascular: distant  Gastrointestinal: normal appearing, nondistended  Extremities: no clubbing, no cyanosis,        LABS:                          8.9    11.69 )-----------( 531      ( 15 Sep 2022 07:47 )             30.2       WBC  11.69 09-15 @ 07:47  11.78 09-14 @ 06:55  13.34 09-12 @ 05:55  17.78 09-09 @ 07:29      09-15    147<H>  |  113<H>  |  36<H>  ----------------------------<  128<H>  3.9   |  29  |  0.38<L>    Ca    8.9      15 Sep 2022 07:47  Mg     2.0     09-14        Creatinine, Serum: 0.38 mg/dL (09-15-22 @ 07:47)  Creatinine, Serum: 0.31 mg/dL (09-14-22 @ 06:55)  Creatinine, Serum: 0.39 mg/dL (09-12-22 @ 05:55)  Creatinine, Serum: 0.33 mg/dL (09-09-22 @ 07:29)                INFLAMMATORY MARKERS  Auto Neutrophil #: 8.48 K/uL (09-15-22 @ 07:47)  Auto Lymphocyte #: 2.27 K/uL (09-15-22 @ 07:47)  Auto Neutrophil #: 8.28 K/uL (09-14-22 @ 06:55)  Auto Lymphocyte #: 2.31 K/uL (09-14-22 @ 06:55)  Auto Neutrophil #: 10.14 K/uL (09-12-22 @ 05:55)  Auto Lymphocyte #: 2.53 K/uL (09-12-22 @ 05:55)  Auto Neutrophil #: 13.07 K/uL (09-09-22 @ 07:29)  Auto Lymphocyte #: 2.63 K/uL (09-09-22 @ 07:29)  Auto Neutrophil #: 14.21 K/uL (09-08-22 @ 06:40)  Auto Lymphocyte #: 2.69 K/uL (09-08-22 @ 06:40)  Auto Neutrophil #: 12.51 K/uL (09-07-22 @ 06:47)  Auto Lymphocyte #: 2.29 K/uL (09-07-22 @ 06:47)  Auto Neutrophil #: 8.77 K/uL (09-05-22 @ 06:51)  Auto Lymphocyte #: 1.21 K/uL (09-05-22 @ 06:51)  Auto Neutrophil #: 8.59 K/uL (09-04-22 @ 06:24)  Auto Lymphocyte #: 1.16 K/uL (09-04-22 @ 06:24)  Auto Lymphocyte #: 1.49 K/uL (09-04-22 @ 01:03)  Auto Neutrophil #: 8.63 K/uL (09-04-22 @ 01:03)    Lactate, Blood: 1.9 mmol/L (09-04-22 @ 06:24)  Lactate, Blood: 2.7 mmol/L (09-04-22 @ 02:51)  Lactate, Blood: 2.8 mmol/L (09-04-22 @ 01:03)    Auto Eosinophil #: 0.28 K/uL (09-15-22 @ 07:47)  Auto Eosinophil #: 0.29 K/uL (09-14-22 @ 06:55)  Auto Eosinophil #: 0.00 K/uL (09-12-22 @ 05:55)  Auto Eosinophil #: 0.62 K/uL (09-09-22 @ 07:29)  Auto Eosinophil #: 0.70 K/uL (09-08-22 @ 06:40)  Auto Eosinophil #: 0.04 K/uL (09-07-22 @ 06:47)  Auto Eosinophil #: 0.13 K/uL (09-05-22 @ 06:51)  Auto Eosinophil #: 0.04 K/uL (09-04-22 @ 06:24)  Auto Eosinophil #: 0.00 K/uL (09-04-22 @ 01:03)                  Ferritin, Serum: 419 ng/mL (09-04-22 @ 02:51)        Activated Partial Thromboplastin Time: 26.9 sec (09-04-22 @ 01:03)  INR: 1.09 ratio (09-04-22 @ 01:03)    D-Dimer Assay, Quantitative: 1000 ng/mL DDU (09-06-22 @ 06:58)  D-Dimer Assay, Quantitative: 1731 ng/mL DDU (09-04-22 @ 08:42)        MICROBIOLOGY:              RADIOLOGY & ADDITIONAL STUDIES:

## 2022-09-15 NOTE — DISCHARGE NOTE PROVIDER - CARE PROVIDER_API CALL
Elijah Ventura  INTERNAL MEDICINE  [address] [place] [address], [facility] [place] [address]  Phone: [phone]  Fax: [phone]  Follow Up Time:

## 2022-09-15 NOTE — DISCHARGE NOTE PROVIDER - NSDCCPCAREPLAN_GEN_ALL_CORE_FT
PRINCIPAL DISCHARGE DIAGNOSIS  Diagnosis: Fever  Assessment and Plan of Treatment:       SECONDARY DISCHARGE DIAGNOSES  Diagnosis: Acute UTI  Assessment and Plan of Treatment:     Diagnosis: Hypoxia  Assessment and Plan of Treatment:

## 2022-09-15 NOTE — DISCHARGE NOTE PROVIDER - HOSPITAL COURSE
MATEO LESLIE is a 72[facility].   Patient is a terminal case of PD.   At ED arrival her O2 sat was 72%.  Patient DNR/DNI started on 100% NRBM and her saturation improved.   Patient is non communicative most of informations received from [facility] facility and from spouse.  Patient has PEG/JT and on feeding and Parkinson's Disease medications. MATEO LESLIE is a 72y Female transferred from [facility] of lethargy and hypoxia.   Patient is a terminal case of PD.   At ED arrival her O2 sat was 72%.  Patient DNR/DNI started on 100% NRBM and her saturation improved.   Patient is non communicative most of informations received from [facility] facility and from spouse.  Patient has PEG/JT and on feeding and Parkinson's Disease medications.  Patient treated with ID and Pulmonary consultations.  Patient's condition improved to base line with IV Meropenem.  Patient  discussed time to time about patient's codition and guarded prognosis as patient is a case or terminal parkinson's disease.

## 2022-09-16 ENCOUNTER — TRANSCRIPTION ENCOUNTER (OUTPATIENT)
Age: 72
End: 2022-09-16

## 2022-09-16 VITALS
DIASTOLIC BLOOD PRESSURE: 57 MMHG | RESPIRATION RATE: 17 BRPM | HEART RATE: 86 BPM | OXYGEN SATURATION: 95 % | SYSTOLIC BLOOD PRESSURE: 96 MMHG | TEMPERATURE: 99 F

## 2022-09-16 LAB
ANION GAP SERPL CALC-SCNC: 5 MMOL/L — SIGNIFICANT CHANGE UP (ref 5–17)
BASOPHILS # BLD AUTO: 0.06 K/UL — SIGNIFICANT CHANGE UP (ref 0–0.2)
BASOPHILS NFR BLD AUTO: 0.7 % — SIGNIFICANT CHANGE UP (ref 0–2)
BUN SERPL-MCNC: 28 MG/DL — HIGH (ref 7–23)
CALCIUM SERPL-MCNC: 8.7 MG/DL — SIGNIFICANT CHANGE UP (ref 8.5–10.1)
CHLORIDE SERPL-SCNC: 112 MMOL/L — HIGH (ref 96–108)
CO2 SERPL-SCNC: 31 MMOL/L — SIGNIFICANT CHANGE UP (ref 22–31)
CREAT SERPL-MCNC: 0.32 MG/DL — LOW (ref 0.5–1.3)
EGFR: 110 ML/MIN/1.73M2 — SIGNIFICANT CHANGE UP
EOSINOPHIL # BLD AUTO: 0.25 K/UL — SIGNIFICANT CHANGE UP (ref 0–0.5)
EOSINOPHIL NFR BLD AUTO: 2.8 % — SIGNIFICANT CHANGE UP (ref 0–6)
GLUCOSE SERPL-MCNC: 99 MG/DL — SIGNIFICANT CHANGE UP (ref 70–99)
HCT VFR BLD CALC: 28.8 % — LOW (ref 34.5–45)
HGB BLD-MCNC: 8.7 G/DL — LOW (ref 11.5–15.5)
IMM GRANULOCYTES NFR BLD AUTO: 0.9 % — SIGNIFICANT CHANGE UP (ref 0–0.9)
LYMPHOCYTES # BLD AUTO: 2.21 K/UL — SIGNIFICANT CHANGE UP (ref 1–3.3)
LYMPHOCYTES # BLD AUTO: 24.3 % — SIGNIFICANT CHANGE UP (ref 13–44)
MAGNESIUM SERPL-MCNC: 2 MG/DL — SIGNIFICANT CHANGE UP (ref 1.6–2.6)
MCHC RBC-ENTMCNC: 27 PG — SIGNIFICANT CHANGE UP (ref 27–34)
MCHC RBC-ENTMCNC: 30.2 GM/DL — LOW (ref 32–36)
MCV RBC AUTO: 89.4 FL — SIGNIFICANT CHANGE UP (ref 80–100)
MONOCYTES # BLD AUTO: 0.56 K/UL — SIGNIFICANT CHANGE UP (ref 0–0.9)
MONOCYTES NFR BLD AUTO: 6.2 % — SIGNIFICANT CHANGE UP (ref 2–14)
NEUTROPHILS # BLD AUTO: 5.92 K/UL — SIGNIFICANT CHANGE UP (ref 1.8–7.4)
NEUTROPHILS NFR BLD AUTO: 65.1 % — SIGNIFICANT CHANGE UP (ref 43–77)
NRBC # BLD: 0 /100 WBCS — SIGNIFICANT CHANGE UP (ref 0–0)
PLATELET # BLD AUTO: 457 K/UL — HIGH (ref 150–400)
POTASSIUM SERPL-MCNC: 4.1 MMOL/L — SIGNIFICANT CHANGE UP (ref 3.5–5.3)
POTASSIUM SERPL-SCNC: 4.1 MMOL/L — SIGNIFICANT CHANGE UP (ref 3.5–5.3)
RBC # BLD: 3.22 M/UL — LOW (ref 3.8–5.2)
RBC # FLD: 19 % — HIGH (ref 10.3–14.5)
SARS-COV-2 RNA SPEC QL NAA+PROBE: SIGNIFICANT CHANGE UP
SODIUM SERPL-SCNC: 148 MMOL/L — HIGH (ref 135–145)
WBC # BLD: 9.08 K/UL — SIGNIFICANT CHANGE UP (ref 3.8–10.5)
WBC # FLD AUTO: 9.08 K/UL — SIGNIFICANT CHANGE UP (ref 3.8–10.5)

## 2022-09-16 PROCEDURE — 96361 HYDRATE IV INFUSION ADD-ON: CPT

## 2022-09-16 PROCEDURE — 71045 X-RAY EXAM CHEST 1 VIEW: CPT

## 2022-09-16 PROCEDURE — 85610 PROTHROMBIN TIME: CPT

## 2022-09-16 PROCEDURE — 80061 LIPID PANEL: CPT

## 2022-09-16 PROCEDURE — 80053 COMPREHEN METABOLIC PANEL: CPT

## 2022-09-16 PROCEDURE — 99285 EMERGENCY DEPT VISIT HI MDM: CPT | Mod: 25

## 2022-09-16 PROCEDURE — 83550 IRON BINDING TEST: CPT

## 2022-09-16 PROCEDURE — 80048 BASIC METABOLIC PNL TOTAL CA: CPT

## 2022-09-16 PROCEDURE — 85027 COMPLETE CBC AUTOMATED: CPT

## 2022-09-16 PROCEDURE — 83880 ASSAY OF NATRIURETIC PEPTIDE: CPT

## 2022-09-16 PROCEDURE — 84484 ASSAY OF TROPONIN QUANT: CPT

## 2022-09-16 PROCEDURE — 93970 EXTREMITY STUDY: CPT

## 2022-09-16 PROCEDURE — 87186 SC STD MICRODIL/AGAR DIL: CPT

## 2022-09-16 PROCEDURE — 87040 BLOOD CULTURE FOR BACTERIA: CPT

## 2022-09-16 PROCEDURE — 84436 ASSAY OF TOTAL THYROXINE: CPT

## 2022-09-16 PROCEDURE — 81001 URINALYSIS AUTO W/SCOPE: CPT

## 2022-09-16 PROCEDURE — 82962 GLUCOSE BLOOD TEST: CPT

## 2022-09-16 PROCEDURE — 87077 CULTURE AEROBIC IDENTIFY: CPT

## 2022-09-16 PROCEDURE — 83540 ASSAY OF IRON: CPT

## 2022-09-16 PROCEDURE — 85379 FIBRIN DEGRADATION QUANT: CPT

## 2022-09-16 PROCEDURE — 84480 ASSAY TRIIODOTHYRONINE (T3): CPT

## 2022-09-16 PROCEDURE — 82803 BLOOD GASES ANY COMBINATION: CPT

## 2022-09-16 PROCEDURE — 82607 VITAMIN B-12: CPT

## 2022-09-16 PROCEDURE — 87637 SARSCOV2&INF A&B&RSV AMP PRB: CPT

## 2022-09-16 PROCEDURE — 85025 COMPLETE CBC W/AUTO DIFF WBC: CPT

## 2022-09-16 PROCEDURE — 87086 URINE CULTURE/COLONY COUNT: CPT

## 2022-09-16 PROCEDURE — 87635 SARS-COV-2 COVID-19 AMP PRB: CPT

## 2022-09-16 PROCEDURE — 83605 ASSAY OF LACTIC ACID: CPT

## 2022-09-16 PROCEDURE — 84100 ASSAY OF PHOSPHORUS: CPT

## 2022-09-16 PROCEDURE — 87150 DNA/RNA AMPLIFIED PROBE: CPT

## 2022-09-16 PROCEDURE — 97162 PT EVAL MOD COMPLEX 30 MIN: CPT

## 2022-09-16 PROCEDURE — 82728 ASSAY OF FERRITIN: CPT

## 2022-09-16 PROCEDURE — 96375 TX/PRO/DX INJ NEW DRUG ADDON: CPT

## 2022-09-16 PROCEDURE — 83735 ASSAY OF MAGNESIUM: CPT

## 2022-09-16 PROCEDURE — 36415 COLL VENOUS BLD VENIPUNCTURE: CPT

## 2022-09-16 PROCEDURE — 86803 HEPATITIS C AB TEST: CPT

## 2022-09-16 PROCEDURE — 93005 ELECTROCARDIOGRAM TRACING: CPT

## 2022-09-16 PROCEDURE — 85730 THROMBOPLASTIN TIME PARTIAL: CPT

## 2022-09-16 PROCEDURE — 83036 HEMOGLOBIN GLYCOSYLATED A1C: CPT

## 2022-09-16 PROCEDURE — 70450 CT HEAD/BRAIN W/O DYE: CPT | Mod: MA

## 2022-09-16 PROCEDURE — 84443 ASSAY THYROID STIM HORMONE: CPT

## 2022-09-16 PROCEDURE — 96374 THER/PROPH/DIAG INJ IV PUSH: CPT

## 2022-09-16 RX ORDER — ENOXAPARIN SODIUM 100 MG/ML
70 INJECTION SUBCUTANEOUS
Qty: 0 | Refills: 0 | DISCHARGE
Start: 2022-09-16

## 2022-09-16 RX ORDER — MEROPENEM 1 G/30ML
1000 INJECTION INTRAVENOUS
Qty: 9 | Refills: 0
Start: 2022-09-16 | End: 2022-09-18

## 2022-09-16 RX ADMIN — ROTIGOTINE 1 PATCH: 8 PATCH, EXTENDED RELEASE TRANSDERMAL at 10:41

## 2022-09-16 RX ADMIN — MIDODRINE HYDROCHLORIDE 5 MILLIGRAM(S): 2.5 TABLET ORAL at 06:37

## 2022-09-16 RX ADMIN — MEROPENEM 100 MILLIGRAM(S): 1 INJECTION INTRAVENOUS at 06:38

## 2022-09-16 RX ADMIN — Medication 2.5 MILLIGRAM(S): at 06:37

## 2022-09-16 RX ADMIN — PREGABALIN 1000 MICROGRAM(S): 225 CAPSULE ORAL at 11:22

## 2022-09-16 RX ADMIN — SERTRALINE 100 MILLIGRAM(S): 25 TABLET, FILM COATED ORAL at 11:22

## 2022-09-16 RX ADMIN — Medication 1 TABLET(S): at 11:22

## 2022-09-16 RX ADMIN — FAMOTIDINE 20 MILLIGRAM(S): 10 INJECTION INTRAVENOUS at 11:22

## 2022-09-16 RX ADMIN — Medication 1 MILLIGRAM(S): at 11:22

## 2022-09-16 RX ADMIN — ROTIGOTINE 1 PATCH: 8 PATCH, EXTENDED RELEASE TRANSDERMAL at 11:21

## 2022-09-16 RX ADMIN — MEROPENEM 100 MILLIGRAM(S): 1 INJECTION INTRAVENOUS at 13:03

## 2022-09-16 RX ADMIN — ENOXAPARIN SODIUM 70 MILLIGRAM(S): 100 INJECTION SUBCUTANEOUS at 06:37

## 2022-09-16 RX ADMIN — Medication 500 MILLIGRAM(S): at 06:37

## 2022-09-16 RX ADMIN — MIDODRINE HYDROCHLORIDE 5 MILLIGRAM(S): 2.5 TABLET ORAL at 11:22

## 2022-09-16 RX ADMIN — Medication 2.5 MILLIGRAM(S): at 13:03

## 2022-09-16 RX ADMIN — POLYETHYLENE GLYCOL 3350 17 GRAM(S): 17 POWDER, FOR SOLUTION ORAL at 11:23

## 2022-09-16 NOTE — PROGRESS NOTE ADULT - ASSESSMENT
MATEO LESLIE is a 72y Female transferred from [facility] facility of lethargy and hypoxia.   Patient is a terminal case of PD.   9/6/22 patient has fever, Blood and urine cultures in progress.  ID and pulmonary on the case  9/16/2022: Patient is afebrile more then 36 hours. on IV Merrem until 9/18/22. MATEO LESLIE is a 72y Female transferred from [facility] facility of lethargy and hypoxia.   Patient is a terminal case of PD.   9/6/22 patient has fever, Blood and urine cultures in progress.  ID and pulmonary on the case  9/16/2022: Patient is afebrile more then 36 hours. on IV Merrem until 9/18/22. D/W ID Dr Saini and DC to [facility]/SNF on IV Meropenem until 9/18/2022.

## 2022-09-16 NOTE — DISCHARGE NOTE NURSING/CASE MANAGEMENT/SOCIAL WORK - NSDCPEFALRISK_GEN_ALL_CORE
For information on Fall & Injury Prevention, visit: [email or web address] OR  [email or web address] OR  https://www.cdc.gov/steadi/patient.html

## 2022-09-16 NOTE — PROGRESS NOTE ADULT - PROBLEM SELECTOR PROBLEM 6
Dysphagia

## 2022-09-16 NOTE — PROGRESS NOTE ADULT - ASSESSMENT
[facility] Infectious Diseases  Pt well know to our group from care at [facility]     72y Female transferred from [facility] of lethargy and hypoxia.   Patient is a terminal case of PD.   At ED arrival her O2 sat was 72%.  Patient DNR/DNI started on 100% NRBM and her saturation improved.   Patient is non communicative most of informations received from [facility] facility and from spouse.  Patient has PEG/JT and on feeding and Parkinson's Disease medications.  Blood culture (collected 9/4 1am) Proteus species ESBL  Blood culture collected 9/6-NGTD    RECOMMENDATIONS  PT with acute respiratory distress and on imaging suggestion of LLL PNA. And now noted to have ESBL Proteus bacteremia  recommended meropenem first day 9/5 9/7 changed to ertapenem but noted elevated wbc but clinically appears slightly improved  repeat blood cultures collected 9/6-NGTD  -ESBL proteus in urine so potential source as acute pyelonephritis with bacteremia    9/14-pt febrile - escalated to meropenem   9/15-afebrile since change to meropenem    9/16 pt remains afebrile since change to meropenem so recommend 9/18 as last day and fine to complete outside of the acute hospital setting    All interventions in keeping with goals of care     From an ID standpoint no further requirement for inpatient status for the management of ID issues. Fine with discharge from ID standpoint when other medical issues no longer require inpatient care and social issues allow for a safe discharge plan. To schedule an outpatient ID follow up appointment please call our office at [phone]      Thank you for consulting us and involving us in the management of this most interesting and challenging case.  Please call us at [phone] or text me directly on my cell#[phone] with any concerns or further questions.

## 2022-09-16 NOTE — DISCHARGE NOTE NURSING/CASE MANAGEMENT/SOCIAL WORK - PATIENT PORTAL LINK FT
You can access the FollowMyHealth Patient Portal offered by [facility] by registering at the following website: [email or web address]. By joining [employer]’s FollowMyHealth portal, you will also be able to view your health information using other applications (apps) compatible with our system.

## 2022-09-16 NOTE — PROGRESS NOTE ADULT - REASON FOR ADMISSION
Lethargic and hypoxic

## 2022-09-16 NOTE — PROGRESS NOTE ADULT - PROBLEM SELECTOR PLAN 6
On GT feeding

## 2022-09-16 NOTE — PROGRESS NOTE ADULT - ASSESSMENT
72[facility].    PD  Hypoxemia  Effusion  elev D dimer  Frail  Weak  Elderly  OP  OA    cont fever trend - afebrile overnight -   ABX changed to Meropenem  ID follow up    ID eval in progress - Blood cx noted  labs and imaging reviewed  elev D dimer - contrast allergy cannot have CTA  HOB elev  asp prec  oral hygiene  pt has a PEG  pt has end stage Parkinson's disease - assist with needs - ADL - Fall prec - GOC - Pt is DNR  monitor VS and Sat  o2 support as needed - keep sat > 88 pct  prognosis guarded

## 2022-09-16 NOTE — PROGRESS NOTE ADULT - PROBLEM SELECTOR PLAN 2
On Intragastric drip

## 2022-09-16 NOTE — PROGRESS NOTE ADULT - SUBJECTIVE AND OBJECTIVE BOX
[facility] DIVISION of INFECTIOUS DISEASE  Cash Saini MD PhD, Mya Romero MD, Deann Zamarripa MD, Baldev Haynes MD, Chong Johns MD  and providing coverage with Lucinda Reynoso MD  Providing Infectious Disease Consultations at [facility], [facility], [facility]'s    Office# [phone] to schedule follow up appointments  Answering Service for urgent calls or New Consults [phone]  Cell# to text for urgent issues Cash Saini [phone]     infectious diseases progress note:    MATEO LESLIE is a 72y y. o. Female patient    Overnight and events of the last 24hrs reviewed    Allergies    Ceclor (Rash)  IV Contrast (Hypotension)  latex (Rash; Urticaria)    Intolerances        ANTIBIOTICS/RELEVANT:  antimicrobials  meropenem  IVPB 1000 milliGRAM(s) IV Intermittent every 8 hours    immunologic:  influenza  Vaccine (HIGH DOSE) 0.7 milliLiter(s) IntraMuscular once    OTHER:  acetaminophen     Tablet .. 650 milliGRAM(s) Oral every 6 hours PRN  acetaminophen  Suppository .. 650 milliGRAM(s) Rectal every 4 hours PRN  ascorbic acid 500 milliGRAM(s) Oral two times a day  baclofen 2.5 milliGRAM(s) Oral every 8 hours  cyanocobalamin 1000 MICROGram(s) Oral daily  Duopa Enteral Suspension 1 Bottle,carbidopa 4.63mg/levodopa 20mg per mL 72 mL/Day 72 mL/Day Enteral Tube Continuous Pump  enoxaparin Injectable 70 milliGRAM(s) SubCutaneous every 12 hours  famotidine    Tablet 20 milliGRAM(s) Oral daily  folic acid 1 milliGRAM(s) Oral daily  ibuprofen  Tablet. 400 milliGRAM(s) Oral once PRN  midodrine. 5 milliGRAM(s) Oral three times a day  mirtazapine 45 milliGRAM(s) Oral at bedtime  multivitamin 1 Tablet(s) Oral daily  polyethylene glycol 3350 17 Gram(s) Oral daily  rotigotine patch 4 mG/24 Hr(s) 1 Patch Transdermal every 24 hours  sertraline 100 milliGRAM(s) Oral daily      Objective:  Vital Signs Last 24 Hrs  T(C): 37 (16 Sep 2022 05:01), Max: 37.3 (15 Sep 2022 12:25)  T(F): 98.6 (16 Sep 2022 05:01), Max: 99.1 (15 Sep 2022 12:25)  HR: 85 (16 Sep 2022 05:15) (81 - 87)  BP: 98/58 (16 Sep 2022 05:15) (88/56 - 109/67)  BP(mean): --  RR: 17 (16 Sep 2022 05:01) (16 - 17)  SpO2: 97% (16 Sep 2022 05:01) (96% - 97%)    Parameters below as of 16 Sep 2022 05:01  Patient On (Oxygen Delivery Method): nasal cannula        T(C): 37 (09-16-22 @ 05:01), Max: 37.4 (09-14-22 @ 12:23)  T(C): 37 (09-16-22 @ 05:01), Max: 38.5 (09-14-22 @ 10:06)  T(C): 37 (09-16-22 @ 05:01), Max: 38.5 (09-14-22 @ 10:06)    PHYSICAL EXAM:  HEENT: NC atraumatic  Neck: supple  Respiratory: no accessory muscle use, breathing comfortably  Cardiovascular: distant  Gastrointestinal: normal appearing, nondistended  Extremities: no clubbing, no cyanosis,        LABS:                          8.7    9.08  )-----------( 457      ( 16 Sep 2022 06:25 )             28.8       WBC  9.08 09-16 @ 06:25  11.69 09-15 @ 07:47  11.78 09-14 @ 06:55  13.34 09-12 @ 05:55      09-16    148<H>  |  112<H>  |  28<H>  ----------------------------<  99  4.1   |  31  |  0.32<L>    Ca    8.7      16 Sep 2022 06:25  Mg     2.0     09-16        Creatinine, Serum: 0.32 mg/dL (09-16-22 @ 06:25)  Creatinine, Serum: 0.38 mg/dL (09-15-22 @ 07:47)  Creatinine, Serum: 0.31 mg/dL (09-14-22 @ 06:55)  Creatinine, Serum: 0.39 mg/dL (09-12-22 @ 05:55)                INFLAMMATORY MARKERS  Auto Neutrophil #: 5.92 K/uL (09-16-22 @ 06:25)  Auto Lymphocyte #: 2.21 K/uL (09-16-22 @ 06:25)  Auto Neutrophil #: 8.48 K/uL (09-15-22 @ 07:47)  Auto Lymphocyte #: 2.27 K/uL (09-15-22 @ 07:47)  Auto Neutrophil #: 8.28 K/uL (09-14-22 @ 06:55)  Auto Lymphocyte #: 2.31 K/uL (09-14-22 @ 06:55)  Auto Neutrophil #: 10.14 K/uL (09-12-22 @ 05:55)  Auto Lymphocyte #: 2.53 K/uL (09-12-22 @ 05:55)  Auto Neutrophil #: 13.07 K/uL (09-09-22 @ 07:29)  Auto Lymphocyte #: 2.63 K/uL (09-09-22 @ 07:29)  Auto Neutrophil #: 14.21 K/uL (09-08-22 @ 06:40)  Auto Lymphocyte #: 2.69 K/uL (09-08-22 @ 06:40)  Auto Neutrophil #: 12.51 K/uL (09-07-22 @ 06:47)  Auto Lymphocyte #: 2.29 K/uL (09-07-22 @ 06:47)  Auto Neutrophil #: 8.77 K/uL (09-05-22 @ 06:51)  Auto Lymphocyte #: 1.21 K/uL (09-05-22 @ 06:51)  Auto Neutrophil #: 8.59 K/uL (09-04-22 @ 06:24)  Auto Lymphocyte #: 1.16 K/uL (09-04-22 @ 06:24)  Auto Lymphocyte #: 1.49 K/uL (09-04-22 @ 01:03)  Auto Neutrophil #: 8.63 K/uL (09-04-22 @ 01:03)    Lactate, Blood: 1.9 mmol/L (09-04-22 @ 06:24)  Lactate, Blood: 2.7 mmol/L (09-04-22 @ 02:51)  Lactate, Blood: 2.8 mmol/L (09-04-22 @ 01:03)    Auto Eosinophil #: 0.25 K/uL (09-16-22 @ 06:25)  Auto Eosinophil #: 0.28 K/uL (09-15-22 @ 07:47)  Auto Eosinophil #: 0.29 K/uL (09-14-22 @ 06:55)  Auto Eosinophil #: 0.00 K/uL (09-12-22 @ 05:55)  Auto Eosinophil #: 0.62 K/uL (09-09-22 @ 07:29)  Auto Eosinophil #: 0.70 K/uL (09-08-22 @ 06:40)  Auto Eosinophil #: 0.04 K/uL (09-07-22 @ 06:47)  Auto Eosinophil #: 0.13 K/uL (09-05-22 @ 06:51)  Auto Eosinophil #: 0.04 K/uL (09-04-22 @ 06:24)  Auto Eosinophil #: 0.00 K/uL (09-04-22 @ 01:03)                  Ferritin, Serum: 419 ng/mL (09-04-22 @ 02:51)        Activated Partial Thromboplastin Time: 26.9 sec (09-04-22 @ 01:03)  INR: 1.09 ratio (09-04-22 @ 01:03)    D-Dimer Assay, Quantitative: 1000 ng/mL DDU (09-06-22 @ 06:58)  D-Dimer Assay, Quantitative: 1731 ng/mL DDU (09-04-22 @ 08:42)        MICROBIOLOGY:              RADIOLOGY & ADDITIONAL STUDIES:

## 2022-09-16 NOTE — PROGRESS NOTE ADULT - SUBJECTIVE AND OBJECTIVE BOX
Patient is a 72y old  Female who presents with a chief complaint of Lethargic and hypoxic (06 Sep 2022 05:52)  9/8/22: Patient still had fever > 101  9/16/2022: Patient is afebrile more then 36 hours. on IV Merrem until 9/18/22.    INTERVAL OVER NIGHT EVENTS:    MEDICATIONS  (STANDING):  ascorbic acid 500 milliGRAM(s) Oral two times a day  baclofen 2.5 milliGRAM(s) Oral every 8 hours  cyanocobalamin 1000 MICROGram(s) Oral daily  Duopa Enteral Suspension 1 Bottle,carbidopa 4.63mg/levodopa 20mg per mL 72 mL/Day 72 mL/Day Enteral Tube Continuous Pump  enoxaparin Injectable 70 milliGRAM(s) SubCutaneous every 12 hours  ertapenem  IVPB 1000 milliGRAM(s) IV Intermittent every 24 hours  famotidine    Tablet 20 milliGRAM(s) Oral daily  folic acid 1 milliGRAM(s) Oral daily  influenza  Vaccine (HIGH DOSE) 0.7 milliLiter(s) IntraMuscular once  midodrine. 5 milliGRAM(s) Oral three times a day  mirtazapine 45 milliGRAM(s) Oral at bedtime  multivitamin 1 Tablet(s) Oral daily  polyethylene glycol 3350 17 Gram(s) Oral daily  rotigotine patch 4 mG/24 Hr(s) 1 Patch Transdermal every 24 hours  sertraline 100 milliGRAM(s) Oral daily    MEDICATIONS  (PRN):  acetaminophen     Tablet .. 650 milliGRAM(s) Oral every 6 hours PRN Temp greater or equal to 38C (100.4F), Moderate Pain (4 - 6)  acetaminophen  Suppository .. 650 milliGRAM(s) Rectal every 4 hours PRN Temp greater or equal to 38C (100.4F)  ibuprofen  Tablet. 400 milliGRAM(s) Oral once PRN Temp greater or equal to 38C (100.4F)    Allergies    Ceclor (Rash)  IV Contrast (Hypotension)  latex (Rash; Urticaria)    Intolerances    Vital Signs Last 24 Hrs  T(C): 37.3 (15 Sep 2022 12:25), Max: 37.3 (15 Sep 2022 12:25)  T(F): 99.1 (15 Sep 2022 12:25), Max: 99.1 (15 Sep 2022 12:25)  HR: 87 (15 Sep 2022 12:25) (81 - 87)  BP: 109/67 (15 Sep 2022 12:25) (93/59 - 109/67)  BP(mean): --  RR: 16 (15 Sep 2022 12:25) (16 - 18)  SpO2: 96% (15 Sep 2022 12:25) (96% - 96%)    Parameters below as of 15 Sep 2022 12:25  Patient On (Oxygen Delivery Method): nasal cannula  O2 Flow (L/min): 3    Parameters below as of 13 Sep 2022 20:26  Patient On (Oxygen Delivery Method): nasal cannula    PHYSICAL EXAM:  GENERAL: Non verbal  HEAD:  Atraumatic, Normocephalic  EYES: PERRLA, conjunctiva and sclera clear  ENMT:  Moist mucous membranes, Good dentition, No lesions  NECK: Supple, No JVD, Normal thyroid  NERVOUS SYSTEM:   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  CHEST/LUNG: Clear to percussion bilaterally; No rales, rhonchi, wheezing, or rubs  HEART: Regular rate and rhythm; No murmurs, rubs, or gallops  ABDOMEN: Soft, Nondistended; Bowel sounds present.  GT in place  EXTREMITIES:  2+ Peripheral Pulses, No clubbing, cyanosis, or edema  LYMPH: No lymphadenopathy noted  SKIN: No rashes or lesions    LABS:                        8.9    11.69 )-----------( 531      ( 15 Sep 2022 07:47 )             30.2     15 Sep 2022 07:47    147    |  113    |  36     ----------------------------<  128    3.9     |  29     |  0.38     Ca    8.9        15 Sep 2022 07:47  Mg     2.0       14 Sep 2022 06:55          CAPILLARY BLOOD GLUCOSE                                  8.8    11.78 )-----------( 523      ( 14 Sep 2022 06:55 )             29.0     14 Sep 2022 06:55    148    |  113    |  24     ----------------------------<  94     4.5     |  28     |  0.31     Ca    8.7        14 Sep 2022 06:55  Mg     2.0       14 Sep 2022 06:55    CAPILLARY BLOOD GLUCOSE                        8.7    13.34 )-----------( 542      ( 12 Sep 2022 05:55 )             28.2     12 Sep 2022 05:55    149    |  115    |  26     ----------------------------<  110    4.6     |  29     |  0.39     Ca    8.5        12 Sep 2022 05:55  Mg     2.0       12 Sep 2022 05:55    TPro  6.3    /  Alb  2.1    /  TBili  0.2    /  DBili  x      /  AST  47     /  ALT  19     /  AlkPhos  95     12 Sep 2022 05:55    LIVER FUNCTIONS - ( 12 Sep 2022 05:55 )  Alb: 2.1 g/dL / Pro: 6.3 g/dL / ALK PHOS: 95 U/L / ALT: 19 U/L / AST: 47 U/L / GGT: x             RADIOLOGY & ADDITIONAL TESTS:    Imaging Personally Reviewed:  [x] YES  [ ] NO    Consultant(s) Notes Reviewed:  [x] YES  [ ] NO    Care Discussed with Consultants/Other Providers [x] YES  [ ] NO   Patient is a 72y old  Female who presents with a chief complaint of Lethargic and hypoxic (06 Sep 2022 05:52)  9/8/22: Patient still had fever > 101  9/16/2022: Patient is afebrile more then 36 hours. on IV Merrem until 9/18/22.    INTERVAL OVER NIGHT EVENTS:    MEDICATIONS  (STANDING):  ascorbic acid 500 milliGRAM(s) Oral two times a day  baclofen 2.5 milliGRAM(s) Oral every 8 hours  cyanocobalamin 1000 MICROGram(s) Oral daily  Duopa Enteral Suspension 1 Bottle,carbidopa 4.63mg/levodopa 20mg per mL 72 mL/Day 72 mL/Day Enteral Tube Continuous Pump  enoxaparin Injectable 70 milliGRAM(s) SubCutaneous every 12 hours  ertapenem  IVPB 1000 milliGRAM(s) IV Intermittent every 24 hours  famotidine    Tablet 20 milliGRAM(s) Oral daily  folic acid 1 milliGRAM(s) Oral daily  influenza  Vaccine (HIGH DOSE) 0.7 milliLiter(s) IntraMuscular once  midodrine. 5 milliGRAM(s) Oral three times a day  mirtazapine 45 milliGRAM(s) Oral at bedtime  multivitamin 1 Tablet(s) Oral daily  polyethylene glycol 3350 17 Gram(s) Oral daily  rotigotine patch 4 mG/24 Hr(s) 1 Patch Transdermal every 24 hours  sertraline 100 milliGRAM(s) Oral daily    MEDICATIONS  (PRN):  acetaminophen     Tablet .. 650 milliGRAM(s) Oral every 6 hours PRN Temp greater or equal to 38C (100.4F), Moderate Pain (4 - 6)  acetaminophen  Suppository .. 650 milliGRAM(s) Rectal every 4 hours PRN Temp greater or equal to 38C (100.4F)  ibuprofen  Tablet. 400 milliGRAM(s) Oral once PRN Temp greater or equal to 38C (100.4F)    Allergies    Ceclor (Rash)  IV Contrast (Hypotension)  latex (Rash; Urticaria)    Intolerances    Vital Signs Last 24 Hrs  T(C): 37.2 (16 Sep 2022 13:04), Max: 37.2 (16 Sep 2022 13:04)  T(F): 98.9 (16 Sep 2022 13:04), Max: 98.9 (16 Sep 2022 13:04)  HR: 86 (16 Sep 2022 13:04) (86 - 86)  BP: 96/57 (16 Sep 2022 13:04) (96/57 - 96/57)  BP(mean): --  RR: 17 (16 Sep 2022 13:04) (17 - 17)  SpO2: 95% (16 Sep 2022 13:04) (95% - 95%)    Parameters below as of 16 Sep 2022 13:04  Patient On (Oxygen Delivery Method): nasal cannula  O2 Flow (L/min): 3      Parameters below as of 15 Sep 2022 12:25  Patient On (Oxygen Delivery Method): nasal cannula  O2 Flow (L/min): 3    Parameters below as of 13 Sep 2022 20:26  Patient On (Oxygen Delivery Method): nasal cannula    PHYSICAL EXAM:  GENERAL: Non verbal  HEAD:  Atraumatic, Normocephalic  EYES: PERRLA, conjunctiva and sclera clear  ENMT:  Moist mucous membranes, Good dentition, No lesions  NECK: Supple, No JVD, Normal thyroid  NERVOUS SYSTEM:   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  CHEST/LUNG: Clear to percussion bilaterally; No rales, rhonchi, wheezing, or rubs  HEART: Regular rate and rhythm; No murmurs, rubs, or gallops  ABDOMEN: Soft, Nondistended; Bowel sounds present.  GT in place  EXTREMITIES:  2+ Peripheral Pulses, No clubbing, cyanosis, or edema  LYMPH: No lymphadenopathy noted  SKIN: No rashes or lesions    LABS:                        8.7    9.08  )-----------( 457      ( 16 Sep 2022 06:25 )             28.8     16 Sep 2022 06:25    148    |  112    |  28     ----------------------------<  99     4.1     |  31     |  0.32     Ca    8.7        16 Sep 2022 06:25  Mg     2.0       16 Sep 2022 06:25    CAPILLARY BLOOD GLUCOSE                        8.9    11.69 )-----------( 531      ( 15 Sep 2022 07:47 )             30.2     15 Sep 2022 07:47    147    |  113    |  36     ----------------------------<  128    3.9     |  29     |  0.38     Ca    8.9        15 Sep 2022 07:47  Mg     2.0       14 Sep 2022 06:55      RADIOLOGY & ADDITIONAL TESTS:    Imaging Personally Reviewed:  [x] YES  [ ] NO    Consultant(s) Notes Reviewed:  [x] YES  [ ] NO    Care Discussed with Consultants/Other Providers [x] YES  [ ] NO

## 2022-09-16 NOTE — DISCHARGE NOTE NURSING/CASE MANAGEMENT/SOCIAL WORK - NSPROEXTENSIONSOFSELF_GEN_A_NUR
Detail Level: Zone Detail Level: Detailed Detail Level: Generalized Moisturizer Recommendations: Cesia Mena moisturizer cream.\\n. Cleanser Recommendations: Dove hydrating cleanser eyeglasses/walker/wheelchair

## 2022-09-16 NOTE — PROGRESS NOTE ADULT - PROBLEM SELECTOR PLAN 1
DNR/DNI on 100% NRBM,

## 2022-09-16 NOTE — PROGRESS NOTE ADULT - PROVIDER SPECIALTY LIST ADULT
Hospitalist
Infectious Disease
Pulmonology
Hospitalist
Infectious Disease
Pulmonology
Hospitalist
Infectious Disease
Pulmonology
Pulmonology
Hospitalist
Infectious Disease
Infectious Disease
Pulmonology
Hospitalist

## 2022-09-16 NOTE — PROGRESS NOTE ADULT - PROBLEM SELECTOR PROBLEM 2
Parkinsons disease

## 2022-09-16 NOTE — PROGRESS NOTE ADULT - PROBLEM SELECTOR PROBLEM 3
Constipation, acute

## 2022-09-16 NOTE — PROGRESS NOTE ADULT - SUBJECTIVE AND OBJECTIVE BOX
Date/Time Patient Seen:  		  Referring MD:   Data Reviewed	       Patient is a 72y old  Female who presents with a chief complaint of Lethargic and hypoxic (15 Sep 2022 21:04)      Subjective/HPI     PAST MEDICAL & SURGICAL HISTORY:  Constipation    Unilateral primary osteoarthritis, left hip    Parkinson&#x27;s disease    Basal cell carcinoma  scalp    GERD (gastroesophageal reflux disease)    Seasonal allergies    Depression    Acute respiratory failure with hypoxia    S/P deep brain stimulator placement  2006, 2 brain pacemakers Model #37550, batteries replaced 2014    History of   10/1982    H/O ovarian cystectomy  right,     H/O colonoscopy  , 2008 polypectomy, ,     Basal cell carcinoma  removal, mid vertex scalp, 2002    Abdominal hernia  with mesh, 2003          Medication list         MEDICATIONS  (STANDING):  ascorbic acid 500 milliGRAM(s) Oral two times a day  baclofen 2.5 milliGRAM(s) Oral every 8 hours  cyanocobalamin 1000 MICROGram(s) Oral daily  Duopa Enteral Suspension 1 Bottle,carbidopa 4.63mg/levodopa 20mg per mL 72 mL/Day 72 mL/Day Enteral Tube Continuous Pump  enoxaparin Injectable 70 milliGRAM(s) SubCutaneous every 12 hours  famotidine    Tablet 20 milliGRAM(s) Oral daily  folic acid 1 milliGRAM(s) Oral daily  influenza  Vaccine (HIGH DOSE) 0.7 milliLiter(s) IntraMuscular once  meropenem  IVPB 1000 milliGRAM(s) IV Intermittent every 8 hours  midodrine. 5 milliGRAM(s) Oral three times a day  mirtazapine 45 milliGRAM(s) Oral at bedtime  multivitamin 1 Tablet(s) Oral daily  polyethylene glycol 3350 17 Gram(s) Oral daily  rotigotine patch 4 mG/24 Hr(s) 1 Patch Transdermal every 24 hours  sertraline 100 milliGRAM(s) Oral daily    MEDICATIONS  (PRN):  acetaminophen     Tablet .. 650 milliGRAM(s) Oral every 6 hours PRN Temp greater or equal to 38C (100.4F), Moderate Pain (4 - 6)  acetaminophen  Suppository .. 650 milliGRAM(s) Rectal every 4 hours PRN Temp greater or equal to 38C (100.4F)  ibuprofen  Tablet. 400 milliGRAM(s) Oral once PRN Temp greater or equal to 38C (100.4F)         Vitals log        ICU Vital Signs Last 24 Hrs  T(C): 37 (16 Sep 2022 05:01), Max: 37.3 (15 Sep 2022 12:25)  T(F): 98.6 (16 Sep 2022 05:01), Max: 99.1 (15 Sep 2022 12:25)  HR: 81 (16 Sep 2022 05:01) (81 - 87)  BP: 88/56 (16 Sep 2022 05:) (88/56 - 109/67)  BP(mean): --  ABP: --  ABP(mean): --  RR: 17 (16 Sep 2022 05:) (16 - 17)  SpO2: 97% (16 Sep 2022 05:) (96% - 97%)    O2 Parameters below as of 16 Sep 2022 05:  Patient On (Oxygen Delivery Method): nasal cannula                 Input and Output:  I&O's Detail    14 Sep 2022 07:01  -  15 Sep 2022 07:00  --------------------------------------------------------  IN:  Total IN: 0 mL    OUT:    Indwelling Catheter - Urethral (mL): 1300 mL  Total OUT: 1300 mL    Total NET: -1300 mL      15 Sep 2022 07:01  -  16 Sep 2022 05:49  --------------------------------------------------------  IN:  Total IN: 0 mL    OUT:    Indwelling Catheter - Urethral (mL): 800 mL  Total OUT: 800 mL    Total NET: -800 mL          Lab Data                        8.9    11.69 )-----------( 531      ( 15 Sep 2022 07:47 )             30.2     09-15    147<H>  |  113<H>  |  36<H>  ----------------------------<  128<H>  3.9   |  29  |  0.38<L>    Ca    8.9      15 Sep 2022 07:47  Mg     2.0     09-14              Review of Systems	      Objective     Physical Examination    heart s1s2  lung dc BS  abd soft      Pertinent Lab findings & Imaging      Yoel:  NO   Adequate UO     I&O's Detail    14 Sep 2022 07:01  -  15 Sep 2022 07:00  --------------------------------------------------------  IN:  Total IN: 0 mL    OUT:    Indwelling Catheter - Urethral (mL): 1300 mL  Total OUT: 1300 mL    Total NET: -1300 mL      15 Sep 2022 07:01  -  16 Sep 2022 05:49  --------------------------------------------------------  IN:  Total IN: 0 mL    OUT:    Indwelling Catheter - Urethral (mL): 800 mL  Total OUT: 800 mL    Total NET: -800 mL               Discussed with:     Cultures:	        Radiology                             DISPLAY PLAN FREE TEXT

## 2022-09-16 NOTE — PROGRESS NOTE ADULT - PROBLEM SELECTOR PROBLEM 4
Lethargic

## 2022-09-16 NOTE — PROGRESS NOTE ADULT - PROBLEM SELECTOR PROBLEM 5
Severe dementia

## 2022-11-04 ENCOUNTER — INPATIENT (INPATIENT)
Facility: HOSPITAL | Age: 72
LOS: 5 days | Discharge: NURSING, REHAB OR LONG-TERM CARE | DRG: 919 | End: 2022-11-10
Attending: FAMILY MEDICINE | Admitting: INTERNAL MEDICINE
Payer: MEDICARE

## 2022-11-04 VITALS
OXYGEN SATURATION: 95 % | HEIGHT: 65 IN | DIASTOLIC BLOOD PRESSURE: 78 MMHG | HEART RATE: 84 BPM | RESPIRATION RATE: 18 BRPM | WEIGHT: 143.96 LBS | SYSTOLIC BLOOD PRESSURE: 121 MMHG | TEMPERATURE: 97 F

## 2022-11-04 DIAGNOSIS — N39.0 URINARY TRACT INFECTION, SITE NOT SPECIFIED: ICD-10-CM

## 2022-11-04 DIAGNOSIS — T85.528A DISPLACEMENT OF OTHER GASTROINTESTINAL PROSTHETIC DEVICES, IMPLANTS AND GRAFTS, INITIAL ENCOUNTER: ICD-10-CM

## 2022-11-04 DIAGNOSIS — Z29.9 ENCOUNTER FOR PROPHYLACTIC MEASURES, UNSPECIFIED: ICD-10-CM

## 2022-11-04 DIAGNOSIS — J11.1 INFLUENZA DUE TO UNIDENTIFIED INFLUENZA VIRUS WITH OTHER RESPIRATORY MANIFESTATIONS: ICD-10-CM

## 2022-11-04 DIAGNOSIS — K46.9 UNSPECIFIED ABDOMINAL HERNIA WITHOUT OBSTRUCTION OR GANGRENE: Chronic | ICD-10-CM

## 2022-11-04 DIAGNOSIS — F32.9 MAJOR DEPRESSIVE DISORDER, SINGLE EPISODE, UNSPECIFIED: ICD-10-CM

## 2022-11-04 DIAGNOSIS — Z98.890 OTHER SPECIFIED POSTPROCEDURAL STATES: Chronic | ICD-10-CM

## 2022-11-04 DIAGNOSIS — Z78.9 OTHER SPECIFIED HEALTH STATUS: ICD-10-CM

## 2022-11-04 DIAGNOSIS — Z96.89 PRESENCE OF OTHER SPECIFIED FUNCTIONAL IMPLANTS: Chronic | ICD-10-CM

## 2022-11-04 DIAGNOSIS — K59.00 CONSTIPATION, UNSPECIFIED: ICD-10-CM

## 2022-11-04 DIAGNOSIS — Z98.891 HISTORY OF UTERINE SCAR FROM PREVIOUS SURGERY: Chronic | ICD-10-CM

## 2022-11-04 DIAGNOSIS — G20 PARKINSON'S DISEASE: ICD-10-CM

## 2022-11-04 DIAGNOSIS — C44.91 BASAL CELL CARCINOMA OF SKIN, UNSPECIFIED: Chronic | ICD-10-CM

## 2022-11-04 PROBLEM — J96.01 ACUTE RESPIRATORY FAILURE WITH HYPOXIA: Chronic | Status: ACTIVE | Noted: 2022-09-04

## 2022-11-04 LAB
ALBUMIN SERPL ELPH-MCNC: 3.1 G/DL — LOW (ref 3.3–5)
ALP SERPL-CCNC: 90 U/L — SIGNIFICANT CHANGE UP (ref 40–120)
ALT FLD-CCNC: 11 U/L — LOW (ref 12–78)
ANION GAP SERPL CALC-SCNC: 6 MMOL/L — SIGNIFICANT CHANGE UP (ref 5–17)
APPEARANCE UR: ABNORMAL
APTT BLD: 33.7 SEC — SIGNIFICANT CHANGE UP (ref 27.5–35.5)
AST SERPL-CCNC: 14 U/L — LOW (ref 15–37)
BACTERIA # UR AUTO: ABNORMAL
BASOPHILS # BLD AUTO: 0.01 K/UL — SIGNIFICANT CHANGE UP (ref 0–0.2)
BASOPHILS NFR BLD AUTO: 0.2 % — SIGNIFICANT CHANGE UP (ref 0–2)
BILIRUB SERPL-MCNC: 0.3 MG/DL — SIGNIFICANT CHANGE UP (ref 0.2–1.2)
BILIRUB UR-MCNC: NEGATIVE — SIGNIFICANT CHANGE UP
BUN SERPL-MCNC: 20 MG/DL — SIGNIFICANT CHANGE UP (ref 7–23)
CALCIUM SERPL-MCNC: 9.5 MG/DL — SIGNIFICANT CHANGE UP (ref 8.5–10.1)
CHLORIDE SERPL-SCNC: 107 MMOL/L — SIGNIFICANT CHANGE UP (ref 96–108)
CK SERPL-CCNC: 15 U/L — LOW (ref 26–192)
CO2 SERPL-SCNC: 29 MMOL/L — SIGNIFICANT CHANGE UP (ref 22–31)
COLOR SPEC: YELLOW — SIGNIFICANT CHANGE UP
COMMENT - URINE: SIGNIFICANT CHANGE UP
CREAT SERPL-MCNC: 0.36 MG/DL — LOW (ref 0.5–1.3)
DIFF PNL FLD: ABNORMAL
EGFR: 106 ML/MIN/1.73M2 — SIGNIFICANT CHANGE UP
EOSINOPHIL # BLD AUTO: 0.1 K/UL — SIGNIFICANT CHANGE UP (ref 0–0.5)
EOSINOPHIL NFR BLD AUTO: 2.5 % — SIGNIFICANT CHANGE UP (ref 0–6)
EPI CELLS # UR: SIGNIFICANT CHANGE UP
FLUAV H3 RNA SPEC QL NAA+PROBE: DETECTED
GLUCOSE SERPL-MCNC: 96 MG/DL — SIGNIFICANT CHANGE UP (ref 70–99)
GLUCOSE UR QL: NEGATIVE — SIGNIFICANT CHANGE UP
HCT VFR BLD CALC: 35.8 % — SIGNIFICANT CHANGE UP (ref 34.5–45)
HGB BLD-MCNC: 11.1 G/DL — LOW (ref 11.5–15.5)
IMM GRANULOCYTES NFR BLD AUTO: 0.2 % — SIGNIFICANT CHANGE UP (ref 0–0.9)
INR BLD: 1.1 RATIO — SIGNIFICANT CHANGE UP (ref 0.88–1.16)
KETONES UR-MCNC: ABNORMAL
LACTATE SERPL-SCNC: 1.7 MMOL/L — SIGNIFICANT CHANGE UP (ref 0.7–2)
LACTATE SERPL-SCNC: 2.4 MMOL/L — HIGH (ref 0.7–2)
LEUKOCYTE ESTERASE UR-ACNC: ABNORMAL
LIDOCAIN IGE QN: 162 U/L — SIGNIFICANT CHANGE UP (ref 73–393)
LYMPHOCYTES # BLD AUTO: 1.38 K/UL — SIGNIFICANT CHANGE UP (ref 1–3.3)
LYMPHOCYTES # BLD AUTO: 34.1 % — SIGNIFICANT CHANGE UP (ref 13–44)
MCHC RBC-ENTMCNC: 26.2 PG — LOW (ref 27–34)
MCHC RBC-ENTMCNC: 31 GM/DL — LOW (ref 32–36)
MCV RBC AUTO: 84.6 FL — SIGNIFICANT CHANGE UP (ref 80–100)
MONOCYTES # BLD AUTO: 0.27 K/UL — SIGNIFICANT CHANGE UP (ref 0–0.9)
MONOCYTES NFR BLD AUTO: 6.7 % — SIGNIFICANT CHANGE UP (ref 2–14)
NEUTROPHILS # BLD AUTO: 2.28 K/UL — SIGNIFICANT CHANGE UP (ref 1.8–7.4)
NEUTROPHILS NFR BLD AUTO: 56.3 % — SIGNIFICANT CHANGE UP (ref 43–77)
NITRITE UR-MCNC: POSITIVE
NRBC # BLD: 0 /100 WBCS — SIGNIFICANT CHANGE UP (ref 0–0)
NT-PROBNP SERPL-SCNC: 56 PG/ML — SIGNIFICANT CHANGE UP (ref 0–125)
PH UR: 9 — HIGH (ref 5–8)
PLATELET # BLD AUTO: 362 K/UL — SIGNIFICANT CHANGE UP (ref 150–400)
POTASSIUM SERPL-MCNC: 3.9 MMOL/L — SIGNIFICANT CHANGE UP (ref 3.5–5.3)
POTASSIUM SERPL-SCNC: 3.9 MMOL/L — SIGNIFICANT CHANGE UP (ref 3.5–5.3)
PROT SERPL-MCNC: 8 G/DL — SIGNIFICANT CHANGE UP (ref 6–8.3)
PROT UR-MCNC: 100
PROTHROM AB SERPL-ACNC: 12.9 SEC — SIGNIFICANT CHANGE UP (ref 10.5–13.4)
RAPID RVP RESULT: DETECTED
RBC # BLD: 4.23 M/UL — SIGNIFICANT CHANGE UP (ref 3.8–5.2)
RBC # FLD: 16.2 % — HIGH (ref 10.3–14.5)
RBC CASTS # UR COMP ASSIST: ABNORMAL /HPF (ref 0–4)
SARS-COV-2 RNA SPEC QL NAA+PROBE: SIGNIFICANT CHANGE UP
SODIUM SERPL-SCNC: 142 MMOL/L — SIGNIFICANT CHANGE UP (ref 135–145)
SP GR SPEC: 1.01 — SIGNIFICANT CHANGE UP (ref 1.01–1.02)
TRI-PHOS CRY UR QL COMP ASSIST: ABNORMAL
TROPONIN I, HIGH SENSITIVITY RESULT: 4.5 NG/L — SIGNIFICANT CHANGE UP
UROBILINOGEN FLD QL: NEGATIVE — SIGNIFICANT CHANGE UP
WBC # BLD: 4.05 K/UL — SIGNIFICANT CHANGE UP (ref 3.8–10.5)
WBC # FLD AUTO: 4.05 K/UL — SIGNIFICANT CHANGE UP (ref 3.8–10.5)
WBC UR QL: ABNORMAL

## 2022-11-04 PROCEDURE — 74176 CT ABD & PELVIS W/O CONTRAST: CPT | Mod: 26,MA

## 2022-11-04 PROCEDURE — 71045 X-RAY EXAM CHEST 1 VIEW: CPT | Mod: 26

## 2022-11-04 PROCEDURE — 99223 1ST HOSP IP/OBS HIGH 75: CPT | Mod: GC

## 2022-11-04 PROCEDURE — 99285 EMERGENCY DEPT VISIT HI MDM: CPT

## 2022-11-04 PROCEDURE — 71250 CT THORAX DX C-: CPT | Mod: 26,MA

## 2022-11-04 RX ORDER — ACETAMINOPHEN 500 MG
650 TABLET ORAL EVERY 6 HOURS
Refills: 0 | Status: DISCONTINUED | OUTPATIENT
Start: 2022-11-04 | End: 2022-11-10

## 2022-11-04 RX ORDER — MIRTAZAPINE 45 MG/1
15 TABLET, ORALLY DISINTEGRATING ORAL DAILY
Refills: 0 | Status: DISCONTINUED | OUTPATIENT
Start: 2022-11-04 | End: 2022-11-10

## 2022-11-04 RX ORDER — MEROPENEM 1 G/30ML
1000 INJECTION INTRAVENOUS EVERY 8 HOURS
Refills: 0 | Status: COMPLETED | OUTPATIENT
Start: 2022-11-04 | End: 2022-11-07

## 2022-11-04 RX ORDER — BACLOFEN 100 %
2.5 POWDER (GRAM) MISCELLANEOUS
Refills: 0 | Status: DISCONTINUED | OUTPATIENT
Start: 2022-11-04 | End: 2022-11-04

## 2022-11-04 RX ORDER — FOLIC ACID 0.8 MG
1 TABLET ORAL
Qty: 0 | Refills: 0 | DISCHARGE

## 2022-11-04 RX ORDER — MULTIVIT-MIN/FERROUS GLUCONATE 9 MG/15 ML
1 LIQUID (ML) ORAL DAILY
Refills: 0 | Status: DISCONTINUED | OUTPATIENT
Start: 2022-11-04 | End: 2022-11-10

## 2022-11-04 RX ORDER — MIRTAZAPINE 45 MG/1
1 TABLET, ORALLY DISINTEGRATING ORAL
Qty: 0 | Refills: 0 | DISCHARGE

## 2022-11-04 RX ORDER — SODIUM CHLORIDE 9 MG/ML
1000 INJECTION INTRAMUSCULAR; INTRAVENOUS; SUBCUTANEOUS ONCE
Refills: 0 | Status: COMPLETED | OUTPATIENT
Start: 2022-11-04 | End: 2022-11-04

## 2022-11-04 RX ORDER — ONDANSETRON 8 MG/1
4 TABLET, FILM COATED ORAL
Refills: 0 | Status: DISCONTINUED | OUTPATIENT
Start: 2022-11-04 | End: 2022-11-10

## 2022-11-04 RX ORDER — MIDODRINE HYDROCHLORIDE 2.5 MG/1
1 TABLET ORAL
Qty: 0 | Refills: 0 | DISCHARGE

## 2022-11-04 RX ORDER — MIDODRINE HYDROCHLORIDE 2.5 MG/1
5 TABLET ORAL
Refills: 0 | Status: DISCONTINUED | OUTPATIENT
Start: 2022-11-04 | End: 2022-11-10

## 2022-11-04 RX ORDER — OMEPRAZOLE 10 MG/1
1 CAPSULE, DELAYED RELEASE ORAL
Qty: 0 | Refills: 0 | DISCHARGE

## 2022-11-04 RX ORDER — BACLOFEN 100 %
2.5 POWDER (GRAM) MISCELLANEOUS
Refills: 0 | Status: DISCONTINUED | OUTPATIENT
Start: 2022-11-04 | End: 2022-11-10

## 2022-11-04 RX ORDER — FAMOTIDINE 10 MG/ML
20 INJECTION INTRAVENOUS DAILY
Refills: 0 | Status: DISCONTINUED | OUTPATIENT
Start: 2022-11-04 | End: 2022-11-10

## 2022-11-04 RX ORDER — PREGABALIN 225 MG/1
0 CAPSULE ORAL
Qty: 0 | Refills: 0 | DISCHARGE

## 2022-11-04 RX ORDER — SERTRALINE 25 MG/1
1 TABLET, FILM COATED ORAL
Qty: 0 | Refills: 0 | DISCHARGE

## 2022-11-04 RX ORDER — ENOXAPARIN SODIUM 100 MG/ML
40 INJECTION SUBCUTANEOUS EVERY 24 HOURS
Refills: 0 | Status: DISCONTINUED | OUTPATIENT
Start: 2022-11-04 | End: 2022-11-10

## 2022-11-04 RX ORDER — ONDANSETRON 8 MG/1
2 TABLET, FILM COATED ORAL
Qty: 0 | Refills: 0 | DISCHARGE

## 2022-11-04 RX ORDER — ROTIGOTINE 8 MG/24H
1 PATCH, EXTENDED RELEASE TRANSDERMAL EVERY 24 HOURS
Refills: 0 | Status: DISCONTINUED | OUTPATIENT
Start: 2022-11-05 | End: 2022-11-10

## 2022-11-04 RX ORDER — AZTREONAM 2 G
1000 VIAL (EA) INJECTION ONCE
Refills: 0 | Status: COMPLETED | OUTPATIENT
Start: 2022-11-04 | End: 2022-11-04

## 2022-11-04 RX ORDER — POLYETHYLENE GLYCOL 3350 17 G/17G
17 POWDER, FOR SOLUTION ORAL DAILY
Refills: 0 | Status: DISCONTINUED | OUTPATIENT
Start: 2022-11-04 | End: 2022-11-10

## 2022-11-04 RX ORDER — ROTIGOTINE 8 MG/24H
1 PATCH, EXTENDED RELEASE TRANSDERMAL
Qty: 0 | Refills: 0 | DISCHARGE

## 2022-11-04 RX ORDER — SODIUM HYPOCHLORITE 0.125 %
1 SOLUTION, NON-ORAL MISCELLANEOUS
Refills: 0 | Status: DISCONTINUED | OUTPATIENT
Start: 2022-11-04 | End: 2022-11-10

## 2022-11-04 RX ORDER — MAGNESIUM HYDROXIDE 400 MG/1
30 TABLET, CHEWABLE ORAL DAILY
Refills: 0 | Status: DISCONTINUED | OUTPATIENT
Start: 2022-11-04 | End: 2022-11-10

## 2022-11-04 RX ADMIN — SODIUM CHLORIDE 1000 MILLILITER(S): 9 INJECTION INTRAMUSCULAR; INTRAVENOUS; SUBCUTANEOUS at 16:30

## 2022-11-04 RX ADMIN — Medication 50 MILLIGRAM(S): at 18:34

## 2022-11-04 RX ADMIN — MEROPENEM 100 MILLIGRAM(S): 1 INJECTION INTRAVENOUS at 23:30

## 2022-11-04 RX ADMIN — SODIUM CHLORIDE 1000 MILLILITER(S): 9 INJECTION INTRAMUSCULAR; INTRAVENOUS; SUBCUTANEOUS at 15:28

## 2022-11-04 NOTE — ED PROVIDER NOTE - NSICDXPASTSURGICALHX_GEN_ALL_CORE_FT
PAST SURGICAL HISTORY:  Abdominal hernia with mesh, 2003    Basal cell carcinoma removal, mid vertex scalp, 2002    H/O colonoscopy , 2008 polypectomy, ,     H/O ovarian cystectomy right,     History of  10/1982    S/P deep brain stimulator placement 2006, 2 brain pacemakers Model #[id number], batteries replaced 2014

## 2022-11-04 NOTE — H&P ADULT - PROBLEM SELECTOR PLAN 3
Pt w/ congestive cough and noted diffuse rhonchi on physical exam, denies SOB  - RVP +influenza  - CT chest showed copious central airway secretions. Patchy left lower lobe and lingular opacities may represent combination of mucoid impaction and infection/pneumonia. Mild right basilar atelectasis. Coronary artery calcifications. Aortic valve calcifications.  - Currently satting well on 2L O2 nasal cannula  - Supplemental O2 PRN, wean and titrated as needed  - Monitor respiratory status  - Continuous pulse ox Pt w/ congestive cough and noted diffuse rhonchi on physical exam, denies SOB  - RVP +influenza  - CT chest showed copious central airway secretions. Patchy left lower lobe and lingular opacities may represent combination of mucoid impaction and infection/pneumonia. Mild right basilar atelectasis. Coronary artery calcifications. Aortic valve calcifications.  - Tamiflu possibly pending recs  - Currently satting well on 2L O2 nasal cannula  - Supplemental O2 PRN, wean and titrated as needed  - Monitor respiratory status  - Continuous pulse ox  - ID Dr. Johns consulted, f/u recs Pt w/ congestive cough and noted diffuse rhonchi on physical exam, denies SOB  - RVP +influenza  - CT chest showed copious central airway secretions. Patchy left lower lobe and lingular opacities may represent combination of mucoid impaction and infection/pneumonia. Mild right basilar atelectasis. Coronary artery calcifications. Aortic valve calcifications.  - Start Tamiflu for 5 days  - Currently satting well on 2L O2 nasal cannula  - Supplemental O2 PRN, wean and titrated as needed  - Monitor respiratory status  - Continuous pulse ox  - ID Dr. Johns consulted, f/u recs

## 2022-11-04 NOTE — ED ADULT NURSE NOTE - CAS EDN DISCHARGE INTERVENTIONS
What Type Of Note Output Would You Prefer (Optional)?: Standard Output
How Severe Are Your Spot(S)?: moderate
Hpi Title: Evaluation of Skin Lesions
Arm band on/IV intact/Admission wristband placed

## 2022-11-04 NOTE — H&P ADULT - PROBLEM SELECTOR PLAN 2
Pt w/ chronic jimenez, replaced in the ED  - CT abd/pelvis showed bilateral nonobstructing calculi, staghorn in appearance, extending from the collecting systems to the renal pelvises. No hydronephrosis or kidneys. Findings are concerning for urinary bladder cystitis/urinary tract infection. Soft tissue thickening with ulceration at the sacrococcygeal region.  - UA +nitrite, mod leuk est, pH 9.0, mod blood, many bacteria  - WBC 11.1 and Lactate 2.4 -> 1.7 on admission  - Given 1L NS bolus and 1g Aztreonam in the ED  - Start on   - Tylenol 650mg PRN for fever and pain  - F/u UCx, BCx x2  - Trend WBC and monitor for fever Pt w/ chronic jimenez, replaced in the ED  - Pt recently found to have ESBL proteus on recent admission 9/2022 and treated with ertapenem and meropenem  - CT abd/pelvis showed bilateral nonobstructing calculi, staghorn in appearance, extending from the collecting systems to the renal pelvises. No hydronephrosis or kidneys. Findings are concerning for urinary bladder cystitis/urinary tract infection. Soft tissue thickening with ulceration at the sacrococcygeal region.  - UA +nitrite, mod leuk est, pH 9.0, mod blood, many bacteria  - WBC 11.1 and Lactate 2.4 -> 1.7 on admission  - Given 1L NS bolus and 1g Aztreonam in the ED  - Start on meropenem 1g q8h  - Tylenol 650mg PRN for fever and pain  - F/u UCx, BCx x2  - Trend WBC and monitor for fever  - ID Dr. Johns consulted, f/u recs Pt w/ chronic jimenez, replaced in the ED  - Pt recently found to have ESBL proteus on recent admission 9/2022 and treated with ertapenem and meropenem  - CT abd/pelvis showed bilateral nonobstructive calculi, staghorn in appearance, extending from the collecting systems to the renal pelvises. No hydronephrosis or kidneys. Findings are concerning for urinary bladder cystitis/urinary tract infection. Soft tissue thickening with ulceration at the sacrococcygeal region.  - UA +nitrite, mod leuk est, pH 9.0, mod blood, many bacteria  - WBC 11.1 and Lactate 2.4 -> 1.7 on admission  - Given 1L NS bolus and 1g Aztreonam in the ED  - Start on meropenem 1g q8h  - Tylenol 650mg PRN for fever and pain  - F/u UCx, BCx x2  - Trend WBC and monitor for fever  - ID Dr. Johns consulted, f/u recs

## 2022-11-04 NOTE — H&P ADULT - PROBLEM SELECTOR PLAN 1
Pt w/ both chronic G-tube and GJ-tube  - Presented initially with clogged G-tube which was resolved in ED, however CT noted disconnected GJ tube  - CT abd/pelvis showed G tube localized to stomach. Second G tube localized to stomach with separate catheter localized to the mid left abdomen small bowel, possibly a GJ tube that has disconnected.  - NPO pending surgery recs  - Resume tube feeds when possible with Jevity 1.5mg 60cc/hr x14hrs (from 16:00 to 6:00) with 25mL free water flush every hour (from 16:00 to 6:00) and 200mL water bolus flush q8h  - GI Dr. Stringer consulted and following  - Surgery Dr. Saeed consulted, f/u recs Pt w/ both chronic G-tube and GJ-tube  - Presented initially with clogged G-tube which was resolved in ED, however CT noted disconnected GJ tube  - CT abd/pelvis showed G tube localized to stomach. Second G tube localized to stomach with separate catheter localized to the mid left abdomen small bowel, possibly a GJ tube that has disconnected.  - NPO except meds for now pending recs from GI and surgery  - Resume tube feeds when possible with Jevity 1.5mg 60cc/hr x14hrs (from 16:00 to 6:00) with 25mL free water flush every hour (from 16:00 to 6:00) and 200mL water bolus flush q8h  - NOTE: please use PEG tube for all meds and tube feeds. GJ tube is ONLY used for duopa - and currently NOT usable until further evaluation and workup.  - GI Dr. Stringer consulted and following  - Surgery Dr. Saeed consulted, f/u recs

## 2022-11-04 NOTE — H&P ADULT - NSHPSOCIALHISTORY_GEN_ALL_CORE
Living Situation: lives at [facility]  ADLs/Mobility: bedbound  Tobacco Use: denies  Alcohol Use: denies  Drug Use: denies  Vaccination: covid vaccinated pfizer

## 2022-11-04 NOTE — ED PROVIDER NOTE - CARE PLAN
Principal Discharge DX:	Acute UTI  Secondary Diagnosis:	Pneumonia  Secondary Diagnosis:	Foreign body in colon   1

## 2022-11-04 NOTE — ED ADULT NURSE NOTE - CHIEF COMPLAINT QUOTE
Patient BIBA from [facility] for clogged g-tube. EMS states that the patient was also found to be hypoxic on scene to 89%. Improved with 4L NC.  accompanying patient.

## 2022-11-04 NOTE — CONSULT NOTE ADULT - SUBJECTIVE AND OBJECTIVE BOX
[facility]  Nahid Shafer PA-C  [address] [place] [id number] [phone]      Chief Complaint:  Patient is a 72y old  Female who presents with a chief complaint of clogged peg    HPI:  72-year-old female with history of Parkinson's, constipation, depression, GERD, arthritis bedbound with feeding tube presents with brought in by  due to clogged feeding tube.  Patient has an original feeding tube or past 6 months.  No known fever or chills.  No cough/URI.  Patient without any known complaints.  Patient was found to be hypoxic by EMS, improved with oxygen.  No recent travel.  No aggravating or alleviating factors otherwise noted.  Patient vaccinated for COVID.  No other acute complaints.    INTERVAL HPI:  Pt seen in emergency department with  at bedside  She is unable to provide history given baseline mental status; hx obtained from   He reports same history as above  Peg was placed in January at [facility]  Otherwise no new GI complaints    Allergies:  Ceclor (Rash)  IV Contrast (Hypotension)  latex (Rash; Urticaria)      Medications:      PMHX/PSHX:  Constipation    Unilateral primary osteoarthritis, left hip    Parkinson&#x27;s disease    Basal cell carcinoma    GERD (gastroesophageal reflux disease)    Seasonal allergies    Depression    Acute respiratory failure with hypoxia    S/P deep brain stimulator placement    History of     H/O ovarian cystectomy    H/O colonoscopy    Basal cell carcinoma    Abdominal hernia        Family history:  No pertinent family history in first degree relatives      ROS:   Unable to obtain due to pt's mental status      PHYSICAL EXAM:   Vital Signs:  Vital Signs Last 24 Hrs  T(C): 36.3 (2022 14:20), Max: 36.3 (2022 14:20)  T(F): 97.3 (2022 14:20), Max: 97.3 (2022 14:20)  HR: 84 (2022 14:20) (84 - 84)  BP: 121/78 (2022 14:20) (121/78 - 121/78)  BP(mean): --  RR: 18 (2022 14:20) (18 - 18)  SpO2: 95% (2022 14:20) (95% - 95%)    Parameters below as of 2022 14:20  Patient On (Oxygen Delivery Method): nasal cannula  O2 Flow (L/min): 4    Daily Height in cm: 165.1 (2022 14:20)    Daily     GENERAL:  Appears stated age  ABDOMEN:  Soft, non-tender, non-distended, +peg  NEURO:  Awake, confused

## 2022-11-04 NOTE — CONSULT NOTE ADULT - ASSESSMENT
Clogged peg    S/p bedside peg cleaning  Ok to use peg for meds and feeds  Please flush peg after use  If being admitted, will follow  D/w  at bedside    I reviewed the overnight course of events on the unit, re-confirming the patient history. I discussed the care with the patient and their family  Differential diagnosis and plan of care discussed with patient after the evaluation  35 minutes spent on total encounter of which more than fifty percent of the encounter was spent counseling and/or coordinating care by the attending physician.  Advanced care planning was discussed with patient and family.  Advanced care planning forms were reviewed and discussed.  Risks, benefits and alternatives of gastroenterologic procedures were discussed in detail and all questions were answered.   Clogged peg    S/p bedside peg cleaning  Ok to use peg for meds and feeds  Please flush peg after use  Follow CT  If being admitted, will follow  D/w  at bedside    I reviewed the overnight course of events on the unit, re-confirming the patient history. I discussed the care with the patient and their family  Differential diagnosis and plan of care discussed with patient after the evaluation  35 minutes spent on total encounter of which more than fifty percent of the encounter was spent counseling and/or coordinating care by the attending physician.  Advanced care planning was discussed with patient and family.  Advanced care planning forms were reviewed and discussed.  Risks, benefits and alternatives of gastroenterologic procedures were discussed in detail and all questions were answered.

## 2022-11-04 NOTE — ED PROVIDER NOTE - PROGRESS NOTE DETAILS
Patient seen by gastroenterology, who was able to flush the feeding tube, meds are working well.  Patient awaiting rest of work-up. [facility] resident, accepts admit to Dr Raul Zamarripa

## 2022-11-04 NOTE — H&P ADULT - NSHPREVIEWOFSYSTEMS_GEN_ALL_CORE
Limited ROS based on patient's status    CONSTITUTIONAL: Denies fevers, or chills. Denies headaches.  EYES/ENT: Denies vertigo.  NECK: Denies pain or stiffness.  RESPIRATORY: Denies shortness of breath. +Cough  CARDIOVASCULAR: Denies chest pain or palpitations.  GASTROINTESTINAL: Denies abdominal pain. Denies nausea, vomiting. +Chronic constipation  GENITOURINARY: Denies any urinary discomfort at jimenez insertion. +Jimenez  EXTREMITIES: Denies any pain. +Unable to move extremities  NEUROLOGICAL: Denies numbness.

## 2022-11-04 NOTE — ED PROVIDER NOTE - OBJECTIVE STATEMENT
72-year-old female with history of Parkinson's, constipation, depression, GERD, arthritis bedbound with feeding tube presents with brought in by  due to clogged feeding tube.  Patient has an original feeding tube or past 6 months.  No known fever or chills.  No cough/URI.  Patient without any known complaints.  Patient was found to be hypoxic by EMS, improved with oxygen.  No recent travel.  No aggravating or alleviating factors otherwise noted.  Patient vaccinated for COVID.  No other acute complaints.

## 2022-11-04 NOTE — H&P ADULT - NSICDXPASTSURGICALHX_GEN_ALL_CORE_FT
PAST SURGICAL HISTORY:  Abdominal hernia with mesh, 2003    Basal cell carcinoma removal, mid vertex scalp, 2002    H/O colonoscopy , 2008 polypectomy, ,     H/O ovarian cystectomy right,     History of  10/1982    S/P deep brain stimulator placement 2006, 2 brain pacemakers Model #52458, batteries replaced 2014

## 2022-11-04 NOTE — CONSULT NOTE ADULT - ASSESSMENT
72 year old female from [facility] with PMH of Parkinson's, constipation, depression, GERD, arthritis, h/o colon resection for prolapsed rectum and ventral hernia repair with mesh (, Dr. Ferreira), ex lap with ANNI for SBO (2022, Dr. Nuñez), L THR (),  (), R ovarian cystectomy (), bedbound with feeding PEG tube (placed 2022, [facility]), and GJ-tube for carbidopa/levodopa pump (placed  by Dr. Oh), with broken GJ tube.

## 2022-11-04 NOTE — H&P ADULT - ASSESSMENT
72y Female with PMHx of terminal Parkinson's (bedbound), constipation, depression, arthritis, hx of partial colon resection presenting with clogged PEG G-tube being admitted for disconnected GJ-tube and UTI.

## 2022-11-04 NOTE — ED ADULT TRIAGE NOTE - CHIEF COMPLAINT QUOTE
Patient BIBA from [facility] for clogged g-tube. EMS states that the patient was also found to be hypoxic on scene to 89%. Improved with 4L NC.  accompanying patient.

## 2022-11-04 NOTE — ED PROVIDER NOTE - CLINICAL SUMMARY MEDICAL DECISION MAKING FREE TEXT BOX
Feeding tube issue with abd tenderness and hypoxia noted. check labs, CT CAP, XR, GI consult, IV, UA, reeval

## 2022-11-04 NOTE — ED ADULT TRIAGE NOTE - WILL THE PATIENT ACCEPT THE PFIZER COVID-19 VACCINE IF ELIGIBLE AND IT IS AVAILABLE?
No [Follow-Up - Clinic] : a clinic follow-up of [Coronary Artery Disease] : coronary artery disease [Hyperlipidemia] : hyperlipidemia [Hypertension] : hypertension [FreeTextEntry2] : s/p stent 8/14, pt admits to occas, dyspnea, no chg [FreeTextEntry1] : occasional SCOTT, no SSCP, less edema , post stents, ptca

## 2022-11-04 NOTE — H&P ADULT - ATTENDING COMMENTS
72y Female with PMHx of terminal Parkinson's (bedbound), constipation, depression, arthritis, hx of partial colon resection presenting with clogged PEG G-tube being admitted for disconnected GJ-tube and UTI.    Agree with H&P as above. Edited where appropriate directly into the body of the note.

## 2022-11-04 NOTE — ED PROVIDER NOTE - WR INTERPRETED BY 1
Kranthi Burden Dermal Autograft Text: The defect edges were debeveled with a #15 scalpel blade.  Given the location of the defect, shape of the defect and the proximity to free margins a dermal autograft was deemed most appropriate.  Using a sterile surgical marker, the primary defect shape was transferred to the donor site. The area thus outlined was incised deep to adipose tissue with a #15 scalpel blade.  The harvested graft was then trimmed of adipose and epidermal tissue until only dermis was left.  The skin graft was then placed in the primary defect and oriented appropriately.

## 2022-11-04 NOTE — H&P ADULT - NSHPPHYSICALEXAM_GEN_ALL_CORE
VITALS:   T(C): 36.9 (11-04-22 @ 16:53), Max: 36.9 (11-04-22 @ 16:53)  HR: 82 (11-04-22 @ 16:53) (82 - 84)  BP: 124/74 (11-04-22 @ 16:53) (121/78 - 124/74)  RR: 18 (11-04-22 @ 16:53) (18 - 18)  SpO2: 94% (11-04-22 @ 16:53) (94% - 95%)    PHYSICAL EXAM:  General: Lying in bed comfortably. No acute distress. Pleasant.  Head: Atraumatic, normocephalic.  Eyes: EOMI, PERRLA. Conjunctiva and sclera clear.  ENT: Moist mucous membranes. No exudates.   Neck: Supple. No obvious JVD.  Heart: Normal S1, S2. Regular rate and rhythm. No murmurs, rubs, or gallops.  Lungs: +Diffuse rhonchi bilaterally, cough, congested  Abdomen: Soft, nondistended. Normoactive bowel sounds present. +Gtube, GJtube +Pt noted pain on palpation in lower quadrants +Rash noted on abdomen  Extremities: No cyanosis. No edema. +Contracted LE, pt noted discomfort on calf squeezing  Nervous System: Answers questions appropriately. Follow commands. Unable to move all 4 extremities.  Skin: Warm skin, appears well perfused. VITALS:   T(C): 36.9 (11-04-22 @ 16:53), Max: 36.9 (11-04-22 @ 16:53)  HR: 82 (11-04-22 @ 16:53) (82 - 84)  BP: 124/74 (11-04-22 @ 16:53) (121/78 - 124/74)  RR: 18 (11-04-22 @ 16:53) (18 - 18)  SpO2: 94% (11-04-22 @ 16:53) (94% - 95%)  PHYSICAL EXAM:  General: Lying in bed comfortably. No acute distress. Pleasant.  Head: Atraumatic, normocephalic.  Eyes: EOMI, PERRLA. Conjunctiva and sclera clear.  ENT: Moist mucous membranes. No exudates.   Neck: Supple. No obvious JVD.  Heart: Normal S1, S2. Regular rate and rhythm. No murmurs, rubs, or gallops.  Lungs: +Diffuse rhonchi bilaterally, cough, congested  Abdomen: Soft, nondistended. Normoactive bowel sounds present. +Gtube, GJtube +Pt noted pain on palpation in lower quadrants +Rash noted on abdomen  Extremities: No cyanosis. No edema. +Contracted LE, pt noted discomfort on calf squeezing  Nervous System: Answers questions appropriately. Follow commands. Unable to move all 4 extremities.  Skin: Warm skin, appears well perfused.

## 2022-11-04 NOTE — CONSULT NOTE ADULT - PROBLEM SELECTOR RECOMMENDATION 9
- No emergent surgical intervention at this time as patient without evidence of obstruction.  - Conservative management; monitor for passage of dislodged fragment of GJ tube.  - Discussed with Dr. Saeed

## 2022-11-04 NOTE — ED ADULT NURSE NOTE - LOCATION
Intake Diagnosis & Plan    Name of Physician Contacted: Dr. Silver    Physicians Recommended Level of Care: PHP    Placement Patient Agrees To: [facility], [facility]    Patient Declined / AMA Signed:      Comments:      Reasons for Level of Care    Reason(s) for Inpatient Treatment:      Reason(s) for Partial Day Treatment: Diminished ability to think, concentrate, make decisions, or maintain mood nearly every day, High likelihood for relapse without structured program support, Impaired school/social/family/occupational/legal functioning, Inability to make behavioral changes without clinically directed and repeated structured interventions, Patient unable to maintain a self-directed recovery plan    Reason(s) for Residential Treatment:      Reason(s) for Outpatient or Intensive Outpatient Treatment:      ICD 10 Diagnosis: Other (comment) (schizophreniform psychosis)    Referral Source Notified:      Intake Assessment Summary : patient referred from inpatient for ongoing care after hospitalization for increased paranoia (parents were going to be murdered and he feels he is targeted as a terrorist by the [employer].)  He will attend [facility] for ongoing medication and symptoms monitoring.      
abdomen

## 2022-11-04 NOTE — CONSULT NOTE ADULT - SUBJECTIVE AND OBJECTIVE BOX
HPI:  72 year old female with PMH of Parkinson's, constipation, depression, GERD, arthritis, h/o colon resection for prolapsed rectum and ventral hernia repair with mesh (, Dr. Ferreira), ex lap with ANNI for SBO (2022, Dr. Nuñez), L THR (),  (), R ovarian cystectomy (), bedbound with feeding PEG tube (placed 2022, [facility] [place]), and GJ-tube (placed  by Dr. Oh), brought by  due to clogged PEG tube. Patient seen by GI and unclogged PEG tube, however incidental finding of a broken GJ tube. Per  at bedside, patient admitted to [facility] in 2022 for SBO, underwent ex lap with ANNI with Dr. Nuñez and had extensive postoperative course in ICU lasting 27 days.  Patient unable to provide meaningful history secondary to dementia. History obtained from  and chart review.    PAST MEDICAL & SURGICAL HISTORY:  Constipation  Unilateral primary osteoarthritis, left hip  Parkinson's disease  Basal cell carcinoma, scalp  GERD (gastroesophageal reflux disease)  Seasonal allergies  Depression  Acute respiratory failure with hypoxia  S/P deep brain stimulator placement, 2006, 2 brain pacemakers Model #21611, batteries replaced 2014  History of , 10/1982  H/O ovarian cystectomy, right,   H/O colonoscopy, , 2008 polypectomy, , 2017  Basal cell carcinoma, removal, mid vertex scalp, 2002  Abdominal hernia, with mesh, 2003    REVIEW OF SYSTEMS:  CONSTITUTIONAL: No weakness, fevers or chills  EYES/ENT: No visual changes;  No vertigo or throat pain   NECK: No pain or stiffness  RESPIRATORY: No cough, wheezing, hemoptysis; No shortness of breath  CARDIOVASCULAR: No chest pain or palpitations  GASTROINTESTINAL: No abdominal or epigastric pain. No nausea, vomiting, or hematemesis; No diarrhea or constipation. No melena or hematochezia.  GENITOURINARY: No dysuria, frequency or hematuria  NEUROLOGICAL: No numbness or weakness  SKIN: No itching, burning, rashes, or lesions   All other review of systems is negative unless indicated above.    MEDICATIONS:  MEDICATIONS  (STANDING):  aztreonam  IVPB 1000 milliGRAM(s) IV Intermittent once    ALLERGIES:  Ceclor (Rash)  IV Contrast (Hypotension)  latex (Rash; Urticaria)    SOCIAL HISTORY:  Nonsmoker.  No ETOH or illicit drug use.    VITAL SIGNS:  Vital Signs Last 24 Hrs  T(C): 36.9 (2022 16:53), Max: 36.9 (2022 16:53)  T(F): 98.4 (2022 16:53), Max: 98.4 (2022 16:53)  HR: 82 (2022 16:53) (82 - 84)  BP: 124/74 (2022 16:53) (121/78 - 124/74)  RR: 18 (2022 16:53) (18 - 18)  SpO2: 94% (2022 16:53) (94% - 95%)    PHYSICAL EXAM:      LABS:                        11.1   4.05  )-----------( 362      ( 2022 15:25 )             35.8     11-04    142  |  107  |  20  ----------------------------<  96  3.9   |  29  |  0.36<L>    Ca    9.5      2022 15:25    TPro  8.0  /  Alb  3.1<L>  /  TBili  0.3  /  DBili  x   /  AST  14<L>  /  ALT  11<L>  /  AlkPhos  90  11-04    LIVER FUNCTIONS - ( 2022 15:25 )  Alb: 3.1 g/dL / Pro: 8.0 g/dL / ALK PHOS: 90 U/L / ALT: 11 U/L / AST: 14 U/L / GGT: x           PT/INR - ( 2022 15:25 )   PT: 12.9 sec;   INR: 1.10 ratio  PTT - ( 2022 15:25 )  PTT:33.7 sec    RADIOLOGY & ADDITIONAL STUDIES: HPI:  72 year old female from [facility] with PMH of Parkinson's, constipation, depression, GERD, arthritis, h/o colon resection for prolapsed rectum and ventral hernia repair with mesh (, Dr. Ferreira), ex lap with ANNI for SBO (2022, Dr. Nuñez), L THR (),  (), R ovarian cystectomy (), bedbound with feeding PEG tube (placed 2022, [facility] [place]), and GJ-tube for carbidopa/levodopa pump (placed  by Dr. Oh), brought by  due to clogged PEG tube. Patient seen by GI and unclogged PEG tube, however incidental finding of a broken GJ tube. Per  at bedside, patient admitted to [facility] in 2022 for SBO, underwent ex lap with ANNI with Dr. Nuñez and had extensive postoperative course in ICU lasting 27 days. He states GJ tube must be working since patient has not been having Parkinson's symptoms. Call placed to nursing home, patient had a large BM today.  Patient unable to provide meaningful history secondary to dementia. History obtained from , chart review, and nursing home.    PAST MEDICAL & SURGICAL HISTORY:  Constipation  Unilateral primary osteoarthritis, left hip  Parkinson's disease  Basal cell carcinoma, scalp  GERD (gastroesophageal reflux disease)  Seasonal allergies  Depression  Acute respiratory failure with hypoxia  S/P deep brain stimulator placement, 2006, 2 brain pacemakers Model #81166, batteries replaced 2014  History of , 10/1982  H/O ovarian cystectomy, right,   H/O colonoscopy, , 2008 polypectomy, ,   Basal cell carcinoma, removal, mid vertex scalp, 2002  Abdominal hernia, with mesh, 2003    REVIEW OF SYSTEMS:  Unable to obtain ROS secondary to patient's mental status.    MEDICATIONS:  MEDICATIONS  (STANDING):  aztreonam  IVPB 1000 milliGRAM(s) IV Intermittent once    ALLERGIES:  Ceclor (Rash)  IV Contrast (Hypotension)  latex (Rash; Urticaria)    SOCIAL HISTORY:  Nonsmoker.  No ETOH or illicit drug use.    VITAL SIGNS:  Vital Signs Last 24 Hrs  T(C): 36.9 (2022 16:53), Max: 36.9 (2022 16:53)  T(F): 98.4 (2022 16:53), Max: 98.4 (2022 16:53)  HR: 82 (2022 16:53) (82 - 84)  BP: 124/74 (2022 16:53) (121/78 - 124/74)  RR: 18 (2022 16:53) (18 - 18)  SpO2: 94% (2022 16:53) (94% - 95%)    PHYSICAL EXAM:  GENERAL: Elderly female lying in bed in NAD.  HEENT:  NC/AT.  CARDIO:  Regular rate and rhythm.   RESPIRATORY:  Nonlabored breathing, no accessory muscle use.  ABDOMEN:  Soft, nondistended, nontender. Healed midline incision. Peg tube in place. GJ tube in place with connection to carbidopa/levodopa pump.  No rebound tenderness or guarding.  SKIN:  No jaundice, pallor, or cyanosis  NEURO:  Alert to voice, unresponsive to questions.    LABS:                     11.1   4.05  )-----------( 362      ( 2022 15:25 )             35.8     11-04    142  |  107  |  20  ----------------------------<  96  3.9   |  29  |  0.36<L>    Ca    9.5      2022 15:25    TPro  8.0  /  Alb  3.1<L>  /  TBili  0.3  /  DBili  x   /  AST  14<L>  /  ALT  11<L>  /  AlkPhos  90  11-04    LIVER FUNCTIONS - ( 2022 15:25 )  Alb: 3.1 g/dL / Pro: 8.0 g/dL / ALK PHOS: 90 U/L / ALT: 11 U/L / AST: 14 U/L / GGT: x           PT/INR - ( 2022 15:25 )   PT: 12.9 sec;   INR: 1.10 ratio  PTT - ( 2022 15:25 )  PTT:33.7 sec    RADIOLOGY & ADDITIONAL STUDIES:  < from: CT Abdomen and Pelvis No Cont (22 @ 16:48) >  BOWEL: Limited evaluation due to lack of oral contrast. There appear to   be 2 gastrostomy tubes localized to the stomach. The stomach is   underdistended. There is no small bowel distention. However, there is a   long tubular catheter localized to the left mid abdominal small bowel,   without connection to the abdominal wall, possibly a GJ tube that has   disconnected. Appendix is noninflamed in nondistended. Moderate stool   burden of the colon and rectum limits evaluation of the mucosa.  PERITONEUM: No ascites. No free air.    IMPRESSION    ABDOMEN/PELVIS:  1. G tube localized to stomach. Second G tube localized to stomach with   separate catheter localized to the mid left abdomen small bowel, possibly   a GJ tube that has disconnected. Correlate with clinical history. No   small bowel distention. No free air.  2. Moderate stool burden in the colon and rectum. Correlate for   constipation.  3. Mild inflammation of the urinary bladder. Bilateral non-obstructing   calculi, staghorn in appearance, extending from the collecting systems to   the renal pelvises. No hydronephrosis or kidneys. Findings are concerning   for urinary bladder cystitis/urinary tract infection. Correlate with   urinalysis.  4. Soft tissue thickening with ulceration at the sacrococcygeal region.   Correlate with direct visualization for decubitus ulcer.

## 2022-11-04 NOTE — ED ADULT NURSE REASSESSMENT NOTE - NS ED NURSE REASSESS COMMENT FT1
pt alert, seen by surgical PA, jimenez cath changed, # 16, tolerated well, specimen to lab, tolerated well

## 2022-11-04 NOTE — H&P ADULT - PROBLEM SELECTOR PLAN 4
Terminal Parkinson's disease  - Pt is bedbound and unable to move extremities  - Continue home duopa  - Continue home baclofen for spasticity  - Continue home famotidine  - Aspiration precautions  - Fall risk precautions Terminal Parkinson's disease  - Pt is bedbound and unable to move extremities  - Continue home neupro patch  - Continue home baclofen for spasticity  - Continue home famotidine  - HOLD duopa for now - because this medication is administered through GJ tube which is currently not functioning - spouse has brought it from home - will need to call pharmacy to free-text enter it once GJ tube is usable again  - Aspiration precautions  - Fall risk precautions

## 2022-11-04 NOTE — H&P ADULT - HISTORY OF PRESENT ILLNESS
The patient is a 72y Female with PMHx of terminal Parkinson's (bedbound), constipation, depression, arthritis, hx of partial colon resection presenting with clogged PEG tube. Patient resides at [facility] and was initally sent in for evaluation of clogged G-tube. Patient is unable to provide much information so history retrieved primarily from chart and  at bedside.  was called this morning for clogged G-tube. In the ED, GI was consulted and was able to fix the G-tube without issues. On repeat CT scan - it was shown that pt had a possible disconnected GJ tube. Patient herself is currently not complaining of any pain or discomfort although noted to have a congested cough. Denies any shortness of breath, chest pain, abdominal pain, urinary discomfort, fevers, or chills.    In the ED,  VS: Temp 98.4F, HR 82, Bp 124/74, RR 18, SpO2 94% on 2L NC  Labs: WBC 11.1, Lactate 2.4 -> 1.7, Alb 3.1  UA +nitrite, mod leuk est, pH 9.0, mod blood, many bacteria  RVP +influenza  Imaging:   CT chest showed copious central airway secretions. Patchy left lower lobe and lingular opacities may represent combination of mucoid impaction and infection/pneumonia. Mild right basilar atelectasis. Coronary artery calcifications. Aortic valve calcifications.  CT abd/pelvis showed G tube localized to stomach. Second G tube localized to stomach with separate catheter localized to the mid left abdomen small bowel, possibly a GJ tube that has disconnected. Moderate stool burden in the colon and rectum. Mild inflammation of the urinary bladder. Bilateral nonobstructing calculi, staghorn in appearance, extending from the collecting systems to the renal pelvises. No hydronephrosis or kidneys. Findings are concerning for urinary bladder cystitis/urinary tract infection. Soft tissue thickening with ulceration at the sacrococcygeal region.  EKG: Pending  Received: 1L NS bolus, 1g Aztreonam IV The patient is a 72y Female with PMHx of terminal Parkinson's (bedbound), constipation, depression, arthritis, hx of partial colon resection presenting with clogged PEG tube. Patient resides at [facility] and was initally sent in for evaluation of clogged G-tube. Patient is unable to provide much information so history retrieved primarily from chart and  at bedside.  was called this morning for clogged G-tube. In the ED, GI was consulted and was able to fix the G-tube without issues. On repeat CT scan - it was shown that pt had a possible disconnected GJ tube. Patient herself is currently not complaining of any pain or discomfort although noted to have a congested cough. Denies any shortness of breath, chest pain, abdominal pain, urinary discomfort, fevers, or chills.  In the ED,  VS: Temp 98.4F, HR 82, Bp 124/74, RR 18, SpO2 94% on 2L NC  Labs: WBC 11.1, Lactate 2.4 -> 1.7, Alb 3.1  UA +nitrite, mod leuk est, pH 9.0, mod blood, many bacteria  RVP +influenza  Imaging:   CT chest showed copious central airway secretions. Patchy left lower lobe and lingular opacities may represent combination of mucoid impaction and infection/pneumonia. Mild right basilar atelectasis. Coronary artery calcifications. Aortic valve calcifications.  CT abd/pelvis showed G tube localized to stomach. Second G tube localized to stomach with separate catheter localized to the mid left abdomen small bowel, possibly a GJ tube that has disconnected. Moderate stool burden in the colon and rectum. Mild inflammation of the urinary bladder. Bilateral nonobstructing calculi, staghorn in appearance, extending from the collecting systems to the renal pelvises. No hydronephrosis or kidneys. Findings are concerning for urinary bladder cystitis/urinary tract infection. Soft tissue thickening with ulceration at the sacrococcygeal region.  Received: 1L NS bolus, 1g Aztreonam IV

## 2022-11-05 LAB
ALBUMIN SERPL ELPH-MCNC: 2.7 G/DL — LOW (ref 3.3–5)
ALP SERPL-CCNC: 79 U/L — SIGNIFICANT CHANGE UP (ref 40–120)
ALT FLD-CCNC: 6 U/L — LOW (ref 12–78)
ANION GAP SERPL CALC-SCNC: 3 MMOL/L — LOW (ref 5–17)
AST SERPL-CCNC: 12 U/L — LOW (ref 15–37)
BASOPHILS # BLD AUTO: 0.01 K/UL — SIGNIFICANT CHANGE UP (ref 0–0.2)
BASOPHILS NFR BLD AUTO: 0.3 % — SIGNIFICANT CHANGE UP (ref 0–2)
BILIRUB SERPL-MCNC: 0.4 MG/DL — SIGNIFICANT CHANGE UP (ref 0.2–1.2)
BUN SERPL-MCNC: 18 MG/DL — SIGNIFICANT CHANGE UP (ref 7–23)
CALCIUM SERPL-MCNC: 8.9 MG/DL — SIGNIFICANT CHANGE UP (ref 8.5–10.1)
CHLORIDE SERPL-SCNC: 112 MMOL/L — HIGH (ref 96–108)
CO2 SERPL-SCNC: 28 MMOL/L — SIGNIFICANT CHANGE UP (ref 22–31)
CREAT SERPL-MCNC: 0.24 MG/DL — LOW (ref 0.5–1.3)
EGFR: 117 ML/MIN/1.73M2 — SIGNIFICANT CHANGE UP
EOSINOPHIL # BLD AUTO: 0.19 K/UL — SIGNIFICANT CHANGE UP (ref 0–0.5)
EOSINOPHIL NFR BLD AUTO: 4.8 % — SIGNIFICANT CHANGE UP (ref 0–6)
GLUCOSE SERPL-MCNC: 101 MG/DL — HIGH (ref 70–99)
HCT VFR BLD CALC: 29.4 % — LOW (ref 34.5–45)
HGB BLD-MCNC: 9 G/DL — LOW (ref 11.5–15.5)
IMM GRANULOCYTES NFR BLD AUTO: 0 % — SIGNIFICANT CHANGE UP (ref 0–0.9)
LYMPHOCYTES # BLD AUTO: 1.23 K/UL — SIGNIFICANT CHANGE UP (ref 1–3.3)
LYMPHOCYTES # BLD AUTO: 30.8 % — SIGNIFICANT CHANGE UP (ref 13–44)
MCHC RBC-ENTMCNC: 25.9 PG — LOW (ref 27–34)
MCHC RBC-ENTMCNC: 30.6 GM/DL — LOW (ref 32–36)
MCV RBC AUTO: 84.7 FL — SIGNIFICANT CHANGE UP (ref 80–100)
MONOCYTES # BLD AUTO: 0.31 K/UL — SIGNIFICANT CHANGE UP (ref 0–0.9)
MONOCYTES NFR BLD AUTO: 7.8 % — SIGNIFICANT CHANGE UP (ref 2–14)
NEUTROPHILS # BLD AUTO: 2.25 K/UL — SIGNIFICANT CHANGE UP (ref 1.8–7.4)
NEUTROPHILS NFR BLD AUTO: 56.3 % — SIGNIFICANT CHANGE UP (ref 43–77)
NRBC # BLD: 0 /100 WBCS — SIGNIFICANT CHANGE UP (ref 0–0)
PLATELET # BLD AUTO: 286 K/UL — SIGNIFICANT CHANGE UP (ref 150–400)
POTASSIUM SERPL-MCNC: 3.4 MMOL/L — LOW (ref 3.5–5.3)
POTASSIUM SERPL-SCNC: 3.4 MMOL/L — LOW (ref 3.5–5.3)
PROT SERPL-MCNC: 6.8 G/DL — SIGNIFICANT CHANGE UP (ref 6–8.3)
RBC # BLD: 3.47 M/UL — LOW (ref 3.8–5.2)
RBC # FLD: 16 % — HIGH (ref 10.3–14.5)
SODIUM SERPL-SCNC: 143 MMOL/L — SIGNIFICANT CHANGE UP (ref 135–145)
WBC # BLD: 3.99 K/UL — SIGNIFICANT CHANGE UP (ref 3.8–10.5)
WBC # FLD AUTO: 3.99 K/UL — SIGNIFICANT CHANGE UP (ref 3.8–10.5)

## 2022-11-05 PROCEDURE — 93970 EXTREMITY STUDY: CPT | Mod: 26

## 2022-11-05 PROCEDURE — 99233 SBSQ HOSP IP/OBS HIGH 50: CPT

## 2022-11-05 RX ORDER — CARBIDOPA AND LEVODOPA 25; 100 MG/1; MG/1
1 TABLET ORAL
Refills: 0 | Status: DISCONTINUED | OUTPATIENT
Start: 2022-11-05 | End: 2022-11-10

## 2022-11-05 RX ORDER — POTASSIUM CHLORIDE 20 MEQ
10 PACKET (EA) ORAL
Refills: 0 | Status: DISCONTINUED | OUTPATIENT
Start: 2022-11-05 | End: 2022-11-10

## 2022-11-05 RX ORDER — DEXTROSE MONOHYDRATE, SODIUM CHLORIDE, AND POTASSIUM CHLORIDE 50; .745; 4.5 G/1000ML; G/1000ML; G/1000ML
1000 INJECTION, SOLUTION INTRAVENOUS
Refills: 0 | Status: DISCONTINUED | OUTPATIENT
Start: 2022-11-05 | End: 2022-11-06

## 2022-11-05 RX ADMIN — FAMOTIDINE 20 MILLIGRAM(S): 10 INJECTION INTRAVENOUS at 12:01

## 2022-11-05 RX ADMIN — MEROPENEM 100 MILLIGRAM(S): 1 INJECTION INTRAVENOUS at 15:28

## 2022-11-05 RX ADMIN — Medication 1 APPLICATION(S): at 13:53

## 2022-11-05 RX ADMIN — CARBIDOPA AND LEVODOPA 1 TABLET(S): 25; 100 TABLET ORAL at 15:30

## 2022-11-05 RX ADMIN — Medication 75 MILLIGRAM(S): at 05:53

## 2022-11-05 RX ADMIN — MEROPENEM 100 MILLIGRAM(S): 1 INJECTION INTRAVENOUS at 05:53

## 2022-11-05 RX ADMIN — MEROPENEM 100 MILLIGRAM(S): 1 INJECTION INTRAVENOUS at 23:05

## 2022-11-05 RX ADMIN — DEXTROSE MONOHYDRATE, SODIUM CHLORIDE, AND POTASSIUM CHLORIDE 75 MILLILITER(S): 50; .745; 4.5 INJECTION, SOLUTION INTRAVENOUS at 15:29

## 2022-11-05 RX ADMIN — MIRTAZAPINE 15 MILLIGRAM(S): 45 TABLET, ORALLY DISINTEGRATING ORAL at 12:00

## 2022-11-05 RX ADMIN — Medication 2.5 MILLIGRAM(S): at 18:18

## 2022-11-05 RX ADMIN — Medication 100 MILLIEQUIVALENT(S): at 13:55

## 2022-11-05 RX ADMIN — MIDODRINE HYDROCHLORIDE 5 MILLIGRAM(S): 2.5 TABLET ORAL at 18:13

## 2022-11-05 RX ADMIN — Medication 100 MILLIEQUIVALENT(S): at 15:36

## 2022-11-05 RX ADMIN — CARBIDOPA AND LEVODOPA 1 TABLET(S): 25; 100 TABLET ORAL at 13:58

## 2022-11-05 RX ADMIN — ROTIGOTINE 1 PATCH: 8 PATCH, EXTENDED RELEASE TRANSDERMAL at 20:06

## 2022-11-05 RX ADMIN — ROTIGOTINE 1 PATCH: 8 PATCH, EXTENDED RELEASE TRANSDERMAL at 13:50

## 2022-11-05 RX ADMIN — Medication 1 TABLET(S): at 12:03

## 2022-11-05 RX ADMIN — Medication 75 MILLIGRAM(S): at 18:18

## 2022-11-05 RX ADMIN — Medication 2.5 MILLIGRAM(S): at 13:51

## 2022-11-05 RX ADMIN — CARBIDOPA AND LEVODOPA 1 TABLET(S): 25; 100 TABLET ORAL at 08:55

## 2022-11-05 RX ADMIN — ENOXAPARIN SODIUM 40 MILLIGRAM(S): 100 INJECTION SUBCUTANEOUS at 05:53

## 2022-11-05 RX ADMIN — CARBIDOPA AND LEVODOPA 1 TABLET(S): 25; 100 TABLET ORAL at 11:57

## 2022-11-05 RX ADMIN — POLYETHYLENE GLYCOL 3350 17 GRAM(S): 17 POWDER, FOR SOLUTION ORAL at 12:03

## 2022-11-05 RX ADMIN — CARBIDOPA AND LEVODOPA 1 TABLET(S): 25; 100 TABLET ORAL at 22:08

## 2022-11-05 RX ADMIN — CARBIDOPA AND LEVODOPA 1 TABLET(S): 25; 100 TABLET ORAL at 18:11

## 2022-11-05 RX ADMIN — ONDANSETRON 4 MILLIGRAM(S): 8 TABLET, FILM COATED ORAL at 05:53

## 2022-11-05 RX ADMIN — MIDODRINE HYDROCHLORIDE 5 MILLIGRAM(S): 2.5 TABLET ORAL at 11:59

## 2022-11-05 NOTE — PROGRESS NOTE ADULT - SUBJECTIVE AND OBJECTIVE BOX
Pt seen and examined at bedside in ED, isolation. Vital signs stable. A&O to person & place. Very tearful. Denies any pain, nausea or vomiting.     T(C): 36.7 (22 @ 07:28), Max: 37.1 (22 @ 05:47)  HR: 87 (22 @ 07:28) (70 - 87)  BP: 123/66 (22 @ 07:28) (108/66 - 124/74)  RR: 18 (22 @ 07:28) (17 - 18)  SpO2: 96% (22 @ 07:28) (94% - 96%)    PHYSICAL EXAM:  General: no acute distress, appears comfortable  HEENT: normocephalic, anicteric  Pulm: non labored respirations  Cardio: RRR  Abdomen: soft, nontender, nondistended, healed midline incision. PEG tube in place. GJ tube in place connected to carbidopa/levodopa pump  Extremities: no calf tenderness noted    I&O's Detail      LABS:                        9.0    3.99  )-----------( 286      ( 2022 05:12 )             29.4     11-    143  |  112<H>  |  18  ----------------------------<  101<H>  3.4<L>   |  28  |  0.24<L>    Ca    8.9      2022 05:12    TPro  6.8  /  Alb  2.7<L>  /  TBili  0.4  /  DBili  x   /  AST  12<L>  /  ALT  6<L>  /  AlkPhos  79  11-05    PT/INR - ( 2022 15:25 )   PT: 12.9 sec;   INR: 1.10 ratio         PTT - ( 2022 15:25 )  PTT:33.7 sec  Urinalysis Basic - ( 2022 15:25 )    Color: Yellow / Appearance: Turbid / S.015 / pH: x  Gluc: x / Ketone: Trace  / Bili: Negative / Urobili: Negative   Blood: x / Protein: 100 / Nitrite: Positive   Leuk Esterase: Moderate / RBC: 3-5 /HPF / WBC 6-10   Sq Epi: x / Non Sq Epi: Occasional / Bacteria: Many      MEDICATIONS:  acetaminophen     Tablet .. 650 milliGRAM(s) Oral every 6 hours PRN  baclofen 2.5 milliGRAM(s) Oral <User Schedule>  bisacodyl Suppository 10 milliGRAM(s) Rectal daily PRN  carbidopa/levodopa  25/100 1 Tablet(s) Oral <User Schedule>  Dakins Solution - 1/4 Strength 1 Application(s) Topical two times a day  enoxaparin Injectable 40 milliGRAM(s) SubCutaneous every 24 hours  famotidine    Tablet 20 milliGRAM(s) Oral daily  magnesium hydroxide Suspension 30 milliLiter(s) Oral daily PRN  meropenem  IVPB 1000 milliGRAM(s) IV Intermittent every 8 hours  midodrine. 5 milliGRAM(s) Oral <User Schedule>  mirtazapine 15 milliGRAM(s) Oral daily  multivitamin/minerals 1 Tablet(s) Oral daily  ondansetron    Tablet 4 milliGRAM(s) Oral two times a day  oseltamivir 75 milliGRAM(s) Oral two times a day  polyethylene glycol 3350 17 Gram(s) Oral daily  rotigotine patch 4 mG/24 Hr(s) 1 Patch Transdermal every 24 hours  saline laxative (FLEET) Rectal Enema 1 Enema Rectal once PRN     Pt seen and examined at bedside in ED, isolation. Vital signs stable. A&O to person & place. Very tearful. Denies any pain, nausea or vomiting.     T(C): 36.7 (22 @ 07:28), Max: 37.1 (22 @ 05:47)  HR: 87 (22 @ 07:28) (70 - 87)  BP: 123/66 (22 @ 07:28) (108/66 - 124/74)  RR: 18 (22 @ 07:28) (17 - 18)  SpO2: 96% (22 @ 07:28) (94% - 96%)    PHYSICAL EXAM:  General: no acute distress, tearful  HEENT: normocephalic, anicteric  Pulm: non labored respirations  Cardio: RRR  Abdomen: soft, nontender, nondistended, healed midline incision. PEG tube in place. GJ tube in place connected to carbidopa/levodopa pump  Extremities: no calf tenderness noted    I&O's Detail      LABS:                        9.0    3.99  )-----------( 286      ( 2022 05:12 )             29.4     11-    143  |  112<H>  |  18  ----------------------------<  101<H>  3.4<L>   |  28  |  0.24<L>    Ca    8.9      2022 05:12    TPro  6.8  /  Alb  2.7<L>  /  TBili  0.4  /  DBili  x   /  AST  12<L>  /  ALT  6<L>  /  AlkPhos  79  11-05    PT/INR - ( 2022 15:25 )   PT: 12.9 sec;   INR: 1.10 ratio         PTT - ( 2022 15:25 )  PTT:33.7 sec  Urinalysis Basic - ( 2022 15:25 )    Color: Yellow / Appearance: Turbid / S.015 / pH: x  Gluc: x / Ketone: Trace  / Bili: Negative / Urobili: Negative   Blood: x / Protein: 100 / Nitrite: Positive   Leuk Esterase: Moderate / RBC: 3-5 /HPF / WBC 6-10   Sq Epi: x / Non Sq Epi: Occasional / Bacteria: Many      MEDICATIONS:  acetaminophen     Tablet .. 650 milliGRAM(s) Oral every 6 hours PRN  baclofen 2.5 milliGRAM(s) Oral <User Schedule>  bisacodyl Suppository 10 milliGRAM(s) Rectal daily PRN  carbidopa/levodopa  25/100 1 Tablet(s) Oral <User Schedule>  Dakins Solution - 1/4 Strength 1 Application(s) Topical two times a day  enoxaparin Injectable 40 milliGRAM(s) SubCutaneous every 24 hours  famotidine    Tablet 20 milliGRAM(s) Oral daily  magnesium hydroxide Suspension 30 milliLiter(s) Oral daily PRN  meropenem  IVPB 1000 milliGRAM(s) IV Intermittent every 8 hours  midodrine. 5 milliGRAM(s) Oral <User Schedule>  mirtazapine 15 milliGRAM(s) Oral daily  multivitamin/minerals 1 Tablet(s) Oral daily  ondansetron    Tablet 4 milliGRAM(s) Oral two times a day  oseltamivir 75 milliGRAM(s) Oral two times a day  polyethylene glycol 3350 17 Gram(s) Oral daily  rotigotine patch 4 mG/24 Hr(s) 1 Patch Transdermal every 24 hours  saline laxative (FLEET) Rectal Enema 1 Enema Rectal once PRN

## 2022-11-05 NOTE — ED ADULT NURSE REASSESSMENT NOTE - NS ED NURSE REASSESS COMMENT FT1
Received pt from outgoing RN resting in stretcher.  Pt alert and confused.   Pt requesting water at this time.   jimenez draining clear yellow urine.   Pt safety maintained.   Respirations even and unlabored.  Will continue frequent monitoring. BN

## 2022-11-05 NOTE — CONSULT NOTE ADULT - ASSESSMENT
72y Female with PMHx of terminal Parkinson's (bedbound), constipation, depression, arthritis, hx of partial colon resection presenting with clogged PEG G-tube being admitted for disconnected GJ-tube and UTI.    Acute Influenza  Acute Cystitis 2/2 CAUTI  - hx of chronic jimenez, replaced in the Ed  - most recent cx ESBL proteus on recent admission 9/2022 and treated with ertapenem and meropenem  - CT abd/pelvis showed bilateral nonobstructive calculi, staghorn in appearance, extending from the collecting systems to the renal pelvises. No hydronephrosis or kidneys. Findings are concerning for urinary bladder cystitis/urinary tract infection. Soft tissue thickening with ulceration at the sacrococcygeal region.  - UA +nitrite, mod leuk est, pH 9.0, mod blood, many bacteria  - WBC 11.1 and Lactate 2.4 -> 1.7 on admission  - s/p Aztreonam in the ED  Plan:   C/w tamiflu x5 days  C/w meropenem  F/u cx  Trend temps/WBC  Supportive care/supplemental O2 as needed  Maintain aspiration precautions  Infectious Diseases will continue to follow. Please call with any questions.   Deann Zamarripa M.D.  [facility] [phone]

## 2022-11-05 NOTE — PROGRESS NOTE ADULT - SUBJECTIVE AND OBJECTIVE BOX
[facility]  Nahid Shafer PA-C  [address] [place] [address]  [phone]      INTERVAL HPI/OVERNIGHT EVENTS:  Pt s/e, overnight events noted  Pt is minimally verbally responsive and unable to provide much history  G-tube still in place, unclogged  G-J tube connected to pump and in place  CT noted    MEDICATIONS  (STANDING):  baclofen 2.5 milliGRAM(s) Oral <User Schedule>  carbidopa/levodopa  25/100 1 Tablet(s) Oral <User Schedule>  Dakins Solution - 1/4 Strength 1 Application(s) Topical two times a day  enoxaparin Injectable 40 milliGRAM(s) SubCutaneous every 24 hours  famotidine    Tablet 20 milliGRAM(s) Oral daily  meropenem  IVPB 1000 milliGRAM(s) IV Intermittent every 8 hours  midodrine. 5 milliGRAM(s) Oral <User Schedule>  mirtazapine 15 milliGRAM(s) Oral daily  multivitamin/minerals 1 Tablet(s) Oral daily  ondansetron    Tablet 4 milliGRAM(s) Oral two times a day  oseltamivir 75 milliGRAM(s) Oral two times a day  polyethylene glycol 3350 17 Gram(s) Oral daily  rotigotine patch 4 mG/24 Hr(s) 1 Patch Transdermal every 24 hours    MEDICATIONS  (PRN):  acetaminophen     Tablet .. 650 milliGRAM(s) Oral every 6 hours PRN Temp greater or equal to 38C (100.4F), Mild Pain (1 - 3)  bisacodyl Suppository 10 milliGRAM(s) Rectal daily PRN Constipation  magnesium hydroxide Suspension 30 milliLiter(s) Oral daily PRN Constipation  saline laxative (FLEET) Rectal Enema 1 Enema Rectal once PRN Constipation      Allergies    Ceclor (Rash)  IV Contrast (Hypotension)  latex (Rash; Urticaria)      PHYSICAL EXAM:   Vital Signs:  Vital Signs Last 24 Hrs  T(C): 36.7 (2022 07:28), Max: 37.1 (2022 05:47)  T(F): 98.1 (2022 07:28), Max: 98.7 (2022 05:47)  HR: 87 (2022 07:28) (70 - 87)  BP: 123/66 (2022 07:28) (108/66 - 124/74)  BP(mean): --  RR: 18 (2022 07:28) (17 - 18)  SpO2: 96% (2022 07:28) (94% - 96%)    Parameters below as of 2022 07:28  Patient On (Oxygen Delivery Method): room air      Daily Height in cm: 165.1 (2022 14:20)    Daily     GENERAL:  Appears stated age  ABDOMEN:  Soft, non-tender, non-distended, +G-tube, +G-J tube  NEURO:  Alert, minimally verbally responsive      LABS:                        9.0    3.99  )-----------( 286      ( 2022 05:12 )             29.4     11-05    143  |  112<H>  |  18  ----------------------------<  101<H>  3.4<L>   |  28  |  0.24<L>    Ca    8.9      2022 05:12    TPro  6.8  /  Alb  2.7<L>  /  TBili  0.4  /  DBili  x   /  AST  12<L>  /  ALT  6<L>  /  AlkPhos  79  11-05    PT/INR - ( 2022 15:25 )   PT: 12.9 sec;   INR: 1.10 ratio         PTT - ( 2022 15:25 )  PTT:33.7 sec  Urinalysis Basic - ( 2022 15:25 )    Color: Yellow / Appearance: Turbid / S.015 / pH: x  Gluc: x / Ketone: Trace  / Bili: Negative / Urobili: Negative   Blood: x / Protein: 100 / Nitrite: Positive   Leuk Esterase: Moderate / RBC: 3-5 /HPF / WBC 6-10   Sq Epi: x / Non Sq Epi: Occasional / Bacteria: Many        RADIOLOGY & ADDITIONAL TESTS:  < from: CT Abdomen and Pelvis No Cont (22 @ 16:48) >    ACC: [id number] EXAM:  CT ABDOMEN AND PELVIS                        ACC: [id number] EXAM:  CT CHEST                          PROCEDURE DATE:  2022          INTERPRETATION:  CLINICAL INFORMATION: Abdominal tenderness, weakness,   hypoxia    COMPARISON: None.    CONTRAST/COMPLICATIONS:  IV Contrast: None  Oral Contrast: None  Complications: None reported    PROCEDURE:  CT of the Chest, Abdomen and Pelvis was performed.  Sagittal and coronal reformats were performed.    FINDINGS:    CHEST:  LUNGS AND LARGE AIRWAYS: Copious central airway secretions. Patchy left   lower lobe and lingular opacities may represent combination of mucoid   impaction and infection. Mild right basilar atelectasis.  PLEURA: No pleural effusion or pneumothorax.  VESSELS: Normal caliber of the thoracic aorta and main pulmonary artery.   Atheromatous changes.  HEART: Heart size is within normal limits. Coronary artery   calcifications. Aortic valve calcifications. No pericardial effusion.  MEDIASTINUM AND DANELLE: Small volume lymph nodes. Esophagus is   underdistended.  CHEST WALL AND LOWER NECK: No chest wall hematoma. Visualized thyroid is   unremarkable. Bilateral anterior chest wall electronic device is noted.    ABDOMEN AND PELVIS:    Solid organ evaluation is limited due to noncontrast technique.    LIVER: Liver size within normal limits. Subcentimeter hypodensity too   small to characterize.  BILE DUCTS: No biliary distention  GALLBLADDER: Contracted  SPLEEN: Mildly enlarged, 13.7 cm in craniocaudal dimension  PANCREAS: No acute peripancreatic inflammation  ADRENALS: Unremarkable  KIDNEYS/URETERS: No hydronephrosis of the kidneys. Bilateral   nonobstructing calculi, staghorn in appearance, extending from the   collecting systems to the renal pelvises. The mid to distal ureters are   not adequately assessed due to significant streak artifact. Left renal   cyst.    BLADDER: Minimally distended with in situ Diallo catheter. Suboptimally   assessed due to significant streak artifact in the pelvis. However, there   appears to be mild perivesicular stranding.  REPRODUCTIVE ORGANS: Probable myomatous uterus, suboptimally assessed due   to significant streak artifact in the pelvis.    BOWEL: Limited evaluation due to lack of oral contrast. There appear to   be 2 gastrostomy tubes localized to the stomach. The stomach is   underdistended. There is no small bowel distention. However, there is a   long tubular catheter localized to the left mid abdominal small bowel,   without connection to the abdominal wall, possibly a GJ tube that has   disconnected. Appendix is noninflamed in nondistended. Moderate stool   burden of the colon and rectum limits evaluation of the mucosa.  PERITONEUM: No ascites. No free air.  VESSELS: No abdominal aortic aneurysm. Atheromatous changes. IVC filter.  RETROPERITONEUM/LYMPH NODES: Small-volume notes.  ABDOMINAL WALL: Postsurgical changes of the abdominal wall. There appears   to be 2 gastrostomy tubes localized to the stomach. Soft tissue   thickening with ulceration at the sacrococcygeal region. Correlate with   direct visualization for decubitus ulcer.  BONES: Osseous demineralization and degenerative changes. Multilevel   spinal ankylosis. Left hip prosthesis is partially imaged. Old   right-sided rib fracture.    IMPRESSION:    Noncontrast study.    CHEST:  1. Copious central airway secretions. Patchy left lower lobe and lingular   opacities may represent combination of mucoid impaction and   infection/pneumonia. Mild right basilar atelectasis. Follow to resolution   with repeat chest CT in one month, or sooner as clinically warranted.  2. Coronary artery calcifications. Aortic valve calcifications.    ABDOMEN/PELVIS:  1. G tube localized to stomach. Second G tube localized to stomach with   separate catheter localized to the mid left abdomen small bowel, possibly   a GJ tube that has disconnected. Correlate with clinical history. No   small bowel distention. No free air.  2. Moderate stool burden in the colon and rectum. Correlate for   constipation.  3. Mild inflammation of the urinary bladder. Bilateral nonobstructing   calculi, staghorn in appearance, extending from the collecting systems to   the renal pelvises. No hydronephrosis or kidneys. Findings are concerning   for urinary bladder cystitis/urinary tract infection. Correlate with   urinalysis.  4. Soft tissue thickening with ulceration at the sacrococcygeal region.   Correlate with direct visualization for decubitus ulcer.    Dr. Neely discussed these findings with Dr. Burden from ER on 2022   at 5:21 PM, with read back.    --- End of Report ---            MICHELLE NEELY M.D., ATTENDING RADIOLOGIST  This document has been electronically signed. 2022  5:28PM    < end of copied text >

## 2022-11-05 NOTE — PROGRESS NOTE ADULT - ASSESSMENT
72 year old female from [facility] with PMH of Parkinson's, constipation, depression, GERD, arthritis, h/o colon resection for prolapsed rectum and ventral hernia repair with mesh (, Dr. Ferreira), ex lap with ANNI for SBO (2022, Dr. Nuñez), L THR (),  (), R ovarian cystectomy (), bedbound with feeding PEG tube (placed 2022, [facility]), and GJ-tube for carbidopa/levodopa pump (placed  by Dr. Oh), with broken GJ tube.

## 2022-11-05 NOTE — PATIENT PROFILE ADULT - FALL HARM RISK - RISK INTERVENTIONS

## 2022-11-05 NOTE — CONSULT NOTE ADULT - SUBJECTIVE AND OBJECTIVE BOX
[facility], Division of Infectious Diseases  OLLIE Herman S. Shah, HAY [facility]  [phone]    MATEO LESLIE  72y, Female  [id number]    HPI--  HPI:  The patient is a 72y Female with PMHx of terminal Parkinson's (bedbound), constipation, depression, arthritis, hx of partial colon resection presenting with clogged PEG tube. Patient resides at [facility] and was initally sent in for evaluation of clogged G-tube. Patient is unable to provide much information so history retrieved primarily from chart and  at bedside.  was called this morning for clogged G-tube. In the ED, GI was consulted and was able to fix the G-tube without issues. On repeat CT scan - it was shown that pt had a possible disconnected GJ tube. Patient herself is currently not complaining of any pain or discomfort although noted to have a congested cough. Denies any shortness of breath, chest pain, abdominal pain, urinary discomfort, fevers, or chills.  In the ED,  VS: Temp 98.4F, HR 82, Bp 124/74, RR 18, SpO2 94% on 2L NC  Labs: WBC 11.1, Lactate 2.4 -> 1.7, Alb 3.1  UA +nitrite, mod leuk est, pH 9.0, mod blood, many bacteria  RVP +influenza  Imaging:   CT chest showed copious central airway secretions. Patchy left lower lobe and lingular opacities may represent combination of mucoid impaction and infection/pneumonia. Mild right basilar atelectasis. Coronary artery calcifications. Aortic valve calcifications.  CT abd/pelvis showed G tube localized to stomach. Second G tube localized to stomach with separate catheter localized to the mid left abdomen small bowel, possibly a GJ tube that has disconnected. Moderate stool burden in the colon and rectum. Mild inflammation of the urinary bladder. Bilateral nonobstructing calculi, staghorn in appearance, extending from the collecting systems to the renal pelvises. No hydronephrosis or kidneys. Findings are concerning for urinary bladder cystitis/urinary tract infection. Soft tissue thickening with ulceration at the sacrococcygeal region.  Received: 1L NS bolus, 1g Aztreonam IV (2022 18:50)    Pt seen in the ED   at bedside  Hx as above    Active Medications--  acetaminophen     Tablet .. 650 milliGRAM(s) Oral every 6 hours PRN  baclofen 2.5 milliGRAM(s) Oral <User Schedule>  bisacodyl Suppository 10 milliGRAM(s) Rectal daily PRN  carbidopa/levodopa  25/100 1 Tablet(s) Oral <User Schedule>  Dakins Solution - 1/4 Strength 1 Application(s) Topical two times a day  enoxaparin Injectable 40 milliGRAM(s) SubCutaneous every 24 hours  famotidine    Tablet 20 milliGRAM(s) Oral daily  magnesium hydroxide Suspension 30 milliLiter(s) Oral daily PRN  meropenem  IVPB 1000 milliGRAM(s) IV Intermittent every 8 hours  midodrine. 5 milliGRAM(s) Oral <User Schedule>  mirtazapine 15 milliGRAM(s) Oral daily  multivitamin/minerals 1 Tablet(s) Oral daily  ondansetron    Tablet 4 milliGRAM(s) Oral two times a day  oseltamivir 75 milliGRAM(s) Oral two times a day  polyethylene glycol 3350 17 Gram(s) Oral daily  potassium chloride  10 mEq/100 mL IVPB 10 milliEquivalent(s) IV Intermittent every 1 hour  rotigotine patch 4 mG/24 Hr(s) 1 Patch Transdermal every 24 hours  saline laxative (FLEET) Rectal Enema 1 Enema Rectal once PRN  sodium chloride 0.45% with potassium chloride 20 mEq/L 1000 milliLiter(s) IV Continuous <Continuous>    Antimicrobials:   meropenem  IVPB 1000 milliGRAM(s) IV Intermittent every 8 hours  oseltamivir 75 milliGRAM(s) Oral two times a day    Immunologic:     ROS:  unable to obtain    Allergies: Ceclor (Rash)  IV Contrast (Hypotension)  latex (Rash; Urticaria)    PMH -- Constipation    Unilateral primary osteoarthritis, left hip    Parkinson&#x27;s disease    Basal cell carcinoma    GERD (gastroesophageal reflux disease)    Seasonal allergies    Depression    Acute respiratory failure with hypoxia      PSH -- S/P deep brain stimulator placement    History of     H/O ovarian cystectomy    H/O colonoscopy    Basal cell carcinoma    Abdominal hernia      FH -- No pertinent family history in first degree relatives      Social History --  EtOH: denies   Tobacco: denies   Drug Use: denies     Travel/Environmental/Occupational History:    Physical Exam--  Vital Signs Last 24 Hrs  T(F): 98.1 (2022 07:28), Max: 98.7 (2022 05:47)  HR: 87 (2022 07:28) (70 - 87)  BP: 123/66 (2022 07:28) (108/66 - 124/74)  RR: 18 (2022 07:28) (17 - 18)  SpO2: 96% (2022 07:28) (94% - 96%)  General: nontoxic-appearing, no acute distress  HEENT: NC/AT, EOMI, anicteric  Lungs: Clear bilaterally without rales, wheezing or rhonchi  Heart: Regular rate and rhythm. No murmur, rub or gallop.  Abdomen: Soft. Nondistended. Nontender.  Extremities: No cyanosis or clubbing. No edema.   Skin: Warm. Dry.     Laboratory & Imaging Data:  CBC:                       9.0    3.99  )-----------( 286      ( 2022 05:12 )             29.4     CMP:     143  |  112<H>  |  18  ----------------------------<  101<H>  3.4<L>   |  28  |  0.24<L>    Ca    8.9      2022 05:12    TPro  6.8  /  Alb  2.7<L>  /  TBili  0.4  /  DBili  x   /  AST  12<L>  /  ALT  6<L>  /  AlkPhos  79  11-05    LIVER FUNCTIONS - ( 2022 05:12 )  Alb: 2.7 g/dL / Pro: 6.8 g/dL / ALK PHOS: 79 U/L / ALT: 6 U/L / AST: 12 U/L / GGT: x           Urinalysis Basic - ( 2022 15:25 )    Color: Yellow / Appearance: Turbid / S.015 / pH: x  Gluc: x / Ketone: Trace  / Bili: Negative / Urobili: Negative   Blood: x / Protein: 100 / Nitrite: Positive   Leuk Esterase: Moderate / RBC: 3-5 /HPF / WBC 6-10   Sq Epi: x / Non Sq Epi: Occasional / Bacteria: Many        Microbiology: reviewed        Radiology: reviewed    < from: US Duplex Venous Lower Ext Complete, Bilateral (11.05.22 @ 11:06) >    ACC: [id number] EXAM:  US DPLX LWR EXT VEINS COMPL BI                          PROCEDURE DATE:  2022          INTERPRETATION:  CLINICAL INFORMATION: Bilateral calf pain    COMPARISON: Venous Doppler 2022    TECHNIQUE: Duplex sonography of the BILATERAL LOWER extremity veins with   color and spectral Doppler, with and without compression.    FINDINGS:    RIGHT:  Normal compressibility of the RIGHT common femoral, femoral and popliteal   veins.  Doppler examination shows normal spontaneous and phasic flow.  No RIGHT calf vein thrombosis is detected.    LEFT:  Partial compressibility of the left common femoral vein with eccentric   wall thickening.  Normal compressibility of the LEFT femoral and popliteal veins.  Doppler examination shows normal spontaneous and phasic flow.  No LEFT calf vein thrombosis is detected.    IMPRESSION:  No evidence of acute deep venous thrombosis in either lower extremity.  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    --- End of Report ---            SHIRA CONLEY MD; Attending Radiologist  This document has been electronically signed. 2022 12:15PM    < end of copied text >  < from: CT Abdomen and Pelvis No Cont (22 @ 16:48) >    ACC: [id number] EXAM:  CT ABDOMEN AND PELVIS                        ACC: [id number] EXAM:  CT CHEST                          PROCEDURE DATE:  2022          INTERPRETATION:  CLINICAL INFORMATION: Abdominal tenderness, weakness,   hypoxia    COMPARISON: None.    CONTRAST/COMPLICATIONS:  IV Contrast: None  Oral Contrast: None  Complications: None reported    PROCEDURE:  CT of the Chest, Abdomen and Pelvis was performed.  Sagittal and coronal reformats were performed.    FINDINGS:    CHEST:  LUNGS AND LARGE AIRWAYS: Copious central airway secretions. Patchy left   lower lobe and lingular opacities may represent combination of mucoid   impaction and infection. Mild right basilar atelectasis.  PLEURA: No pleural effusion or pneumothorax.  VESSELS: Normal caliber of the thoracic aorta and main pulmonary artery.   Atheromatous changes.  HEART: Heart size is within normal limits. Coronary artery   calcifications. Aortic valve calcifications. No pericardial effusion.  MEDIASTINUM AND DANELLE: Small volume lymph nodes. Esophagus is   underdistended.  CHEST WALL AND LOWER NECK: No chest wall hematoma. Visualized thyroid is   unremarkable. Bilateral anterior chest wall electronic device is noted.    ABDOMEN AND PELVIS:    Solid organ evaluation is limited due to noncontrast technique.    LIVER: Liver size within normal limits. Subcentimeter hypodensity too   small to characterize.  BILE DUCTS: No biliary distention  GALLBLADDER: Contracted  SPLEEN: Mildly enlarged, 13.7 cm in craniocaudal dimension  PANCREAS: No acute peripancreatic inflammation  ADRENALS: Unremarkable  KIDNEYS/URETERS: No hydronephrosis of the kidneys. Bilateral   nonobstructing calculi, staghorn in appearance, extending from the   collecting systems to the renal pelvises. The mid to distal ureters are   not adequately assessed due to significant streak artifact. Left renal   cyst.    BLADDER: Minimally distended with in situ Diallo catheter. Suboptimally   assessed due to significant streak artifact in the pelvis. However, there   appears to be mild perivesicular stranding.  REPRODUCTIVE ORGANS: Probable myomatous uterus, suboptimally assessed due   to significant streak artifact in the pelvis.    BOWEL: Limited evaluation due to lack of oral contrast. There appear to   be 2 gastrostomy tubes localized to the stomach. The stomach is   underdistended. There is no small bowel distention. However, there is a   long tubular catheter localized to the left mid abdominal small bowel,   without connection to the abdominal wall, possibly a GJ tube that has   disconnected. Appendix is noninflamed in nondistended. Moderate stool   burden of the colon and rectum limits evaluation of the mucosa.  PERITONEUM: No ascites. No free air.  VESSELS: No abdominal aortic aneurysm. Atheromatous changes. IVC filter.  RETROPERITONEUM/LYMPH NODES: Small-volume notes.  ABDOMINAL WALL: Postsurgical changes of the abdominal wall. There appears   to be 2 gastrostomy tubes localized to the stomach. Soft tissue   thickening with ulceration at the sacrococcygeal region. Correlate with   direct visualization for decubitus ulcer.  BONES: Osseous demineralization and degenerative changes. Multilevel   spinal ankylosis. Left hip prosthesis is partially imaged. Old   right-sided rib fracture.    IMPRESSION:    Noncontrast study.    CHEST:  1. Copious central airway secretions. Patchy left lower lobe and lingular   opacities may represent combination of mucoid impaction and   infection/pneumonia. Mild right basilar atelectasis. Follow to resolution   with repeat chest CT in one month, or sooner as clinically warranted.  2. Coronary artery calcifications. Aortic valve calcifications.    ABDOMEN/PELVIS:  1. G tube localized to stomach. Second G tube localized to stomach with   separate catheter localized to the mid left abdomen small bowel, possibly   a GJ tube that has disconnected. Correlate with clinical history. No   small bowel distention. No free air.  2. Moderate stool burden in the colon and rectum. Correlate for   constipation.  3. Mild inflammation of the urinary bladder. Bilateral nonobstructing   calculi, staghorn in appearance, extending from the collecting systems to   the renal pelvises. No hydronephrosis or kidneys. Findings are concerning   for urinary bladder cystitis/urinary tract infection. Correlate with   urinalysis.  4. Soft tissue thickening with ulceration at the sacrococcygeal region.   Correlate with direct visualization for decubitus ulcer.    Dr. Mahan discussed these findings with Dr. Burden from ER on 2022   at 5:21 PM, with read back.    --- End of Report ---            MICHELLE MAHAN M.D., ATTENDING RADIOLOGIST  This document has been electronically signed. 2022  5:28PM    < end of copied text >

## 2022-11-05 NOTE — PROGRESS NOTE ADULT - PROBLEM SELECTOR PLAN 1
- No emergent surgical intervention at this time as patient without evidence of obstruction.  - Conservative management; monitor for passage of dislodged fragment of GJ tube.  - To be discussed with Dr. Saeed

## 2022-11-05 NOTE — PATIENT PROFILE ADULT - NSPROIMPLANTSMEDDEV_GEN_A_NUR
Bilateral brain stimulater  Duopa- internal implant- stomach- there medication box on the outside./Feeding tube/Gastrostomy

## 2022-11-05 NOTE — PROGRESS NOTE ADULT - SUBJECTIVE AND OBJECTIVE BOX
CHIEF COMPLAINT/INTERVAL HISTORY:  Pt. seen and evaluated for GJ tube dislodgement and influenza infection.  Pt. is tearful.  Denies having any N/V or abdominal pain.  Denies feeling SOB.  Tolerating Tamiflu and IV antibiotics.     REVIEW OF SYSTEMS:  No fever, CP, SOB, or abdominal pain    Vital Signs Last 24 Hrs  T(C): 36.7 (2022 07:28), Max: 37.1 (2022 05:47)  T(F): 98.1 (2022 07:28), Max: 98.7 (2022 05:47)  HR: 87 (2022 07:28) (70 - 87)  BP: 123/66 (2022 07:28) (108/66 - 124/74)  BP(mean): --  RR: 18 (2022 07:28) (17 - 18)  SpO2: 96% (2022 07:28) (94% - 96%)    Parameters below as of 2022 07:28  Patient On (Oxygen Delivery Method): room air        PHYSICAL EXAM:  GENERAL: NAD  HEENT: EOMI, hearing normal, conjunctiva and sclera clear  Chest: diminished BS bilaterally, no wheezing  CV: S1S2, RRR,   GI: soft, +BS, NT/ND, +Gtube, +GJ tube, rash noted on lower abdomen  Musculoskeletal: no edema, contracted LE  Psychiatric: affect nL, mood nL  Skin: warm and dry    LABS:                        9.0    3.99  )-----------( 286      ( 2022 05:12 )             29.4     11-    143  |  112<H>  |  18  ----------------------------<  101<H>  3.4<L>   |  28  |  0.24<L>    Ca    8.9      2022 05:12    TPro  6.8  /  Alb  2.7<L>  /  TBili  0.4  /  DBili  x   /  AST  12<L>  /  ALT  6<L>  /  AlkPhos  79  11-05    PT/INR - ( 2022 15:25 )   PT: 12.9 sec;   INR: 1.10 ratio         PTT - ( 2022 15:25 )  PTT:33.7 sec  Urinalysis Basic - ( 2022 15:25 )    Color: Yellow / Appearance: Turbid / S.015 / pH: x  Gluc: x / Ketone: Trace  / Bili: Negative / Urobili: Negative   Blood: x / Protein: 100 / Nitrite: Positive   Leuk Esterase: Moderate / RBC: 3-5 /HPF / WBC 6-10   Sq Epi: x / Non Sq Epi: Occasional / Bacteria: Many

## 2022-11-05 NOTE — PROGRESS NOTE ADULT - ASSESSMENT
72y Female with PMHx of terminal Parkinson's (bedbound), constipation, depression, arthritis, hx of partial colon resection presenting with clogged PEG G-tube being admitted for disconnected GJ-tube and UTI.     Problem/Plan - 1:  ·  Problem: Encounter for gastrojejunal tube placement.   ·  Plan: Pt w/ both chronic G-tube and GJ-tube  - Presented initially with clogged G-tube which was resolved in ED, however CT noted disconnected GJ tube  - CT abd/pelvis showed G tube localized to stomach. Second G tube localized to stomach with separate catheter localized to the mid left abdomen small bowel, possibly a GJ tube that has disconnected.  - NPO except meds for now pending recs from GI and surgery.  Start 1/2NS +KCl 20meq@75cc/hr x 12 hours  - Resume tube feeds when possible with Jevity 1.5mg 60cc/hr x14hrs (from 16:00 to 6:00) with 25mL free water flush every hour (from 16:00 to 6:00) and 200mL water bolus flush q8h  - NOTE: please use PEG tube for all meds and tube feeds. GJ tube is ONLY used for duopa - and currently NOT usable until further evaluation and workup.  - GI Dr. Stringer consulted and following  - Surgery Dr. Saeed consulted, f/u recs.     Problem/Plan - 2:  ·  Problem: Acute UTI.   ·  Plan: Pt w/ chronic jimenez, replaced in the ED  - Pt recently found to have ESBL proteus on recent admission 9/2022 and treated with ertapenem and meropenem  - CT abd/pelvis showed bilateral nonobstructive calculi, staghorn in appearance, extending from the collecting systems to the renal pelvises. No hydronephrosis or kidneys. Findings are concerning for urinary bladder cystitis/urinary tract infection. Soft tissue thickening with ulceration at the sacrococcygeal region.  - UA +nitrite, mod leuk est, pH 9.0, mod blood, many bacteria  - WBC 11.1 and Lactate 2.4 -> 1.7 on admission  - Given 1L NS bolus and 1g Aztreonam in the ED  - Start on meropenem 1g q8h  - Tylenol 650mg PRN for fever and pain  - F/u UCx, BCx x2  - Trend WBC and monitor for fever  - ID Dr. Johns consulted, f/u recs.     Problem/Plan - 3:  ·  Problem: Influenza.   ·  Plan: Pt w/ congestive cough and noted diffuse rhonchi on physical exam, denies SOB  - RVP +influenza  - CT chest showed copious central airway secretions. Patchy left lower lobe and lingular opacities may represent combination of mucoid impaction and infection/pneumonia. Mild right basilar atelectasis. Coronary artery calcifications. Aortic valve calcifications.  - Start Tamiflu for 5 days  - Currently satting well on 2L O2 nasal cannula  - Supplemental O2 PRN, wean and titrated as needed  - Monitor respiratory status  - Continuous pulse ox  - ID Dr. Johns consulted, f/u recs.     Problem/Plan - 4:  ·  Problem: Parkinson's disease.   ·  Plan: Terminal Parkinson's disease  - Pt is bedbound and unable to move extremities  - Continue home neupro patch  - Continue home baclofen for spasticity  - HOLD duopa for now - because this medication is administered through GJ tube which is currently not functioning - spouse has brought it from home - will need to call pharmacy to free-text enter it once GJ tube is usable again.  Started on Sinemet via PEG Q2h while awake  - Aspiration precautions  - Fall risk precautions.     Problem/Plan - 5:  ·  Problem: Constipation.   ·  Plan: Chronic constipation on many home laxatives and stool softeners  - CT abd/pelvis showed moderate stool burden in the colon and rectum.   - Miralax standing  - MOM PRN  - Bisacodyl suppository PRN  - Fleet enema PRN.     Problem/Plan - 6:  ·  Problem: Depression.   ·  Plan: Chronic  - Continue home mirtazapine.     Problem/Plan - 7:  ·  Problem: Need for prophylactic measure.   ·  Plan: DVT ppx w/ lovenox 40mg qd.

## 2022-11-05 NOTE — PROGRESS NOTE ADULT - ASSESSMENT
G-tube clogged  G-J tube malfunction    G-tube was unclogged at bedside yesterday  Catheter from GJ tube found to be disconnected on CT  Surgical evaluation noted  No current obstruction  Monitor for passage  Will follow    I reviewed the overnight course of events on the unit, re-confirming the patient history. I discussed the care with the patient and their family  Differential diagnosis and plan of care discussed with patient after the evaluation  35 minutes spent on total encounter of which more than fifty percent of the encounter was spent counseling and/or coordinating care by the attending physician.  Advanced care planning was discussed with patient and family.  Advanced care planning forms were reviewed and discussed.  Risks, benefits and alternatives of gastroenterologic procedures were discussed in detail and all questions were answered.

## 2022-11-05 NOTE — PATIENT PROFILE ADULT - FALL HARM RISK - CONCLUSION
Patient was identified by name and D.O.B Allergies were reviewed with patient. Vitamin B12 injection was given in the Right deltoid.    1000 mcg Vitamin B12  NDC#12785-163-01  Lot#[id number]  Exp-04/28/2022  
Fall Risk

## 2022-11-06 LAB
ANION GAP SERPL CALC-SCNC: 8 MMOL/L — SIGNIFICANT CHANGE UP (ref 5–17)
BASOPHILS # BLD AUTO: 0.01 K/UL — SIGNIFICANT CHANGE UP (ref 0–0.2)
BASOPHILS NFR BLD AUTO: 0.2 % — SIGNIFICANT CHANGE UP (ref 0–2)
BUN SERPL-MCNC: 16 MG/DL — SIGNIFICANT CHANGE UP (ref 7–23)
CALCIUM SERPL-MCNC: 8.6 MG/DL — SIGNIFICANT CHANGE UP (ref 8.5–10.1)
CHLORIDE SERPL-SCNC: 112 MMOL/L — HIGH (ref 96–108)
CO2 SERPL-SCNC: 24 MMOL/L — SIGNIFICANT CHANGE UP (ref 22–31)
CREAT SERPL-MCNC: <0.2 MG/DL — LOW (ref 0.5–1.3)
EGFR: 124 ML/MIN/1.73M2 — SIGNIFICANT CHANGE UP
EOSINOPHIL # BLD AUTO: 0.15 K/UL — SIGNIFICANT CHANGE UP (ref 0–0.5)
EOSINOPHIL NFR BLD AUTO: 3.6 % — SIGNIFICANT CHANGE UP (ref 0–6)
GLUCOSE SERPL-MCNC: 79 MG/DL — SIGNIFICANT CHANGE UP (ref 70–99)
HCT VFR BLD CALC: 27.3 % — LOW (ref 34.5–45)
HGB BLD-MCNC: 8.4 G/DL — LOW (ref 11.5–15.5)
IMM GRANULOCYTES NFR BLD AUTO: 0.5 % — SIGNIFICANT CHANGE UP (ref 0–0.9)
LYMPHOCYTES # BLD AUTO: 1.08 K/UL — SIGNIFICANT CHANGE UP (ref 1–3.3)
LYMPHOCYTES # BLD AUTO: 25.7 % — SIGNIFICANT CHANGE UP (ref 13–44)
MAGNESIUM SERPL-MCNC: 2 MG/DL — SIGNIFICANT CHANGE UP (ref 1.6–2.6)
MCHC RBC-ENTMCNC: 26.3 PG — LOW (ref 27–34)
MCHC RBC-ENTMCNC: 30.8 GM/DL — LOW (ref 32–36)
MCV RBC AUTO: 85.3 FL — SIGNIFICANT CHANGE UP (ref 80–100)
MONOCYTES # BLD AUTO: 0.22 K/UL — SIGNIFICANT CHANGE UP (ref 0–0.9)
MONOCYTES NFR BLD AUTO: 5.2 % — SIGNIFICANT CHANGE UP (ref 2–14)
NEUTROPHILS # BLD AUTO: 2.72 K/UL — SIGNIFICANT CHANGE UP (ref 1.8–7.4)
NEUTROPHILS NFR BLD AUTO: 64.8 % — SIGNIFICANT CHANGE UP (ref 43–77)
NRBC # BLD: 0 /100 WBCS — SIGNIFICANT CHANGE UP (ref 0–0)
PLATELET # BLD AUTO: 269 K/UL — SIGNIFICANT CHANGE UP (ref 150–400)
POTASSIUM SERPL-MCNC: 3.8 MMOL/L — SIGNIFICANT CHANGE UP (ref 3.5–5.3)
POTASSIUM SERPL-SCNC: 3.8 MMOL/L — SIGNIFICANT CHANGE UP (ref 3.5–5.3)
RBC # BLD: 3.2 M/UL — LOW (ref 3.8–5.2)
RBC # FLD: 16.2 % — HIGH (ref 10.3–14.5)
SODIUM SERPL-SCNC: 144 MMOL/L — SIGNIFICANT CHANGE UP (ref 135–145)
WBC # BLD: 4.2 K/UL — SIGNIFICANT CHANGE UP (ref 3.8–10.5)
WBC # FLD AUTO: 4.2 K/UL — SIGNIFICANT CHANGE UP (ref 3.8–10.5)

## 2022-11-06 PROCEDURE — 99233 SBSQ HOSP IP/OBS HIGH 50: CPT

## 2022-11-06 RX ORDER — DEXTROSE MONOHYDRATE, SODIUM CHLORIDE, AND POTASSIUM CHLORIDE 50; .745; 4.5 G/1000ML; G/1000ML; G/1000ML
1000 INJECTION, SOLUTION INTRAVENOUS
Refills: 0 | Status: DISCONTINUED | OUTPATIENT
Start: 2022-11-06 | End: 2022-11-10

## 2022-11-06 RX ORDER — INFLUENZA VIRUS VACCINE 15; 15; 15; 15 UG/.5ML; UG/.5ML; UG/.5ML; UG/.5ML
0.7 SUSPENSION INTRAMUSCULAR ONCE
Refills: 0 | Status: DISCONTINUED | OUTPATIENT
Start: 2022-11-06 | End: 2022-11-10

## 2022-11-06 RX ADMIN — CARBIDOPA AND LEVODOPA 1 TABLET(S): 25; 100 TABLET ORAL at 05:52

## 2022-11-06 RX ADMIN — Medication 75 MILLIGRAM(S): at 05:52

## 2022-11-06 RX ADMIN — CARBIDOPA AND LEVODOPA 1 TABLET(S): 25; 100 TABLET ORAL at 14:12

## 2022-11-06 RX ADMIN — Medication 1 APPLICATION(S): at 17:18

## 2022-11-06 RX ADMIN — DEXTROSE MONOHYDRATE, SODIUM CHLORIDE, AND POTASSIUM CHLORIDE 75 MILLILITER(S): 50; .745; 4.5 INJECTION, SOLUTION INTRAVENOUS at 11:12

## 2022-11-06 RX ADMIN — Medication 2.5 MILLIGRAM(S): at 08:20

## 2022-11-06 RX ADMIN — FAMOTIDINE 20 MILLIGRAM(S): 10 INJECTION INTRAVENOUS at 11:12

## 2022-11-06 RX ADMIN — Medication 75 MILLIGRAM(S): at 17:17

## 2022-11-06 RX ADMIN — MEROPENEM 100 MILLIGRAM(S): 1 INJECTION INTRAVENOUS at 05:52

## 2022-11-06 RX ADMIN — ROTIGOTINE 1 PATCH: 8 PATCH, EXTENDED RELEASE TRANSDERMAL at 19:48

## 2022-11-06 RX ADMIN — Medication 1 APPLICATION(S): at 05:53

## 2022-11-06 RX ADMIN — CARBIDOPA AND LEVODOPA 1 TABLET(S): 25; 100 TABLET ORAL at 19:39

## 2022-11-06 RX ADMIN — MEROPENEM 100 MILLIGRAM(S): 1 INJECTION INTRAVENOUS at 22:00

## 2022-11-06 RX ADMIN — Medication 1 TABLET(S): at 11:12

## 2022-11-06 RX ADMIN — CARBIDOPA AND LEVODOPA 1 TABLET(S): 25; 100 TABLET ORAL at 13:43

## 2022-11-06 RX ADMIN — CARBIDOPA AND LEVODOPA 1 TABLET(S): 25; 100 TABLET ORAL at 17:18

## 2022-11-06 RX ADMIN — POLYETHYLENE GLYCOL 3350 17 GRAM(S): 17 POWDER, FOR SOLUTION ORAL at 11:18

## 2022-11-06 RX ADMIN — ROTIGOTINE 1 PATCH: 8 PATCH, EXTENDED RELEASE TRANSDERMAL at 07:38

## 2022-11-06 RX ADMIN — Medication 2.5 MILLIGRAM(S): at 15:25

## 2022-11-06 RX ADMIN — ONDANSETRON 4 MILLIGRAM(S): 8 TABLET, FILM COATED ORAL at 05:52

## 2022-11-06 RX ADMIN — CARBIDOPA AND LEVODOPA 1 TABLET(S): 25; 100 TABLET ORAL at 16:43

## 2022-11-06 RX ADMIN — ROTIGOTINE 1 PATCH: 8 PATCH, EXTENDED RELEASE TRANSDERMAL at 14:12

## 2022-11-06 RX ADMIN — ROTIGOTINE 1 PATCH: 8 PATCH, EXTENDED RELEASE TRANSDERMAL at 13:46

## 2022-11-06 RX ADMIN — ONDANSETRON 4 MILLIGRAM(S): 8 TABLET, FILM COATED ORAL at 17:18

## 2022-11-06 RX ADMIN — CARBIDOPA AND LEVODOPA 1 TABLET(S): 25; 100 TABLET ORAL at 08:21

## 2022-11-06 RX ADMIN — MIRTAZAPINE 15 MILLIGRAM(S): 45 TABLET, ORALLY DISINTEGRATING ORAL at 11:12

## 2022-11-06 RX ADMIN — CARBIDOPA AND LEVODOPA 1 TABLET(S): 25; 100 TABLET ORAL at 22:00

## 2022-11-06 RX ADMIN — MIDODRINE HYDROCHLORIDE 5 MILLIGRAM(S): 2.5 TABLET ORAL at 17:18

## 2022-11-06 RX ADMIN — CARBIDOPA AND LEVODOPA 1 TABLET(S): 25; 100 TABLET ORAL at 00:33

## 2022-11-06 RX ADMIN — CARBIDOPA AND LEVODOPA 1 TABLET(S): 25; 100 TABLET ORAL at 10:27

## 2022-11-06 RX ADMIN — MIDODRINE HYDROCHLORIDE 5 MILLIGRAM(S): 2.5 TABLET ORAL at 08:21

## 2022-11-06 RX ADMIN — MEROPENEM 100 MILLIGRAM(S): 1 INJECTION INTRAVENOUS at 14:12

## 2022-11-06 RX ADMIN — ENOXAPARIN SODIUM 40 MILLIGRAM(S): 100 INJECTION SUBCUTANEOUS at 05:52

## 2022-11-06 NOTE — PROGRESS NOTE ADULT - SUBJECTIVE AND OBJECTIVE BOX
SUBJECTIVE:  Patient seen and examined at bedside.  No overnight events. Per nurse aide pt with BM overnight.    Vital Signs Last 24 Hrs  T(C): 36.3 (2022 21:38), Max: 37.1 (2022 05:47)  T(F): 97.3 (2022 21:38), Max: 98.7 (2022 05:47)  HR: 82 (2022 21:38) (79 - 87)  BP: 100/62 (2022 21:38) (100/62 - 123/66)  BP(mean): --  RR: 20 (2022 21:38) (17 - 20)  SpO2: 90% (:38) (90% - 96%)    Parameters below as of 2022 21:38  Patient On (Oxygen Delivery Method): nasal cannula  O2 Flow (L/min): 2      PHYSICAL EXAM:  GENERAL: No acute distress, well-developed  HEAD:  Atraumatic, Normocephalic  ABDOMEN: Soft, non-tender, non-distended    I&O's Summary    I&O's Detail    MEDICATIONS  (STANDING):  baclofen 2.5 milliGRAM(s) Oral <User Schedule>  carbidopa/levodopa  25/100 1 Tablet(s) Oral <User Schedule>  Dakins Solution - 1/4 Strength 1 Application(s) Topical two times a day  enoxaparin Injectable 40 milliGRAM(s) SubCutaneous every 24 hours  famotidine    Tablet 20 milliGRAM(s) Oral daily  influenza  Vaccine (HIGH DOSE) 0.7 milliLiter(s) IntraMuscular once  meropenem  IVPB 1000 milliGRAM(s) IV Intermittent every 8 hours  midodrine. 5 milliGRAM(s) Oral <User Schedule>  mirtazapine 15 milliGRAM(s) Oral daily  multivitamin/minerals 1 Tablet(s) Oral daily  ondansetron    Tablet 4 milliGRAM(s) Oral two times a day  oseltamivir 75 milliGRAM(s) Oral two times a day  polyethylene glycol 3350 17 Gram(s) Oral daily  potassium chloride  10 mEq/100 mL IVPB 10 milliEquivalent(s) IV Intermittent every 1 hour  rotigotine patch 4 mG/24 Hr(s) 1 Patch Transdermal every 24 hours  sodium chloride 0.45% with potassium chloride 20 mEq/L 1000 milliLiter(s) (75 mL/Hr) IV Continuous <Continuous>    MEDICATIONS  (PRN):  acetaminophen     Tablet .. 650 milliGRAM(s) Oral every 6 hours PRN Temp greater or equal to 38C (100.4F), Mild Pain (1 - 3)  bisacodyl Suppository 10 milliGRAM(s) Rectal daily PRN Constipation  magnesium hydroxide Suspension 30 milliLiter(s) Oral daily PRN Constipation  saline laxative (FLEET) Rectal Enema 1 Enema Rectal once PRN Constipation    LABS:                        9.0    3.99  )-----------( 286      ( 2022 05:12 )             29.4     11-05    143  |  112<H>  |  18  ----------------------------<  101<H>  3.4<L>   |  28  |  0.24<L>    Ca    8.9      2022 05:12    TPro  6.8  /  Alb  2.7<L>  /  TBili  0.4  /  DBili  x   /  AST  12<L>  /  ALT  6<L>  /  AlkPhos  79  11-05    PT/INR - ( 2022 15:25 )   PT: 12.9 sec;   INR: 1.10 ratio         PTT - ( 2022 15:25 )  PTT:33.7 sec  Urinalysis Basic - ( 2022 15:25 )    Color: Yellow / Appearance: Turbid / S.015 / pH: x  Gluc: x / Ketone: Trace  / Bili: Negative / Urobili: Negative   Blood: x / Protein: 100 / Nitrite: Positive   Leuk Esterase: Moderate / RBC: 3-5 /HPF / WBC 6-10   Sq Epi: x / Non Sq Epi: Occasional / Bacteria: Many      RADIOLOGY & ADDITIONAL STUDIES:

## 2022-11-06 NOTE — PROGRESS NOTE ADULT - SUBJECTIVE AND OBJECTIVE BOX
[facility]  Nahid Shafer PA-C  [address] [place] [address]  [phone]      INTERVAL HPI/OVERNIGHT EVENTS:  Pt s/e, overnight events noted  Pt is minimally verbally responsive and unable to provide much history  G-tube still in place, unclogged  G-J tube connected to pump and in place  CT noted  +reported BM    MEDICATIONS  (STANDING):  baclofen 2.5 milliGRAM(s) Oral <User Schedule>  carbidopa/levodopa  25/100 1 Tablet(s) Oral <User Schedule>  Dakins Solution - 1/4 Strength 1 Application(s) Topical two times a day  enoxaparin Injectable 40 milliGRAM(s) SubCutaneous every 24 hours  famotidine    Tablet 20 milliGRAM(s) Oral daily  meropenem  IVPB 1000 milliGRAM(s) IV Intermittent every 8 hours  midodrine. 5 milliGRAM(s) Oral <User Schedule>  mirtazapine 15 milliGRAM(s) Oral daily  multivitamin/minerals 1 Tablet(s) Oral daily  ondansetron    Tablet 4 milliGRAM(s) Oral two times a day  oseltamivir 75 milliGRAM(s) Oral two times a day  polyethylene glycol 3350 17 Gram(s) Oral daily  rotigotine patch 4 mG/24 Hr(s) 1 Patch Transdermal every 24 hours    MEDICATIONS  (PRN):  acetaminophen     Tablet .. 650 milliGRAM(s) Oral every 6 hours PRN Temp greater or equal to 38C (100.4F), Mild Pain (1 - 3)  bisacodyl Suppository 10 milliGRAM(s) Rectal daily PRN Constipation  magnesium hydroxide Suspension 30 milliLiter(s) Oral daily PRN Constipation  saline laxative (FLEET) Rectal Enema 1 Enema Rectal once PRN Constipation      Allergies    Ceclor (Rash)  IV Contrast (Hypotension)  latex (Rash; Urticaria)      PHYSICAL EXAM:   Vital Signs Last 24 Hrs  T(C): 36.5 (2022 06:05), Max: 36.6 (2022 20:20)  T(F): 97.7 (2022 06:05), Max: 97.9 (2022 20:20)  HR: 82 (2022 08:19) (82 - 85)  BP: 110/65 (2022 08:19) (100/62 - 122/72)  BP(mean): --  RR: 20 (2022 06:05) (18 - 20)  SpO2: 95% (2022 06:05) (90% - 95%)    Parameters below as of 2022 06:05  Patient On (Oxygen Delivery Method): nasal cannula  O2 Flow (L/min): 2    GENERAL:  Appears stated age  ABDOMEN:  Soft, non-tender, non-distended, +G-tube, +G-J tube  NEURO:  Alert, minimally verbally responsive      LABS:                        9.0    3.99  )-----------( 286      ( 2022 05:12 )             29.4     11-05    143  |  112<H>  |  18  ----------------------------<  101<H>  3.4<L>   |  28  |  0.24<L>    Ca    8.9      2022 05:12    TPro  6.8  /  Alb  2.7<L>  /  TBili  0.4  /  DBili  x   /  AST  12<L>  /  ALT  6<L>  /  AlkPhos  79  11-05    PT/INR - ( 2022 15:25 )   PT: 12.9 sec;   INR: 1.10 ratio         PTT - ( 2022 15:25 )  PTT:33.7 sec  Urinalysis Basic - ( 2022 15:25 )    Color: Yellow / Appearance: Turbid / S.015 / pH: x  Gluc: x / Ketone: Trace  / Bili: Negative / Urobili: Negative   Blood: x / Protein: 100 / Nitrite: Positive   Leuk Esterase: Moderate / RBC: 3-5 /HPF / WBC 6-10   Sq Epi: x / Non Sq Epi: Occasional / Bacteria: Many        RADIOLOGY & ADDITIONAL TESTS:  < from: CT Abdomen and Pelvis No Cont (22 @ 16:48) >    ACC: [id number] EXAM:  CT ABDOMEN AND PELVIS                        ACC: [id number] EXAM:  CT CHEST                          PROCEDURE DATE:  2022          INTERPRETATION:  CLINICAL INFORMATION: Abdominal tenderness, weakness,   hypoxia    COMPARISON: None.    CONTRAST/COMPLICATIONS:  IV Contrast: None  Oral Contrast: None  Complications: None reported    PROCEDURE:  CT of the Chest, Abdomen and Pelvis was performed.  Sagittal and coronal reformats were performed.    FINDINGS:    CHEST:  LUNGS AND LARGE AIRWAYS: Copious central airway secretions. Patchy left   lower lobe and lingular opacities may represent combination of mucoid   impaction and infection. Mild right basilar atelectasis.  PLEURA: No pleural effusion or pneumothorax.  VESSELS: Normal caliber of the thoracic aorta and main pulmonary artery.   Atheromatous changes.  HEART: Heart size is within normal limits. Coronary artery   calcifications. Aortic valve calcifications. No pericardial effusion.  MEDIASTINUM AND DANELLE: Small volume lymph nodes. Esophagus is   underdistended.  CHEST WALL AND LOWER NECK: No chest wall hematoma. Visualized thyroid is   unremarkable. Bilateral anterior chest wall electronic device is noted.    ABDOMEN AND PELVIS:    Solid organ evaluation is limited due to noncontrast technique.    LIVER: Liver size within normal limits. Subcentimeter hypodensity too   small to characterize.  BILE DUCTS: No biliary distention  GALLBLADDER: Contracted  SPLEEN: Mildly enlarged, 13.7 cm in craniocaudal dimension  PANCREAS: No acute peripancreatic inflammation  ADRENALS: Unremarkable  KIDNEYS/URETERS: No hydronephrosis of the kidneys. Bilateral   nonobstructing calculi, staghorn in appearance, extending from the   collecting systems to the renal pelvises. The mid to distal ureters are   not adequately assessed due to significant streak artifact. Left renal   cyst.    BLADDER: Minimally distended with in situ Diallo catheter. Suboptimally   assessed due to significant streak artifact in the pelvis. However, there   appears to be mild perivesicular stranding.  REPRODUCTIVE ORGANS: Probable myomatous uterus, suboptimally assessed due   to significant streak artifact in the pelvis.    BOWEL: Limited evaluation due to lack of oral contrast. There appear to   be 2 gastrostomy tubes localized to the stomach. The stomach is   underdistended. There is no small bowel distention. However, there is a   long tubular catheter localized to the left mid abdominal small bowel,   without connection to the abdominal wall, possibly a GJ tube that has   disconnected. Appendix is noninflamed in nondistended. Moderate stool   burden of the colon and rectum limits evaluation of the mucosa.  PERITONEUM: No ascites. No free air.  VESSELS: No abdominal aortic aneurysm. Atheromatous changes. IVC filter.  RETROPERITONEUM/LYMPH NODES: Small-volume notes.  ABDOMINAL WALL: Postsurgical changes of the abdominal wall. There appears   to be 2 gastrostomy tubes localized to the stomach. Soft tissue   thickening with ulceration at the sacrococcygeal region. Correlate with   direct visualization for decubitus ulcer.  BONES: Osseous demineralization and degenerative changes. Multilevel   spinal ankylosis. Left hip prosthesis is partially imaged. Old   right-sided rib fracture.    IMPRESSION:    Noncontrast study.    CHEST:  1. Copious central airway secretions. Patchy left lower lobe and lingular   opacities may represent combination of mucoid impaction and   infection/pneumonia. Mild right basilar atelectasis. Follow to resolution   with repeat chest CT in one month, or sooner as clinically warranted.  2. Coronary artery calcifications. Aortic valve calcifications.    ABDOMEN/PELVIS:  1. G tube localized to stomach. Second G tube localized to stomach with   separate catheter localized to the mid left abdomen small bowel, possibly   a GJ tube that has disconnected. Correlate with clinical history. No   small bowel distention. No free air.  2. Moderate stool burden in the colon and rectum. Correlate for   constipation.  3. Mild inflammation of the urinary bladder. Bilateral nonobstructing   calculi, staghorn in appearance, extending from the collecting systems to   the renal pelvises. No hydronephrosis or kidneys. Findings are concerning   for urinary bladder cystitis/urinary tract infection. Correlate with   urinalysis.  4. Soft tissue thickening with ulceration at the sacrococcygeal region.   Correlate with direct visualization for decubitus ulcer.    Dr. Neely discussed these findings with Dr. Burden from ER on 2022   at 5:21 PM, with read back.    --- End of Report ---            MICHELLE NEELY M.D., ATTENDING RADIOLOGIST  This document has been electronically signed. 2022  5:28PM    < end of copied text >

## 2022-11-06 NOTE — PROGRESS NOTE ADULT - PROBLEM SELECTOR PLAN 1
- No emergent surgical intervention at this time as patient without evidence of obstruction.  - Conservative management; monitor for passage of dislodged fragment of GJ tube.  - +BMs

## 2022-11-06 NOTE — PROGRESS NOTE ADULT - ASSESSMENT
G-tube clogged  G-J tube malfunction    G-tube was unclogged at bedside yesterday  Catheter from GJ tube found to be disconnected on CT  Surgical evaluation noted  No current obstruction  Monitor for passage  No GI objection to start feeds  May need eventual GJ replacement with IR  Will follow    I reviewed the overnight course of events on the unit, re-confirming the patient history. I discussed the care with the patient and their family  Differential diagnosis and plan of care discussed with patient after the evaluation  35 minutes spent on total encounter of which more than fifty percent of the encounter was spent counseling and/or coordinating care by the attending physician.  Advanced care planning was discussed with patient and family.  Advanced care planning forms were reviewed and discussed.  Risks, benefits and alternatives of gastroenterologic procedures were discussed in detail and all questions were answered.

## 2022-11-06 NOTE — PROGRESS NOTE ADULT - SUBJECTIVE AND OBJECTIVE BOX
CHIEF COMPLAINT/INTERVAL HISTORY:  Pt. seen and evaluated for GJ tube dislodgement and influenza A infection.  Pt. is in no distress.  Tolerating tamiflu and IV antibiotics for UTI.  She denies having N/V or abdominal pain.  Reports having BMs.      REVIEW OF SYSTEMS:  No fever, CP, SOB, or abdominal pain    Vital Signs Last 24 Hrs  T(C): 36.5 (2022 06:05), Max: 36.6 (2022 20:20)  T(F): 97.7 (2022 06:05), Max: 97.9 (2022 20:20)  HR: 82 (2022 08:19) (82 - 85)  BP: 110/65 (2022 08:19) (100/62 - 122/72)  BP(mean): --  RR: 20 (2022 06:05) (18 - 20)  SpO2: 95% (2022 06:05) (90% - 95%)    Parameters below as of 2022 06:05  Patient On (Oxygen Delivery Method): nasal cannula  O2 Flow (L/min): 2      PHYSICAL EXAM:  GENERAL: NAD  HEENT: EOMI, hearing normal, conjunctiva and sclera clear  Chest: diminished BS bilaterally, no wheezing  CV: S1S2, RRR,   GI: soft, +BS, NT/ND, +Gtube, +GJ tube, rash of lower abdomen  Musculoskeletal: no edema, contracted LE  Psychiatric: affect nL, mood nL  Skin: warm and dry    LABS:                        8.4    4.20  )-----------( 269      ( 2022 08:32 )             27.3     11-06    144  |  112<H>  |  16  ----------------------------<  79  3.8   |  24  |  <0.20<L>    Ca    8.6      2022 08:32  Mg     2.0     11-06    TPro  6.8  /  Alb  2.7<L>  /  TBili  0.4  /  DBili  x   /  AST  12<L>  /  ALT  6<L>  /  AlkPhos  79  11-05    PT/INR - ( 2022 15:25 )   PT: 12.9 sec;   INR: 1.10 ratio         PTT - ( 2022 15:25 )  PTT:33.7 sec  Urinalysis Basic - ( 2022 15:25 )    Color: Yellow / Appearance: Turbid / S.015 / pH: x  Gluc: x / Ketone: Trace  / Bili: Negative / Urobili: Negative   Blood: x / Protein: 100 / Nitrite: Positive   Leuk Esterase: Moderate / RBC: 3-5 /HPF / WBC 6-10   Sq Epi: x / Non Sq Epi: Occasional / Bacteria: Many

## 2022-11-06 NOTE — PROGRESS NOTE ADULT - SUBJECTIVE AND OBJECTIVE BOX
[facility], Division of Infectious Diseases  OLLIE Herman Y. Patel, S. Shah, G. [facility]  [phone]    Name: MATEO LESLIE  Age: 72y  Gender: Female  MRN: [id number]    Interval History:  Patient seen at bedside  No acute overnight events. Afebrile  Notes reviewed    Antibiotics:  meropenem  IVPB 1000 milliGRAM(s) IV Intermittent every 8 hours  oseltamivir 75 milliGRAM(s) Oral two times a day      Medications:  acetaminophen     Tablet .. 650 milliGRAM(s) Oral every 6 hours PRN  baclofen 2.5 milliGRAM(s) Oral <User Schedule>  bisacodyl Suppository 10 milliGRAM(s) Rectal daily PRN  carbidopa/levodopa  25/100 1 Tablet(s) Oral <User Schedule>  Dakins Solution - 1/4 Strength 1 Application(s) Topical two times a day  enoxaparin Injectable 40 milliGRAM(s) SubCutaneous every 24 hours  famotidine    Tablet 20 milliGRAM(s) Oral daily  influenza  Vaccine (HIGH DOSE) 0.7 milliLiter(s) IntraMuscular once  magnesium hydroxide Suspension 30 milliLiter(s) Oral daily PRN  meropenem  IVPB 1000 milliGRAM(s) IV Intermittent every 8 hours  midodrine. 5 milliGRAM(s) Oral <User Schedule>  mirtazapine 15 milliGRAM(s) Oral daily  multivitamin/minerals 1 Tablet(s) Oral daily  ondansetron    Tablet 4 milliGRAM(s) Oral two times a day  oseltamivir 75 milliGRAM(s) Oral two times a day  polyethylene glycol 3350 17 Gram(s) Oral daily  potassium chloride  10 mEq/100 mL IVPB 10 milliEquivalent(s) IV Intermittent every 1 hour  rotigotine patch 4 mG/24 Hr(s) 1 Patch Transdermal every 24 hours  saline laxative (FLEET) Rectal Enema 1 Enema Rectal once PRN  sodium chloride 0.45% with potassium chloride 20 mEq/L 1000 milliLiter(s) IV Continuous <Continuous>      ROS unable to obtain    Allergies: Ceclor (Rash)  IV Contrast (Hypotension)  latex (Rash; Urticaria)    For details regarding the patient's past medical history, social history, family history, and other miscellaneous elements, please refer the initial infectious diseases consultation and/or the admitting history and physical examination for this admission.    Objective:  Vitals:   T(C): 36.7 (22 @ 11:20), Max: 36.7 (22 @ 11:20)  HR: 84 (22 @ 11:20) (82 - 85)  BP: 106/68 (22 @ 11:20) (100/62 - 122/72)  RR: 18 (22 @ 11:20) (18 - 20)  SpO2: 94% (22 @ 11:20) (90% - 95%)    Physical Examination:  General: no acute distress  HEENT: NC/AT  Cardio: S1, S2 heard, RRR, no murmurs  Resp: decreased b/l breath sounds  Abd: soft, NT, ND  Ext: no edema or cyanosis  Skin: warm, dry, no visible rash      Laboratory Studies:  CBC:                       8.4    4.20  )-----------( 269      ( 2022 08:32 )             27.3     CMP:     144  |  112<H>  |  16  ----------------------------<  79  3.8   |  24  |  <0.20<L>    Ca    8.6      2022 08:32  Mg     2.0     -    TPro  6.8  /  Alb  2.7<L>  /  TBili  0.4  /  DBili  x   /  AST  12<L>  /  ALT  6<L>  /  AlkPhos  79  11-    LIVER FUNCTIONS - ( 2022 05:12 )  Alb: 2.7 g/dL / Pro: 6.8 g/dL / ALK PHOS: 79 U/L / ALT: 6 U/L / AST: 12 U/L / GGT: x           Urinalysis Basic - ( 2022 15:25 )    Color: Yellow / Appearance: Turbid / S.015 / pH: x  Gluc: x / Ketone: Trace  / Bili: Negative / Urobili: Negative   Blood: x / Protein: 100 / Nitrite: Positive   Leuk Esterase: Moderate / RBC: 3-5 /HPF / WBC 6-10   Sq Epi: x / Non Sq Epi: Occasional / Bacteria: Many        Microbiology: reviewed    Culture - Urine (collected 22 @ 17:45)  Source: Clean Catch Clean Catch (Midstream)  Preliminary Report (22 @ 14:45):    >100,000 CFU/ml Gram Negative Rods    Culture - Blood (collected 22 @ 15:25)  Source: .Blood Blood-Peripheral  Preliminary Report (22 @ 01:09):    No growth to date.    Culture - Blood (collected 22 @ 15:15)  Source: .Blood Blood-Peripheral  Preliminary Report (22 @ 01:09):    No growth to date.          Radiology: reviewed

## 2022-11-06 NOTE — PROGRESS NOTE ADULT - ASSESSMENT
72y Female with PMHx of terminal Parkinson's (bedbound), constipation, depression, arthritis, hx of partial colon resection presenting with clogged PEG G-tube being admitted for disconnected GJ-tube and UTI.     Problem/Plan - 1:  ·  Problem: Encounter for gastrojejunal tube placement.   ·  Plan: Pt w/ both chronic G-tube and GJ-tube  - Presented initially with clogged G-tube which was resolved in ED, however CT noted disconnected GJ tube  - CT abd/pelvis showed G tube localized to stomach. Second G tube localized to stomach with separate catheter localized to the mid left abdomen small bowel, possibly a GJ tube that has disconnected.  - NPO except meds for now pending recs from GI and surgery.  Start 1/2NS +KCl 20meq@75cc/hr x 12 hours  - Resume tube feeds when possible with Jevity 1.5mg 60cc/hr x14hrs (from 16:00 to 6:00) with 25mL free water flush every hour (from 16:00 to 6:00) and 200mL water bolus flush q8h  - NOTE: please use PEG tube for all meds and tube feeds. GJ tube is ONLY used for duopa - and currently NOT usable until further evaluation and workup.  - GI Dr. Stringer consulted and following  - Surgery Dr. Saeed consulted, f/u recs.     Problem/Plan - 2:  ·  Problem: Acute UTI.   ·  Plan: Pt w/ chronic jimenez, replaced in the ED  - Pt recently found to have ESBL proteus on recent admission 9/2022 and treated with ertapenem and meropenem  - CT abd/pelvis showed bilateral nonobstructive calculi, staghorn in appearance, extending from the collecting systems to the renal pelvises. No hydronephrosis or kidneys. Findings are concerning for urinary bladder cystitis/urinary tract infection. Soft tissue thickening with ulceration at the sacrococcygeal region.  - UA +nitrite, mod leuk est, pH 9.0, mod blood, many bacteria  - WBC 11.1 and Lactate 2.4 -> 1.7 on admission  - Given 1L NS bolus and 1g Aztreonam in the ED  - Cont on meropenem 1g q8h  - Tylenol 650mg PRN for fever and pain  - F/u UCx, BCx x2  - Trend WBC and monitor for fever  - ID Dr. Johns consulted, f/u recs.     Problem/Plan - 3:  ·  Problem: Influenza.   ·  Plan: Pt w/ congestive cough and noted diffuse rhonchi on physical exam, denies SOB  - RVP +influenza  - CT chest showed copious central airway secretions. Patchy left lower lobe and lingular opacities may represent combination of mucoid impaction and infection/pneumonia. Mild right basilar atelectasis. Coronary artery calcifications. Aortic valve calcifications.  - Cont Tamiflu for 5 days  - Currently satting well on 2L O2 nasal cannula  - Supplemental O2 PRN, wean and titrated as needed  - Monitor respiratory status  - Continuous pulse ox  - ID Dr. Johns consulted, f/u recs.     Problem/Plan - 4:  ·  Problem: Parkinson's disease.   ·  Plan: Terminal Parkinson's disease  - Pt is bedbound and unable to move extremities  - Continue home neupro patch  - Continue home baclofen for spasticity  - HOLD duopa for now - because this medication is administered through GJ tube which is currently not functioning - spouse has brought it from home - will need to call pharmacy to free-text enter it once GJ tube is usable again.  Started on Sinemet via PEG Q2h while awake  - Aspiration precautions  - Fall risk precautions.     Problem/Plan - 5:  ·  Problem: Constipation.   ·  Plan: Chronic constipation on many home laxatives and stool softeners  - CT abd/pelvis showed moderate stool burden in the colon and rectum.   - Miralax standing  - MOM PRN  - Bisacodyl suppository PRN  - Fleet enema PRN.     Problem/Plan - 6:  ·  Problem: Depression.   ·  Plan: Chronic  - Continue home mirtazapine.     Problem/Plan - 7:  ·  Problem: Need for prophylactic measure.   ·  Plan: DVT ppx w/ lovenox 40mg qd.

## 2022-11-06 NOTE — PROGRESS NOTE ADULT - ASSESSMENT
72y Female with PMHx of terminal Parkinson's (bedbound), constipation, depression, arthritis, hx of partial colon resection presenting with clogged PEG G-tube being admitted for disconnected GJ-tube and UTI.    Acute Influenza  Acute Cystitis 2/2 CAUTI  - hx of chronic jimenez, replaced in the Ed  - most recent cx ESBL proteus on recent admission 9/2022 and treated with ertapenem and meropenem  - CT abd/pelvis showed bilateral nonobstructive calculi, staghorn in appearance, extending from the collecting systems to the renal pelvises. No hydronephrosis or kidneys. Findings are concerning for urinary bladder cystitis/urinary tract infection. Soft tissue thickening with ulceration at the sacrococcygeal region.  - UA +nitrite, mod leuk est, pH 9.0, mod blood, many bacteria  - WBC 11.1 and Lactate 2.4 -> 1.7 on admission  - s/p Aztreonam in the ED  Plan:   C/w tamiflu x5 days  C/w meropenem  F/u cx--UCx GNR  Trend temps/WBC  Supportive care/supplemental O2 as needed  Maintain aspiration precautions    Dr. Saini to resume service in AM  Infectious Diseases will continue to follow. Please call with any questions.   Deann Zamarripa M.D.  [facility] of [facility] [phone]

## 2022-11-07 ENCOUNTER — TRANSCRIPTION ENCOUNTER (OUTPATIENT)
Age: 72
End: 2022-11-07

## 2022-11-07 LAB
ANION GAP SERPL CALC-SCNC: 5 MMOL/L — SIGNIFICANT CHANGE UP (ref 5–17)
BASOPHILS # BLD AUTO: 0.02 K/UL — SIGNIFICANT CHANGE UP (ref 0–0.2)
BASOPHILS NFR BLD AUTO: 0.4 % — SIGNIFICANT CHANGE UP (ref 0–2)
BUN SERPL-MCNC: 14 MG/DL — SIGNIFICANT CHANGE UP (ref 7–23)
CALCIUM SERPL-MCNC: 8.6 MG/DL — SIGNIFICANT CHANGE UP (ref 8.5–10.1)
CHLORIDE SERPL-SCNC: 110 MMOL/L — HIGH (ref 96–108)
CO2 SERPL-SCNC: 26 MMOL/L — SIGNIFICANT CHANGE UP (ref 22–31)
CREAT SERPL-MCNC: <0.2 MG/DL — LOW (ref 0.5–1.3)
EGFR: 131 ML/MIN/1.73M2 — SIGNIFICANT CHANGE UP
EOSINOPHIL # BLD AUTO: 0.3 K/UL — SIGNIFICANT CHANGE UP (ref 0–0.5)
EOSINOPHIL NFR BLD AUTO: 6.3 % — HIGH (ref 0–6)
GLUCOSE SERPL-MCNC: 117 MG/DL — HIGH (ref 70–99)
GRAM STN FLD: SIGNIFICANT CHANGE UP
HCT VFR BLD CALC: 27.7 % — LOW (ref 34.5–45)
HGB BLD-MCNC: 8.5 G/DL — LOW (ref 11.5–15.5)
IMM GRANULOCYTES NFR BLD AUTO: 0.2 % — SIGNIFICANT CHANGE UP (ref 0–0.9)
LYMPHOCYTES # BLD AUTO: 1.19 K/UL — SIGNIFICANT CHANGE UP (ref 1–3.3)
LYMPHOCYTES # BLD AUTO: 24.9 % — SIGNIFICANT CHANGE UP (ref 13–44)
MAGNESIUM SERPL-MCNC: 1.9 MG/DL — SIGNIFICANT CHANGE UP (ref 1.6–2.6)
MCHC RBC-ENTMCNC: 25.8 PG — LOW (ref 27–34)
MCHC RBC-ENTMCNC: 30.7 GM/DL — LOW (ref 32–36)
MCV RBC AUTO: 83.9 FL — SIGNIFICANT CHANGE UP (ref 80–100)
MONOCYTES # BLD AUTO: 0.36 K/UL — SIGNIFICANT CHANGE UP (ref 0–0.9)
MONOCYTES NFR BLD AUTO: 7.5 % — SIGNIFICANT CHANGE UP (ref 2–14)
NEUTROPHILS # BLD AUTO: 2.89 K/UL — SIGNIFICANT CHANGE UP (ref 1.8–7.4)
NEUTROPHILS NFR BLD AUTO: 60.7 % — SIGNIFICANT CHANGE UP (ref 43–77)
NRBC # BLD: 0 /100 WBCS — SIGNIFICANT CHANGE UP (ref 0–0)
PLATELET # BLD AUTO: 276 K/UL — SIGNIFICANT CHANGE UP (ref 150–400)
POTASSIUM SERPL-MCNC: 4.5 MMOL/L — SIGNIFICANT CHANGE UP (ref 3.5–5.3)
POTASSIUM SERPL-SCNC: 4.5 MMOL/L — SIGNIFICANT CHANGE UP (ref 3.5–5.3)
RBC # BLD: 3.3 M/UL — LOW (ref 3.8–5.2)
RBC # FLD: 16 % — HIGH (ref 10.3–14.5)
SODIUM SERPL-SCNC: 141 MMOL/L — SIGNIFICANT CHANGE UP (ref 135–145)
WBC # BLD: 4.77 K/UL — SIGNIFICANT CHANGE UP (ref 3.8–10.5)
WBC # FLD AUTO: 4.77 K/UL — SIGNIFICANT CHANGE UP (ref 3.8–10.5)

## 2022-11-07 PROCEDURE — 99233 SBSQ HOSP IP/OBS HIGH 50: CPT

## 2022-11-07 RX ADMIN — Medication 2.5 MILLIGRAM(S): at 17:37

## 2022-11-07 RX ADMIN — ROTIGOTINE 1 PATCH: 8 PATCH, EXTENDED RELEASE TRANSDERMAL at 19:18

## 2022-11-07 RX ADMIN — MIDODRINE HYDROCHLORIDE 5 MILLIGRAM(S): 2.5 TABLET ORAL at 08:33

## 2022-11-07 RX ADMIN — Medication 1 APPLICATION(S): at 18:30

## 2022-11-07 RX ADMIN — CARBIDOPA AND LEVODOPA 1 TABLET(S): 25; 100 TABLET ORAL at 12:24

## 2022-11-07 RX ADMIN — ONDANSETRON 4 MILLIGRAM(S): 8 TABLET, FILM COATED ORAL at 05:37

## 2022-11-07 RX ADMIN — CARBIDOPA AND LEVODOPA 1 TABLET(S): 25; 100 TABLET ORAL at 05:37

## 2022-11-07 RX ADMIN — Medication 75 MILLIGRAM(S): at 05:37

## 2022-11-07 RX ADMIN — CARBIDOPA AND LEVODOPA 1 TABLET(S): 25; 100 TABLET ORAL at 10:50

## 2022-11-07 RX ADMIN — CARBIDOPA AND LEVODOPA 1 TABLET(S): 25; 100 TABLET ORAL at 08:33

## 2022-11-07 RX ADMIN — CARBIDOPA AND LEVODOPA 1 TABLET(S): 25; 100 TABLET ORAL at 17:38

## 2022-11-07 RX ADMIN — MIDODRINE HYDROCHLORIDE 5 MILLIGRAM(S): 2.5 TABLET ORAL at 17:37

## 2022-11-07 RX ADMIN — MIRTAZAPINE 15 MILLIGRAM(S): 45 TABLET, ORALLY DISINTEGRATING ORAL at 12:24

## 2022-11-07 RX ADMIN — MEROPENEM 100 MILLIGRAM(S): 1 INJECTION INTRAVENOUS at 17:37

## 2022-11-07 RX ADMIN — Medication 75 MILLIGRAM(S): at 17:38

## 2022-11-07 RX ADMIN — Medication 2.5 MILLIGRAM(S): at 08:33

## 2022-11-07 RX ADMIN — ONDANSETRON 4 MILLIGRAM(S): 8 TABLET, FILM COATED ORAL at 17:38

## 2022-11-07 RX ADMIN — CARBIDOPA AND LEVODOPA 1 TABLET(S): 25; 100 TABLET ORAL at 21:00

## 2022-11-07 RX ADMIN — CARBIDOPA AND LEVODOPA 1 TABLET(S): 25; 100 TABLET ORAL at 00:03

## 2022-11-07 RX ADMIN — MEROPENEM 100 MILLIGRAM(S): 1 INJECTION INTRAVENOUS at 05:36

## 2022-11-07 RX ADMIN — ROTIGOTINE 1 PATCH: 8 PATCH, EXTENDED RELEASE TRANSDERMAL at 17:53

## 2022-11-07 RX ADMIN — Medication 1 APPLICATION(S): at 05:37

## 2022-11-07 RX ADMIN — ROTIGOTINE 1 PATCH: 8 PATCH, EXTENDED RELEASE TRANSDERMAL at 07:15

## 2022-11-07 RX ADMIN — CARBIDOPA AND LEVODOPA 1 TABLET(S): 25; 100 TABLET ORAL at 18:30

## 2022-11-07 RX ADMIN — ENOXAPARIN SODIUM 40 MILLIGRAM(S): 100 INJECTION SUBCUTANEOUS at 05:37

## 2022-11-07 RX ADMIN — CARBIDOPA AND LEVODOPA 1 TABLET(S): 25; 100 TABLET ORAL at 23:33

## 2022-11-07 RX ADMIN — Medication 1 TABLET(S): at 12:24

## 2022-11-07 RX ADMIN — POLYETHYLENE GLYCOL 3350 17 GRAM(S): 17 POWDER, FOR SOLUTION ORAL at 12:24

## 2022-11-07 RX ADMIN — FAMOTIDINE 20 MILLIGRAM(S): 10 INJECTION INTRAVENOUS at 12:24

## 2022-11-07 RX ADMIN — ROTIGOTINE 1 PATCH: 8 PATCH, EXTENDED RELEASE TRANSDERMAL at 17:49

## 2022-11-07 NOTE — DIETITIAN INITIAL EVALUATION ADULT - PERTINENT LABORATORY DATA
11-07    141  |  110<H>  |  14  ----------------------------<  117<H>  4.5   |  26  |  <0.20<L>    Ca    8.6      07 Nov 2022 07:00  Mg     1.9     11-07    A1C with Estimated Average Glucose Result: 5.6 % (09-06-22 @ 00:07)

## 2022-11-07 NOTE — DIETITIAN INITIAL EVALUATION ADULT - NSFNSGIIOFT_GEN_A_CORE
11-06-22 @ 07:01  -  11-07-22 @ 07:00  --------------------------------------------------------  OUT:  Total OUT: 0 mL    Total NET: 180 mL

## 2022-11-07 NOTE — DIETITIAN INITIAL EVALUATION ADULT - NSICDXPASTSURGICALHX_GEN_ALL_CORE_FT
PAST SURGICAL HISTORY:  Abdominal hernia with mesh, 2003    Basal cell carcinoma removal, mid vertex scalp, 2002    H/O colonoscopy , 2008 polypectomy, ,     H/O ovarian cystectomy right,     History of  10/1982    S/P deep brain stimulator placement 2006, 2 brain pacemakers Model #[id number], batteries replaced 2014

## 2022-11-07 NOTE — PROGRESS NOTE ADULT - SUBJECTIVE AND OBJECTIVE BOX
[facility] DIV[facility] of INFECTIOUS DISEASE  Cash Saini MD PhD, Mya Romero MD, Deann Zamarripa MD, Baldev Haynes MD, Chong Johns MD  and providing coverage with Lucinda Reynoso MD  Providing Infectious Disease Consultations at [facility], [facility], [facility]    Office# [phone] to schedule follow up appointments  Answering Service for urgent calls or New Consults [phone]  Cell# to text for urgent issues Cash Saini [phone]     infectious diseases progress note:    MATEO LESLIE is a 72y y. o. Female patient    Overnight and events of the last 24hrs reviewed    Allergies    Ceclor (Rash)  IV Contrast (Hypotension)  latex (Rash; Urticaria)    Intolerances        ANTIBIOTICS/RELEVANT:  antimicrobials  meropenem  IVPB 1000 milliGRAM(s) IV Intermittent every 8 hours  oseltamivir 75 milliGRAM(s) Oral two times a day    immunologic:  influenza  Vaccine (HIGH DOSE) 0.7 milliLiter(s) IntraMuscular once    OTHER:  acetaminophen     Tablet .. 650 milliGRAM(s) Oral every 6 hours PRN  baclofen 2.5 milliGRAM(s) Oral <User Schedule>  bisacodyl Suppository 10 milliGRAM(s) Rectal daily PRN  carbidopa/levodopa  25/100 1 Tablet(s) Oral <User Schedule>  Dakins Solution - 1/4 Strength 1 Application(s) Topical two times a day  enoxaparin Injectable 40 milliGRAM(s) SubCutaneous every 24 hours  famotidine    Tablet 20 milliGRAM(s) Oral daily  magnesium hydroxide Suspension 30 milliLiter(s) Oral daily PRN  midodrine. 5 milliGRAM(s) Oral <User Schedule>  mirtazapine 15 milliGRAM(s) Oral daily  multivitamin/minerals 1 Tablet(s) Oral daily  ondansetron    Tablet 4 milliGRAM(s) Oral two times a day  polyethylene glycol 3350 17 Gram(s) Oral daily  potassium chloride  10 mEq/100 mL IVPB 10 milliEquivalent(s) IV Intermittent every 1 hour  rotigotine patch 4 mG/24 Hr(s) 1 Patch Transdermal every 24 hours  saline laxative (FLEET) Rectal Enema 1 Enema Rectal once PRN  sodium chloride 0.45% with potassium chloride 20 mEq/L 1000 milliLiter(s) IV Continuous <Continuous>      Objective:  Vital Signs Last 24 Hrs  T(C): 36.8 (07 Nov 2022 13:14), Max: 36.8 (07 Nov 2022 13:14)  T(F): 98.2 (07 Nov 2022 13:14), Max: 98.2 (07 Nov 2022 13:14)  HR: 87 (07 Nov 2022 13:14) (81 - 87)  BP: 108/68 (07 Nov 2022 13:14) (104/66 - 112/68)  BP(mean): --  RR: 18 (07 Nov 2022 13:14) (18 - 18)  SpO2: 92% (07 Nov 2022 13:14) (92% - 96%)    Parameters below as of 07 Nov 2022 13:14  Patient On (Oxygen Delivery Method): nasal cannula  O2 Flow (L/min): 2      T(C): 36.8 (11-07-22 @ 13:14), Max: 36.8 (11-07-22 @ 13:14)  T(C): 36.8 (11-07-22 @ 13:14), Max: 37.1 (11-05-22 @ 05:47)  T(C): 36.8 (11-07-22 @ 13:14), Max: 37.1 (11-05-22 @ 05:47)    PHYSICAL EXAM:  HEENT: NC atraumatic  Neck: supple  Respiratory: no accessory muscle use, breathing comfortably  Cardiovascular: distant  Gastrointestinal: normal appearing, nondistended  Extremities: no clubbing, no cyanosis,        LABS:                          8.5    4.77  )-----------( 276      ( 07 Nov 2022 07:00 )             27.7       WBC  4.77 11-07 @ 07:00  4.20 11-06 @ 08:32  3.99 11-05 @ 05:12  4.05 11-04 @ 15:25      11-07    141  |  110<H>  |  14  ----------------------------<  117<H>  4.5   |  26  |  <0.20<L>    Ca    8.6      07 Nov 2022 07:00  Mg     1.9     11-07        Creatinine, Serum: <0.20 mg/dL (11-07-22 @ 07:00)  Creatinine, Serum: <0.20 mg/dL (11-06-22 @ 08:32)  Creatinine, Serum: 0.24 mg/dL (11-05-22 @ 05:12)  Creatinine, Serum: 0.36 mg/dL (11-04-22 @ 15:25)                INFLAMMATORY MARKERS      MICROBIOLOGY:              RADIOLOGY & ADDITIONAL STUDIES:

## 2022-11-07 NOTE — DIETITIAN INITIAL EVALUATION ADULT - ENERGY INTAKE
TF providing pt with 1260 citlalli and 54 gms protein via PEG tube.  Receiving tube feeding and tolerating @ goal rate

## 2022-11-07 NOTE — PROGRESS NOTE ADULT - ASSESSMENT
72y Female with PMHx of terminal Parkinson's (bedbound), constipation, depression, arthritis, hx of partial colon resection presenting with clogged PEG G-tube being admitted for disconnected GJ-tube and UTI.     Problem/Plan - 1:  ·  Problem: Encounter for gastrojejunal tube placement.   ·  Plan: Pt w/ both chronic G-tube and GJ-tube  - Presented initially with clogged G-tube which was resolved in ED, however CT noted disconnected GJ tube  - CT abd/pelvis showed G tube localized to stomach. Second G tube localized to stomach with separate catheter localized to the mid left abdomen small bowel, possibly a GJ tube that has disconnected.  - NPO with Jevity tube feeds  - Resumed tube feeds with Jevity 1.5mg 60cc/hr x14hrs (from 16:00 to 6:00) with 25mL free water flush every hour (from 16:00 to 6:00) and 200mL water bolus flush q8h  - NOTE: please use PEG tube for all meds and tube feeds. GJ tube is ONLY used for duopa - and currently NOT usable until further evaluation and workup.  - GI Dr. Stringer consulted and following  - Surgery Dr. Saeed consulted, f/u recs.     Problem/Plan - 2:  ·  Problem: Acute UTI.   ·  Plan: Pt w/ chronic jimenez, replaced in the ED  - Pt recently found to have ESBL proteus on recent admission 9/2022 and treated with ertapenem and meropenem  - CT abd/pelvis showed bilateral nonobstructive calculi, staghorn in appearance, extending from the collecting systems to the renal pelvises. No hydronephrosis or kidneys. Findings are concerning for urinary bladder cystitis/urinary tract infection. Soft tissue thickening with ulceration at the sacrococcygeal region.  - UA +nitrite, mod leuk est, pH 9.0, mod blood, many bacteria  - WBC 11.1 and Lactate 2.4 -> 1.7 on admission  - Given 1L NS bolus and 1g Aztreonam in the ED  - Cont on meropenem 1g q8h  - Tylenol 650mg PRN for fever and pain  - UCx +Providencia stuartii, BCx x2 NGTD  - Trend WBC and monitor for fever  - ID Dr. Johns consulted, f/u recs.     Problem/Plan - 3:  ·  Problem: Influenza.   ·  Plan: Pt w/ congestive cough and noted diffuse rhonchi on physical exam, denies SOB  - RVP +influenza  - CT chest showed copious central airway secretions. Patchy left lower lobe and lingular opacities may represent combination of mucoid impaction and infection/pneumonia. Mild right basilar atelectasis. Coronary artery calcifications. Aortic valve calcifications.  - Cont Tamiflu for 5 days  - Currently satting well on 2L O2 nasal cannula  - Supplemental O2 PRN, wean and titrated as needed  - Monitor respiratory status  - Continuous pulse ox  - ID Dr. Johns consulted, f/u recs.     Problem/Plan - 4:  ·  Problem: Parkinson's disease.   ·  Plan: Terminal Parkinson's disease  - Pt is bedbound and unable to move extremities  - Continue home neupro patch  - Continue home baclofen for spasticity  - HOLD duopa for now - because this medication is administered through GJ tube which is currently not functioning - spouse has brought it from home - will need to call pharmacy to free-text enter it once GJ tube is usable again.  Started on Sinemet via PEG Q2h while awake  - Aspiration precautions  - Fall risk precautions.     Problem/Plan - 5:  ·  Problem: Constipation.   ·  Plan: Chronic constipation on many home laxatives and stool softeners  - CT abd/pelvis showed moderate stool burden in the colon and rectum.   - Miralax standing  - MOM PRN  - Bisacodyl suppository PRN  - Fleet enema PRN.     Problem/Plan - 6:  ·  Problem: Depression.   ·  Plan: Chronic  - Continue home mirtazapine.     Problem/Plan - 7:  ·  Problem: Need for prophylactic measure.   ·  Plan: DVT ppx w/ lovenox 40mg qd.

## 2022-11-07 NOTE — DIETITIAN INITIAL EVALUATION ADULT - ETIOLOGY
related to increased nutrient needs for wound healing  related to probable inadequate energy intake

## 2022-11-07 NOTE — PROGRESS NOTE ADULT - SUBJECTIVE AND OBJECTIVE BOX
[facility]  Nahid Shafer PA-C  [address] [place] [address]  [phone]      INTERVAL HPI/OVERNIGHT EVENTS:  Pt s/e  Reported no abdominal pain today  +peg and GJ tube    MEDICATIONS  (STANDING):  baclofen 2.5 milliGRAM(s) Oral <User Schedule>  carbidopa/levodopa  25/100 1 Tablet(s) Oral <User Schedule>  Dakins Solution - 1/4 Strength 1 Application(s) Topical two times a day  enoxaparin Injectable 40 milliGRAM(s) SubCutaneous every 24 hours  famotidine    Tablet 20 milliGRAM(s) Oral daily  influenza  Vaccine (HIGH DOSE) 0.7 milliLiter(s) IntraMuscular once  meropenem  IVPB 1000 milliGRAM(s) IV Intermittent every 8 hours  midodrine. 5 milliGRAM(s) Oral <User Schedule>  mirtazapine 15 milliGRAM(s) Oral daily  multivitamin/minerals 1 Tablet(s) Oral daily  ondansetron    Tablet 4 milliGRAM(s) Oral two times a day  oseltamivir 75 milliGRAM(s) Oral two times a day  polyethylene glycol 3350 17 Gram(s) Oral daily  potassium chloride  10 mEq/100 mL IVPB 10 milliEquivalent(s) IV Intermittent every 1 hour  rotigotine patch 4 mG/24 Hr(s) 1 Patch Transdermal every 24 hours  sodium chloride 0.45% with potassium chloride 20 mEq/L 1000 milliLiter(s) (75 mL/Hr) IV Continuous <Continuous>    MEDICATIONS  (PRN):  acetaminophen     Tablet .. 650 milliGRAM(s) Oral every 6 hours PRN Temp greater or equal to 38C (100.4F), Mild Pain (1 - 3)  bisacodyl Suppository 10 milliGRAM(s) Rectal daily PRN Constipation  magnesium hydroxide Suspension 30 milliLiter(s) Oral daily PRN Constipation  saline laxative (FLEET) Rectal Enema 1 Enema Rectal once PRN Constipation      Allergies    Ceclor (Rash)  IV Contrast (Hypotension)  latex (Rash; Urticaria)    PHYSICAL EXAM:   Vital Signs:  Vital Signs Last 24 Hrs  T(C): 36.4 (07 Nov 2022 08:28), Max: 36.7 (06 Nov 2022 11:20)  T(F): 97.5 (07 Nov 2022 08:28), Max: 98 (06 Nov 2022 11:20)  HR: 81 (07 Nov 2022 08:28) (81 - 84)  BP: 104/66 (07 Nov 2022 08:28) (104/66 - 112/68)  BP(mean): --  RR: 18 (07 Nov 2022 08:28) (18 - 18)  SpO2: 96% (07 Nov 2022 08:28) (94% - 96%)    Parameters below as of 07 Nov 2022 08:28  Patient On (Oxygen Delivery Method): room air  O2 Flow (L/min): 2    GENERAL:  Appears stated age  ABDOMEN:  Soft, non-tender, non-distended  NEURO:  Alert      LABS:                        8.5    4.77  )-----------( 276      ( 07 Nov 2022 07:00 )             27.7     11-07    141  |  110<H>  |  14  ----------------------------<  117<H>  4.5   |  26  |  <0.20<L>    Ca    8.6      07 Nov 2022 07:00  Mg     1.9     11-07

## 2022-11-07 NOTE — DIETITIAN INITIAL EVALUATION ADULT - NS FNS DIET ORDER
Diet, NPO with Tube Feed:   Tube Feeding Modality: Gastrostomy  Jevity 1.5  Total Volume for 24 Hours (mL): 840  Continuous  Starting Tube Feed Rate {mL per Hour}: 60  Until Goal Tube Feed Rate (mL per Hour): 60  Tube Feed Duration (in Hours): 14  Tube Feed Start Time: 16:00  Tube Feed Stop Time: 06:00  Free Water Flush  Bolus   Total Volume per Flush (mL): 200   Frequency: Every 8 Hours  Pump   Rate (mL per Hour): 25   Frequency: Every Hour    Duration (Hours): 14    Start Time: 16:00    Stop Time: 06:00 (11-06-22 @ 13:02)

## 2022-11-07 NOTE — DISCHARGE NOTE PROVIDER - DETAILS OF MALNUTRITION DIAGNOSIS/DIAGNOSES
This patient has been assessed with a concern for Malnutrition and was treated during this hospitalization for the following Nutrition diagnosis/diagnoses:     -  11/07/2022: Mild protein-calorie malnutrition

## 2022-11-07 NOTE — DIETITIAN INITIAL EVALUATION ADULT - PERTINENT MEDS FT
MEDICATIONS  (STANDING):  baclofen 2.5 milliGRAM(s) Oral <User Schedule>  carbidopa/levodopa  25/100 1 Tablet(s) Oral <User Schedule>  Dakins Solution - 1/4 Strength 1 Application(s) Topical two times a day  enoxaparin Injectable 40 milliGRAM(s) SubCutaneous every 24 hours  famotidine    Tablet 20 milliGRAM(s) Oral daily  influenza  Vaccine (HIGH DOSE) 0.7 milliLiter(s) IntraMuscular once  meropenem  IVPB 1000 milliGRAM(s) IV Intermittent every 8 hours  midodrine. 5 milliGRAM(s) Oral <User Schedule>  mirtazapine 15 milliGRAM(s) Oral daily  multivitamin/minerals 1 Tablet(s) Oral daily  ondansetron    Tablet 4 milliGRAM(s) Oral two times a day  oseltamivir 75 milliGRAM(s) Oral two times a day  polyethylene glycol 3350 17 Gram(s) Oral daily  potassium chloride  10 mEq/100 mL IVPB 10 milliEquivalent(s) IV Intermittent every 1 hour  rotigotine patch 4 mG/24 Hr(s) 1 Patch Transdermal every 24 hours  sodium chloride 0.45% with potassium chloride 20 mEq/L 1000 milliLiter(s) (75 mL/Hr) IV Continuous <Continuous>    MEDICATIONS  (PRN):  acetaminophen     Tablet .. 650 milliGRAM(s) Oral every 6 hours PRN Temp greater or equal to 38C (100.4F), Mild Pain (1 - 3)  bisacodyl Suppository 10 milliGRAM(s) Rectal daily PRN Constipation  magnesium hydroxide Suspension 30 milliLiter(s) Oral daily PRN Constipation  saline laxative (FLEET) Rectal Enema 1 Enema Rectal once PRN Constipation

## 2022-11-07 NOTE — PROGRESS NOTE ADULT - SUBJECTIVE AND OBJECTIVE BOX
SUBJECTIVE:  Patient seen and examined at bedside. No overnight events. Patient reports no new complaints at this time. Admits to flatus and .  Patient denies any fever, chills, chest pain, shortness of breath, nausea, vomiting, or urinary complaints.    VITALS  Vital Signs Last 24 Hrs  T(C): 36.4 (2022 08:28), Max: 36.7 (2022 11:20)  T(F): 97.5 (:), Max: 98 (2022 11:20)  HR: 81 (:) (81 - 84)  BP: 104/66 (:) (104/66 - 112/68)  BP(mean): --  RR: 18 (:) (18 - 18)  SpO2: 96% (:) (94% - 96%)    Parameters below as of 2022 08:28  Patient On (Oxygen Delivery Method): room air  O2 Flow (L/min): 2    PHYSICAL EXAM  GENERAL:  Well-nourished, well-developed Female lying comfortably in bed in NAD.  HEENT:  NC/AT. Sclera white. Mucous membranes moist.  CARDIO:  Regular rate and rhythm.  No murmur, gallop or rub appreciated.  RESPIRATORY:  Nonlabored breathing, no accessory muscle use. Lungs clear to auscultation bilaterally.  No wheezing, rales or rhonchi appreciated.  ABDOMEN:  Soft, nondistended, nontender. BS appreciated on auscultation.  No rebound tenderness or guarding.  EXTREMITIES: No calf tenderness bilaterally.  SKIN:  No jaundice, pallor, or cyanosis  NEURO:  A&O x 3    INTAKE & OUTPUT  I&O's Summary    2022 07:01  -  2022 07:00  --------------------------------------------------------  IN: 180 mL / OUT: 200 mL / NET: -20 mL    I&O's Detail    2022 07:01  -  2022 07:00  --------------------------------------------------------  IN:    Jevity 1.5: 180 mL  Total IN: 180 mL    OUT:    Voided (mL): 200 mL  Total OUT: 200 mL    Total NET: -20 mL    MEDICATIONS  MEDICATIONS  (STANDING):  baclofen 2.5 milliGRAM(s) Oral <User Schedule>  carbidopa/levodopa  25/100 1 Tablet(s) Oral <User Schedule>  Dakins Solution -  Strength 1 Application(s) Topical two times a day  enoxaparin Injectable 40 milliGRAM(s) SubCutaneous every 24 hours  famotidine    Tablet 20 milliGRAM(s) Oral daily  influenza  Vaccine (HIGH DOSE) 0.7 milliLiter(s) IntraMuscular once  meropenem  IVPB 1000 milliGRAM(s) IV Intermittent every 8 hours  midodrine. 5 milliGRAM(s) Oral <User Schedule>  mirtazapine 15 milliGRAM(s) Oral daily  multivitamin/minerals 1 Tablet(s) Oral daily  ondansetron    Tablet 4 milliGRAM(s) Oral two times a day  oseltamivir 75 milliGRAM(s) Oral two times a day  polyethylene glycol 3350 17 Gram(s) Oral daily  potassium chloride  10 mEq/100 mL IVPB 10 milliEquivalent(s) IV Intermittent every 1 hour  rotigotine patch 4 mG/24 Hr(s) 1 Patch Transdermal every 24 hours  sodium chloride 0.45% with potassium chloride 20 mEq/L 1000 milliLiter(s) (75 mL/Hr) IV Continuous <Continuous>    MEDICATIONS  (PRN):  acetaminophen     Tablet .. 650 milliGRAM(s) Oral every 6 hours PRN Temp greater or equal to 38C (100.4F), Mild Pain (1 - 3)  bisacodyl Suppository 10 milliGRAM(s) Rectal daily PRN Constipation  magnesium hydroxide Suspension 30 milliLiter(s) Oral daily PRN Constipation  saline laxative (FLEET) Rectal Enema 1 Enema Rectal once PRN Constipation    LABS:                      8.5    4.77  )-----------( 276      ( 2022 07:00 )             27.7     11-07    141  |  110<H>  |  14  ----------------------------<  117<H>  4.5   |  26  |  <0.20<L>    Ca    8.6      2022 07:00  Mg     1.9     11-07    Culture - Urine (collected 2022 17:45)  Source: Clean Catch Clean Catch (Midstream)  Preliminary Report (2022 14:45):    >100,000 CFU/ml Gram Negative Rods    Culture - Blood (collected 2022 15:25)  Source: .Blood Blood-Peripheral  Preliminary Report (2022 01:09):    No growth to date.    Culture - Blood (collected 2022 15:15)  Source: .Blood Blood-Peripheral  Preliminary Report (2022 01:09):    No growth to date.    ASSESSMENT & PLAN   72 year old female from [facility] with PMH of Parkinson's, constipation, depression, GERD, arthritis, h/o colon resection for prolapsed rectum and ventral hernia repair with mesh (, Dr. Ferreira), ex lap with ANNI for SBO (2022, Dr. Nuñez), L THR (),  (), R ovarian cystectomy (), bedbound with feeding PEG tube (placed 2022, [facility]), and GJ-tube for carbidopa/levodopa pump (placed  by Dr. Oh), with broken GJ tube in L abdomen. VS stable. Does not appear to be clinically or radiographically obstructed at this time.    - Currently NPO w/ TFs  - Monitor progression of GJ tube  - Is & Os  - supportive care, pain control  - Follow up AM labs  - Case discussed with Dr. Saeed

## 2022-11-07 NOTE — DIETITIAN INITIAL EVALUATION ADULT - OTHER INFO
71 y/o female adm from [facility] with UTI; GJ tube dislodgement; pneumonia, foreign body in colon; flu+. PMH acute respiratory failure with hypoxia, GERD, depression, basal cell ca, parkinson's disease (terminal), OA left hip, constipation. Pt visited at bedside this am. Spoke with RN. Pt has been tolerating feedings without any issues.

## 2022-11-07 NOTE — DISCHARGE NOTE PROVIDER - NSDCCPCAREPLAN_GEN_ALL_CORE_FT
PRINCIPAL DISCHARGE DIAGNOSIS  Diagnosis: Foreign body in colon  Assessment and Plan of Treatment: disloged Jtube, need to be monitored for complication      SECONDARY DISCHARGE DIAGNOSES  Diagnosis: Pneumonia  Assessment and Plan of Treatment: completed antibiotic    Diagnosis: Parkinson's disease  Assessment and Plan of Treatment: f/u neurology    Diagnosis: Influenza  Assessment and Plan of Treatment: completed tamiflu    Diagnosis: Foreign body in colon  Assessment and Plan of Treatment: disloged Jtube, need to be monitored for complication

## 2022-11-07 NOTE — DIETITIAN INITIAL EVALUATION ADULT - ORAL INTAKE PTA/DIET HISTORY
PEG feedings  Jevity 1.5 @ 60cc/hr x 14 hrs   free water 200cc q shift  + auto flush @ 25cc/hr x 14 hrs  + LPS 30cc BID

## 2022-11-07 NOTE — DISCHARGE NOTE PROVIDER - HOSPITAL COURSE
The patient is a 72y Female with PMHx of terminal Parkinson's (bedbound), constipation, depression, arthritis, hx of partial colon resection presenting with clogged PEG tube. Patient resides at [facility] and was initally sent in for evaluation of clogged G-tube. Patient is unable to provide much information so history retrieved primarily from chart and  at bedside.  was called this morning for clogged G-tube. In the ED, GI was consulted and was able to fix the G-tube without issues. On repeat CT scan - it was shown that pt had a possible disconnected GJ tube. Patient herself is currently not complaining of any pain or discomfort although noted to have a congested cough. Denies any shortness of breath, chest pain, abdominal pain, urinary discomfort, fevers, or chills.  In the ED,  VS: Temp 98.4F, HR 82, Bp 124/74, RR 18, SpO2 94% on 2L NC  Labs: WBC 11.1, Lactate 2.4 -> 1.7, Alb 3.1  UA +nitrite, mod leuk est, pH 9.0, mod blood, many bacteria  RVP +influenza  Imaging:   CT chest showed copious central airway secretions. Patchy left lower lobe and lingular opacities may represent combination of mucoid impaction and infection/pneumonia. Mild right basilar atelectasis. Coronary artery calcifications. Aortic valve calcifications.  CT abd/pelvis showed G tube localized to stomach. Second G tube localized to stomach with separate catheter localized to the mid left abdomen small bowel, possibly a GJ tube that has disconnected. Moderate stool burden in the colon and rectum. Mild inflammation of the urinary bladder. Bilateral nonobstructing calculi, staghorn in appearance, extending from the collecting systems to the renal pelvises. No hydronephrosis or kidneys. Findings are concerning for urinary bladder cystitis/urinary tract infection. Soft tissue thickening with ulceration at the sacrococcygeal region.  Received: 1L NS bolus, 1g Aztreonam IV    Pt. was admitted with GI, general surgery, and ID consultation.  Her tubefeeds were initially held.  Pt. was started on IV antibiotics and tamiflu.  LE dopplers were negative for DVT.  Urine culture grew Providencia stuartii.  Blood cultures showed no growth.  In regards to dislodged GJ tube patient was monitored.  No surgical intervention was required.  Her tubefeeds were reinitiated and tolerated well. The patient is a 72y Female with PMHx of terminal Parkinson's (bedbound), constipation, depression, arthritis, hx of partial colon resection presenting with clogged PEG tube. Patient resides at [facility] and was initally sent in for evaluation of clogged G-tube. Patient is unable to provide much information so history retrieved primarily from chart and  at bedside.  was called this morning for clogged G-tube. In the ED, GI was consulted and was able to fix the G-tube without issues. On repeat CT scan - it was shown that pt had a possible disconnected GJ tube. Patient herself is currently not complaining of any pain or discomfort although noted to have a congested cough. Denies any shortness of breath, chest pain, abdominal pain, urinary discomfort, fevers, or chills.  In the ED,  VS: Temp 98.4F, HR 82, Bp 124/74, RR 18, SpO2 94% on 2L NC  Labs: WBC 11.1, Lactate 2.4 -> 1.7, Alb 3.1  UA +nitrite, mod leuk est, pH 9.0, mod blood, many bacteria  RVP +influenza  Imaging:   CT chest showed copious central airway secretions. Patchy left lower lobe and lingular opacities may represent combination of mucoid impaction and infection/pneumonia. Mild right basilar atelectasis. Coronary artery calcifications. Aortic valve calcifications.  CT abd/pelvis showed G tube localized to stomach. Second G tube localized to stomach with separate catheter localized to the mid left abdomen small bowel, possibly a GJ tube that has disconnected. Moderate stool burden in the colon and rectum. Mild inflammation of the urinary bladder. Bilateral nonobstructing calculi, staghorn in appearance, extending from the collecting systems to the renal pelvises. No hydronephrosis or kidneys. Findings are concerning for urinary bladder cystitis/urinary tract infection. Soft tissue thickening with ulceration at the sacrococcygeal region.  Received: 1L NS bolus, 1g Aztreonam IV    Pt. was admitted with GI, general surgery, and ID consultation.  Her tubefeeds were initially held.  Pt. was started on IV antibiotics and tamiflu.  LE dopplers were negative for DVT.  Urine culture grew Providencia stuartii.  Blood cultures showed no growth.  In regards to dislodged GJ tube patient was monitored,  No surgical intervention at present,  but need to be monitored for any symptom of complications.  Her tubefeeds were reinitiated and tolerated well.  condition stable, now can be discharged.

## 2022-11-07 NOTE — DIETITIAN INITIAL EVALUATION ADULT - SIGNS/SYMPTOMS
as evidenced by Stage III and unstageable pressure ulcers   as evidenced by 2.7% wt loss/2 mos, moderate temporal muscle depletion.

## 2022-11-07 NOTE — PROGRESS NOTE ADULT - ASSESSMENT
72y Female with PMHx of terminal Parkinson's (bedbound), constipation, depression, arthritis, hx of partial colon resection presenting with clogged PEG G-tube being admitted for disconnected GJ-tube and UTI.    Acute Influenza  Acute Cystitis 2/2 CAUTI  - hx of chronic jimenez, replaced in the Ed  - most recent cx ESBL proteus on recent admission 9/2022 and treated with ertapenem and meropenem  - CT abd/pelvis showed bilateral nonobstructive calculi, staghorn in appearance, extending from the collecting systems to the renal pelvises. No hydronephrosis or kidneys. Findings are concerning for urinary bladder cystitis/urinary tract infection. Soft tissue thickening with ulceration at the sacrococcygeal region.  - UA +nitrite, mod leuk est, pH 9.0, mod blood, many bacteria  - WBC 11.1 and Lactate 2.4 -> 1.7 on admission  - s/p Aztreonam in the ED  Plan:   C/w tamiflu x5 days 11/4-11/8  C/w meropenem 11/4-11/8  F/u cx--UCx GNR ([facility])      Thank you for consulting us and involving us in the management of this most interesting and challenging case.  We will follow along in the care of this patient. Please call us at [phone] or text me directly on my cell# at [phone] with any concerns.

## 2022-11-07 NOTE — DIETITIAN INITIAL EVALUATION ADULT - PHYSCIAL ASSESSMENT
pt with terminal parkinson's disease which may affect muscle appearance  moderate temporal depletion noted.

## 2022-11-07 NOTE — PROGRESS NOTE ADULT - SUBJECTIVE AND OBJECTIVE BOX
CHIEF COMPLAINT/INTERVAL HISTORY:  Pt. seen and evaluated for dislodge GJ tube and influenza A infection.  Pt. is tearful but denies having any abdominal pain or N/V.  Tolerating Tamiflu and IV antibiotics.  Denies having SOB.     REVIEW OF SYSTEMS:  No fever, CP, SOB, or abdominal pain    Vital Signs Last 24 Hrs  T(C): 36.4 (07 Nov 2022 08:28), Max: 36.7 (06 Nov 2022 11:20)  T(F): 97.5 (07 Nov 2022 08:28), Max: 98 (06 Nov 2022 11:20)  HR: 81 (07 Nov 2022 08:28) (81 - 84)  BP: 104/66 (07 Nov 2022 08:28) (104/66 - 112/68)  BP(mean): --  RR: 18 (07 Nov 2022 08:28) (18 - 18)  SpO2: 96% (07 Nov 2022 08:28) (94% - 96%)    Parameters below as of 07 Nov 2022 08:28  Patient On (Oxygen Delivery Method): room air  O2 Flow (L/min): 2      PHYSICAL EXAM:  GENERAL: NAD  HEENT: EOMI, hearing normal, conjunctiva and sclera clear  Chest: diminished BS bilaterally, no wheezing  CV: S1S2, RRR,   GI: soft, +BS, NT/ND, +Gtube, +GJ tube, improved contact dermatitis of lower abdomen  Musculoskeletal: no edema, contracted LE  Psychiatric: tearful, misses her   Skin: warm and dry    LABS:                        8.5    4.77  )-----------( 276      ( 07 Nov 2022 07:00 )             27.7     11-07    141  |  110<H>  |  14  ----------------------------<  117<H>  4.5   |  26  |  <0.20<L>    Ca    8.6      07 Nov 2022 07:00  Mg     1.9     11-07

## 2022-11-07 NOTE — DISCHARGE NOTE PROVIDER - NSDCMRMEDTOKEN_GEN_ALL_CORE_FT
acetaminophen 325 mg oral tablet: 2 tab(s) by PEG tube every 6 hours, As Needed  baclofen: 2.5 milligram(s) by PEG tube 2 times a day  bisacodyl 10 mg rectal suppository: 1 suppository(ies) rectal once a day, As Needed  Dakins Quarter Strength 0.125% topical solution: Apply topically to affected area 2 times a day  Duopa 4.63 mg-20 mg/mL enteral suspension: 3 milliliters (20mg) by jejunostomy tube every hour around the clock.     As per spouse, each cartridge is changed q12 hours  famotidine 20 mg oral tablet: 1 tab(s) orally once a day  Fleet Enema 19 g-7 g rectal enema: 1 application rectal once, As Needed  midodrine 5 mg oral tablet: 1 tab(s) by PEG tube 2 times a day  Hold for SBP &gt; 160 and HR &lt; 60  Milk of Magnesia: 30 milliliter(s) orally once a day, As Needed  mirtazapine 15 mg oral tablet: 1 tab(s) orally once a day  morphine 20 mg/mL oral solution: 0.5 milliliter(s) sublingual every 2 hours, As Needed  Neupro 4 mg/24 hr transdermal film, extended release: 1 patch transdermal once a day  ondansetron 4 mg oral tablet: 1 tab(s) orally 2 times a day  polyethylene glycol 3350 oral powder for reconstitution: 17 gram(s) orally once a day  Stress 600 with Zinc oral tablet: 1 tab(s) orally once a day   acetaminophen 325 mg oral tablet: 2 tab(s) by PEG tube every 6 hours, As Needed  baclofen: 2.5 milligram(s) by PEG tube 2 times a day  bisacodyl 10 mg rectal suppository: 1 suppository(ies) rectal once a day, As Needed  clotrimazole-betamethasone dipropionate 1%-0.05% topical cream: 1 application topically 2 times a day  midodrine 5 mg oral tablet: 1 tab(s) by PEG tube 2 times a day  Hold for SBP &gt; 160 and HR &lt; 60  Neupro 4 mg/24 hr transdermal film, extended release: 1 patch transdermal once a day  povidone iodine 10% topical solution: 1 application topically once a day

## 2022-11-07 NOTE — DIETITIAN INITIAL EVALUATION ADULT - ENTERAL
Consider increasing TF to 60cc/hr x 18 hrs to provide pt with 1620 citlalli and 69 gms of protein   continue flushes of 200 cc q 8 hrs with auto flush of 25cc/hr x 18 hrs (provides pt with a total of 1871 cc free water.

## 2022-11-07 NOTE — DIETITIAN NUTRITION RISK NOTIFICATION - TREATMENT: THE FOLLOWING DIET HAS BEEN RECOMMENDED
Diet, NPO with Tube Feed:   Tube Feeding Modality: Gastrostomy  Jevity 1.5  Total Volume for 24 Hours (mL): 840  Continuous  Starting Tube Feed Rate {mL per Hour}: 60  Until Goal Tube Feed Rate (mL per Hour): 60  Tube Feed Duration (in Hours): 14  Tube Feed Start Time: 16:00  Tube Feed Stop Time: 06:00  Free Water Flush  Bolus   Total Volume per Flush (mL): 200   Frequency: Every 8 Hours  Pump   Rate (mL per Hour): 25   Frequency: Every Hour    Duration (Hours): 14    Start Time: 16:00    Stop Time: 06:00 (11-06-22 @ 13:02) [Active]

## 2022-11-08 LAB
-  AMIKACIN: SIGNIFICANT CHANGE UP
-  AMOXICILLIN/CLAVULANIC ACID: SIGNIFICANT CHANGE UP
-  AMPICILLIN/SULBACTAM: SIGNIFICANT CHANGE UP
-  AMPICILLIN: SIGNIFICANT CHANGE UP
-  AZTREONAM: SIGNIFICANT CHANGE UP
-  CEFAZOLIN: SIGNIFICANT CHANGE UP
-  CEFEPIME: SIGNIFICANT CHANGE UP
-  CEFOXITIN: SIGNIFICANT CHANGE UP
-  CEFTRIAXONE: SIGNIFICANT CHANGE UP
-  CIPROFLOXACIN: SIGNIFICANT CHANGE UP
-  CORYNEBACTERIUM SPECIES: SIGNIFICANT CHANGE UP
-  ERTAPENEM: SIGNIFICANT CHANGE UP
-  LEVOFLOXACIN: SIGNIFICANT CHANGE UP
-  MEROPENEM: SIGNIFICANT CHANGE UP
-  NITROFURANTOIN: SIGNIFICANT CHANGE UP
-  PIPERACILLIN/TAZOBACTAM: SIGNIFICANT CHANGE UP
-  TRIMETHOPRIM/SULFAMETHOXAZOLE: SIGNIFICANT CHANGE UP
ANION GAP SERPL CALC-SCNC: 8 MMOL/L — SIGNIFICANT CHANGE UP (ref 5–17)
BASOPHILS # BLD AUTO: 0.02 K/UL — SIGNIFICANT CHANGE UP (ref 0–0.2)
BASOPHILS NFR BLD AUTO: 0.4 % — SIGNIFICANT CHANGE UP (ref 0–2)
BUN SERPL-MCNC: 12 MG/DL — SIGNIFICANT CHANGE UP (ref 7–23)
CALCIUM SERPL-MCNC: 8.7 MG/DL — SIGNIFICANT CHANGE UP (ref 8.5–10.1)
CHLORIDE SERPL-SCNC: 108 MMOL/L — SIGNIFICANT CHANGE UP (ref 96–108)
CO2 SERPL-SCNC: 25 MMOL/L — SIGNIFICANT CHANGE UP (ref 22–31)
CREAT SERPL-MCNC: 0.27 MG/DL — LOW (ref 0.5–1.3)
CULTURE RESULTS: SIGNIFICANT CHANGE UP
EGFR: 114 ML/MIN/1.73M2 — SIGNIFICANT CHANGE UP
EOSINOPHIL # BLD AUTO: 0.22 K/UL — SIGNIFICANT CHANGE UP (ref 0–0.5)
EOSINOPHIL NFR BLD AUTO: 3.9 % — SIGNIFICANT CHANGE UP (ref 0–6)
GLUCOSE SERPL-MCNC: 145 MG/DL — HIGH (ref 70–99)
HCT VFR BLD CALC: 32.5 % — LOW (ref 34.5–45)
HGB BLD-MCNC: 10.1 G/DL — LOW (ref 11.5–15.5)
IMM GRANULOCYTES NFR BLD AUTO: 0.4 % — SIGNIFICANT CHANGE UP (ref 0–0.9)
LYMPHOCYTES # BLD AUTO: 1.57 K/UL — SIGNIFICANT CHANGE UP (ref 1–3.3)
LYMPHOCYTES # BLD AUTO: 28 % — SIGNIFICANT CHANGE UP (ref 13–44)
MCHC RBC-ENTMCNC: 25.8 PG — LOW (ref 27–34)
MCHC RBC-ENTMCNC: 31.1 GM/DL — LOW (ref 32–36)
MCV RBC AUTO: 82.9 FL — SIGNIFICANT CHANGE UP (ref 80–100)
METHOD TYPE: SIGNIFICANT CHANGE UP
METHOD TYPE: SIGNIFICANT CHANGE UP
MONOCYTES # BLD AUTO: 0.28 K/UL — SIGNIFICANT CHANGE UP (ref 0–0.9)
MONOCYTES NFR BLD AUTO: 5 % — SIGNIFICANT CHANGE UP (ref 2–14)
NEUTROPHILS # BLD AUTO: 3.5 K/UL — SIGNIFICANT CHANGE UP (ref 1.8–7.4)
NEUTROPHILS NFR BLD AUTO: 62.3 % — SIGNIFICANT CHANGE UP (ref 43–77)
NRBC # BLD: 0 /100 WBCS — SIGNIFICANT CHANGE UP (ref 0–0)
ORGANISM # SPEC MICROSCOPIC CNT: SIGNIFICANT CHANGE UP
ORGANISM # SPEC MICROSCOPIC CNT: SIGNIFICANT CHANGE UP
PLATELET # BLD AUTO: 349 K/UL — SIGNIFICANT CHANGE UP (ref 150–400)
POTASSIUM SERPL-MCNC: 4.1 MMOL/L — SIGNIFICANT CHANGE UP (ref 3.5–5.3)
POTASSIUM SERPL-SCNC: 4.1 MMOL/L — SIGNIFICANT CHANGE UP (ref 3.5–5.3)
RBC # BLD: 3.92 M/UL — SIGNIFICANT CHANGE UP (ref 3.8–5.2)
RBC # FLD: 16.1 % — HIGH (ref 10.3–14.5)
SODIUM SERPL-SCNC: 141 MMOL/L — SIGNIFICANT CHANGE UP (ref 135–145)
SPECIMEN SOURCE: SIGNIFICANT CHANGE UP
WBC # BLD: 5.61 K/UL — SIGNIFICANT CHANGE UP (ref 3.8–10.5)
WBC # FLD AUTO: 5.61 K/UL — SIGNIFICANT CHANGE UP (ref 3.8–10.5)

## 2022-11-08 PROCEDURE — 99221 1ST HOSP IP/OBS SF/LOW 40: CPT

## 2022-11-08 PROCEDURE — 99233 SBSQ HOSP IP/OBS HIGH 50: CPT

## 2022-11-08 RX ORDER — POVIDONE-IODINE 5 %
1 AEROSOL (ML) TOPICAL DAILY
Refills: 0 | Status: DISCONTINUED | OUTPATIENT
Start: 2022-11-08 | End: 2022-11-10

## 2022-11-08 RX ORDER — MEROPENEM 1 G/30ML
1000 INJECTION INTRAVENOUS EVERY 8 HOURS
Refills: 0 | Status: COMPLETED | OUTPATIENT
Start: 2022-11-08 | End: 2022-11-09

## 2022-11-08 RX ADMIN — MEROPENEM 100 MILLIGRAM(S): 1 INJECTION INTRAVENOUS at 22:32

## 2022-11-08 RX ADMIN — ROTIGOTINE 1 PATCH: 8 PATCH, EXTENDED RELEASE TRANSDERMAL at 19:11

## 2022-11-08 RX ADMIN — ROTIGOTINE 1 PATCH: 8 PATCH, EXTENDED RELEASE TRANSDERMAL at 16:57

## 2022-11-08 RX ADMIN — CARBIDOPA AND LEVODOPA 1 TABLET(S): 25; 100 TABLET ORAL at 22:32

## 2022-11-08 RX ADMIN — CARBIDOPA AND LEVODOPA 1 TABLET(S): 25; 100 TABLET ORAL at 05:29

## 2022-11-08 RX ADMIN — ROTIGOTINE 1 PATCH: 8 PATCH, EXTENDED RELEASE TRANSDERMAL at 16:54

## 2022-11-08 RX ADMIN — MIDODRINE HYDROCHLORIDE 5 MILLIGRAM(S): 2.5 TABLET ORAL at 08:06

## 2022-11-08 RX ADMIN — CARBIDOPA AND LEVODOPA 1 TABLET(S): 25; 100 TABLET ORAL at 10:13

## 2022-11-08 RX ADMIN — Medication 2.5 MILLIGRAM(S): at 08:06

## 2022-11-08 RX ADMIN — ONDANSETRON 4 MILLIGRAM(S): 8 TABLET, FILM COATED ORAL at 05:29

## 2022-11-08 RX ADMIN — CARBIDOPA AND LEVODOPA 1 TABLET(S): 25; 100 TABLET ORAL at 18:13

## 2022-11-08 RX ADMIN — CARBIDOPA AND LEVODOPA 1 TABLET(S): 25; 100 TABLET ORAL at 16:53

## 2022-11-08 RX ADMIN — ENOXAPARIN SODIUM 40 MILLIGRAM(S): 100 INJECTION SUBCUTANEOUS at 05:29

## 2022-11-08 RX ADMIN — MEROPENEM 100 MILLIGRAM(S): 1 INJECTION INTRAVENOUS at 14:51

## 2022-11-08 RX ADMIN — MIDODRINE HYDROCHLORIDE 5 MILLIGRAM(S): 2.5 TABLET ORAL at 16:53

## 2022-11-08 RX ADMIN — Medication 1 APPLICATION(S): at 18:14

## 2022-11-08 RX ADMIN — Medication 75 MILLIGRAM(S): at 18:13

## 2022-11-08 RX ADMIN — CARBIDOPA AND LEVODOPA 1 TABLET(S): 25; 100 TABLET ORAL at 01:10

## 2022-11-08 RX ADMIN — ONDANSETRON 4 MILLIGRAM(S): 8 TABLET, FILM COATED ORAL at 18:13

## 2022-11-08 RX ADMIN — Medication 75 MILLIGRAM(S): at 05:30

## 2022-11-08 RX ADMIN — CARBIDOPA AND LEVODOPA 1 TABLET(S): 25; 100 TABLET ORAL at 08:06

## 2022-11-08 RX ADMIN — Medication 1 APPLICATION(S): at 05:30

## 2022-11-08 RX ADMIN — CARBIDOPA AND LEVODOPA 1 TABLET(S): 25; 100 TABLET ORAL at 20:18

## 2022-11-08 RX ADMIN — ROTIGOTINE 1 PATCH: 8 PATCH, EXTENDED RELEASE TRANSDERMAL at 07:41

## 2022-11-08 NOTE — PROGRESS NOTE ADULT - SUBJECTIVE AND OBJECTIVE BOX
[facility]  Nahid Shafer PA-C  [address] [place] [address]  [phone]      INTERVAL HPI/OVERNIGHT EVENTS:  Pt s/e  Reports no GI complaints  Tolerating feeds  +BM    MEDICATIONS  (STANDING):  baclofen 2.5 milliGRAM(s) Oral <User Schedule>  carbidopa/levodopa  25/100 1 Tablet(s) Oral <User Schedule>  Dakins Solution - 1/4 Strength 1 Application(s) Topical two times a day  enoxaparin Injectable 40 milliGRAM(s) SubCutaneous every 24 hours  famotidine    Tablet 20 milliGRAM(s) Oral daily  influenza  Vaccine (HIGH DOSE) 0.7 milliLiter(s) IntraMuscular once  meropenem  IVPB 1000 milliGRAM(s) IV Intermittent every 8 hours  midodrine. 5 milliGRAM(s) Oral <User Schedule>  mirtazapine 15 milliGRAM(s) Oral daily  multivitamin/minerals 1 Tablet(s) Oral daily  ondansetron    Tablet 4 milliGRAM(s) Oral two times a day  oseltamivir 75 milliGRAM(s) Oral two times a day  polyethylene glycol 3350 17 Gram(s) Oral daily  potassium chloride  10 mEq/100 mL IVPB 10 milliEquivalent(s) IV Intermittent every 1 hour  rotigotine patch 4 mG/24 Hr(s) 1 Patch Transdermal every 24 hours  sodium chloride 0.45% with potassium chloride 20 mEq/L 1000 milliLiter(s) (75 mL/Hr) IV Continuous <Continuous>    MEDICATIONS  (PRN):  acetaminophen     Tablet .. 650 milliGRAM(s) Oral every 6 hours PRN Temp greater or equal to 38C (100.4F), Mild Pain (1 - 3)  bisacodyl Suppository 10 milliGRAM(s) Rectal daily PRN Constipation  magnesium hydroxide Suspension 30 milliLiter(s) Oral daily PRN Constipation  saline laxative (FLEET) Rectal Enema 1 Enema Rectal once PRN Constipation      Allergies    Ceclor (Rash)  IV Contrast (Hypotension)  latex (Rash; Urticaria)      PHYSICAL EXAM:   Vital Signs:  Vital Signs Last 24 Hrs  T(C): 36.4 (08 Nov 2022 06:14), Max: 36.8 (07 Nov 2022 13:14)  T(F): 97.5 (08 Nov 2022 06:14), Max: 98.2 (07 Nov 2022 13:14)  HR: 87 (08 Nov 2022 08:01) (81 - 87)  BP: 115/69 (08 Nov 2022 08:01) (93/62 - 119/71)  BP(mean): --  RR: 18 (08 Nov 2022 06:14) (18 - 18)  SpO2: 97% (08 Nov 2022 06:14) (92% - 97%)    Parameters below as of 08 Nov 2022 06:14  Patient On (Oxygen Delivery Method): nasal cannula  O2 Flow (L/min): 2    GENERAL:  Appears stated age  ABDOMEN:  Soft, non-tender, non-distended  NEURO:  Alert    LABS:                        10.1   5.61  )-----------( 349      ( 08 Nov 2022 08:48 )             32.5     11-08    141  |  108  |  12  ----------------------------<  145<H>  4.1   |  25  |  0.27<L>    Ca    8.7      08 Nov 2022 08:48  Mg     1.9     11-07

## 2022-11-08 NOTE — PROGRESS NOTE ADULT - ASSESSMENT
72 year old female from [facility] with PMH of Parkinson's, constipation, depression, GERD, arthritis, h/o colon resection for prolapsed rectum and ventral hernia repair with mesh (, Dr. Ferreira), ex lap with ANNI for SBO (2022, Dr. Nuñez), L THR (),  (), R ovarian cystectomy (), bedbound with feeding PEG tube (placed 2022, [facility]), and GJ-tube for carbidopa/levodopa pump (placed  by Dr. Oh), with broken GJ tube in L abdomen. VS stable. Does not appear to be clinically or radiographically obstructed at this time. Patient with no complaints this AM.    - Currently NPO w/ TFs  - Monitor progression of GJ tube  - Is & Os  - supportive care  - Follow up AM labs  - Case discussed with Dr. Saeed

## 2022-11-08 NOTE — PROGRESS NOTE ADULT - SUBJECTIVE AND OBJECTIVE BOX
CHIEF COMPLAINT/INTERVAL HISTORY:  Pt. seen and evaluated for dislodged GJ tube and influenza infection.  Pt. is in no distress.  Denies havijng N/V or abdominal pain.  Tolerating tubefeeds.  Tolerating IV antibiotic and tamiflu.     REVIEW OF SYSTEMS:  No fever, CP, SOB, or abdominal pain    Vital Signs Last 24 Hrs  T(C): 36.4 (08 Nov 2022 06:14), Max: 36.8 (07 Nov 2022 13:14)  T(F): 97.5 (08 Nov 2022 06:14), Max: 98.2 (07 Nov 2022 13:14)  HR: 87 (08 Nov 2022 08:01) (81 - 87)  BP: 115/69 (08 Nov 2022 08:01) (93/62 - 119/71)  BP(mean): --  RR: 18 (08 Nov 2022 06:14) (18 - 18)  SpO2: 97% (08 Nov 2022 06:14) (92% - 97%)    Parameters below as of 08 Nov 2022 06:14  Patient On (Oxygen Delivery Method): nasal cannula  O2 Flow (L/min): 2      PHYSICAL EXAM:  GENERAL: NAD  HEENT: EOMI, hearing normal, conjunctiva and sclera clear  Chest: diminished BS bilaterally, no wheezing  CV: S1S2, RRR,   GI: soft, +BS, NT/ND, +Gtube, +GJ tube, improvement in contact dermatitis of lower abdomen  Musculoskeletal: no edema, contracted LE  Psychiatric: affect nL, mood nL  Skin: warm and dry    LABS:                        8.5    4.77  )-----------( 276      ( 07 Nov 2022 07:00 )             27.7     11-07    141  |  110<H>  |  14  ----------------------------<  117<H>  4.5   |  26  |  <0.20<L>    Ca    8.6      07 Nov 2022 07:00  Mg     1.9     11-07

## 2022-11-08 NOTE — CONSULT NOTE ADULT - ASSESSMENT
Patient presents with:  1. Stage 4 pressure injury sacral region  3 x 3.5 x 3.5 periwound macerated no epibole present in induration, no fluctuance, no odor:  recommendation:   - aquacel daily  -Ann external fecal  pouch   2. Right heel unstageable pressure injury 1 x 1.5 periwound intact  recommendation:   -paint with betadine daily   -Apply cair boots at all times while in bed.   -Provide skin checks and foot placement q8h.  This pressure injury is community acquired /YES  At risk for altered tissue perfusion /YES  Impaired perfusion of peripheral tissue YES  Continue  Nutrition (as tolerated)  Continue  Offloading   Continue Pericare  Care as per medicine will follow w/ you  Findings and recommendations discussed with SHIRIN Cerna  Thank you for this consult  Lo Yin NP, [facility] [phone]

## 2022-11-08 NOTE — CHART NOTE - NSCHARTNOTEFT_GEN_A_CORE
MD notified of + blood cultures from 11/4; gram + rods x1. Patient recently completed course of meropenam and aztreonam. Likely contaminate. Repeat Blood Cx ordered, f/u results.

## 2022-11-08 NOTE — PROGRESS NOTE ADULT - SUBJECTIVE AND OBJECTIVE BOX
[facility]IOUS [facility]  Cash Saini MD PhD, Mya Romero MD, Deann Zamarripa MD, Baldev Haynes MD, Chong Johns MD  and providing coverage with Lucinda Reynoso MD  Providing Infectious Disease Consultations at [facility], [facility], [facility]    Office# [phone] to schedule follow up appointments  Answering Service for urgent calls or New Consults [phone]  Cell# to text for urgent issues Cash Saiin [phone]     infectious diseases progress note:    MATEO LESLIE is a 72y y. o. Female patient    Overnight and events of the last 24hrs reviewed    Allergies    Ceclor (Rash)  IV Contrast (Hypotension)  latex (Rash; Urticaria)    Intolerances        ANTIBIOTICS/RELEVANT:  antimicrobials  meropenem  IVPB 1000 milliGRAM(s) IV Intermittent every 8 hours  oseltamivir 75 milliGRAM(s) Oral two times a day    immunologic:  influenza  Vaccine (HIGH DOSE) 0.7 milliLiter(s) IntraMuscular once    OTHER:  acetaminophen     Tablet .. 650 milliGRAM(s) Oral every 6 hours PRN  baclofen 2.5 milliGRAM(s) Oral <User Schedule>  bisacodyl Suppository 10 milliGRAM(s) Rectal daily PRN  carbidopa/levodopa  25/100 1 Tablet(s) Oral <User Schedule>  Dakins Solution - 1/4 Strength 1 Application(s) Topical two times a day  enoxaparin Injectable 40 milliGRAM(s) SubCutaneous every 24 hours  famotidine    Tablet 20 milliGRAM(s) Oral daily  magnesium hydroxide Suspension 30 milliLiter(s) Oral daily PRN  midodrine. 5 milliGRAM(s) Oral <User Schedule>  mirtazapine 15 milliGRAM(s) Oral daily  multivitamin/minerals 1 Tablet(s) Oral daily  ondansetron    Tablet 4 milliGRAM(s) Oral two times a day  polyethylene glycol 3350 17 Gram(s) Oral daily  potassium chloride  10 mEq/100 mL IVPB 10 milliEquivalent(s) IV Intermittent every 1 hour  rotigotine patch 4 mG/24 Hr(s) 1 Patch Transdermal every 24 hours  saline laxative (FLEET) Rectal Enema 1 Enema Rectal once PRN  sodium chloride 0.45% with potassium chloride 20 mEq/L 1000 milliLiter(s) IV Continuous <Continuous>      Objective:  Vital Signs Last 24 Hrs  T(C): 36.4 (08 Nov 2022 06:14), Max: 36.8 (07 Nov 2022 13:14)  T(F): 97.5 (08 Nov 2022 06:14), Max: 98.2 (07 Nov 2022 13:14)  HR: 87 (08 Nov 2022 08:01) (81 - 87)  BP: 115/69 (08 Nov 2022 08:01) (93/62 - 119/71)  BP(mean): --  RR: 18 (08 Nov 2022 06:14) (18 - 18)  SpO2: 97% (08 Nov 2022 06:14) (92% - 97%)    Parameters below as of 08 Nov 2022 06:14  Patient On (Oxygen Delivery Method): nasal cannula  O2 Flow (L/min): 2      T(C): 36.4 (11-08-22 @ 06:14), Max: 36.8 (11-07-22 @ 13:14)  T(C): 36.4 (11-08-22 @ 06:14), Max: 36.8 (11-07-22 @ 13:14)  T(C): 36.4 (11-08-22 @ 06:14), Max: 37.1 (11-05-22 @ 05:47)    PHYSICAL EXAM:  HEENT: NC atraumatic  Neck: supple  Respiratory: no accessory muscle use, breathing comfortably  Cardiovascular: distant  Gastrointestinal: normal appearing, nondistended  Extremities: no clubbing, no cyanosis,        LABS:                          8.5    4.77  )-----------( 276      ( 07 Nov 2022 07:00 )             27.7       WBC  4.77 11-07 @ 07:00  4.20 11-06 @ 08:32  3.99 11-05 @ 05:12  4.05 11-04 @ 15:25      11-07    141  |  110<H>  |  14  ----------------------------<  117<H>  4.5   |  26  |  <0.20<L>    Ca    8.6      07 Nov 2022 07:00  Mg     1.9     11-07        Creatinine, Serum: <0.20 mg/dL (11-07-22 @ 07:00)  Creatinine, Serum: <0.20 mg/dL (11-06-22 @ 08:32)  Creatinine, Serum: 0.24 mg/dL (11-05-22 @ 05:12)  Creatinine, Serum: 0.36 mg/dL (11-04-22 @ 15:25)                INFLAMMATORY MARKERS      MICROBIOLOGY:    Culture - Urine (11.04.22 @ 17:45)    Specimen Source: Clean Catch Clean Catch (Midstream)    Culture Results:   >100,000 CFU/ml Providencia stuartii        RADIOLOGY & ADDITIONAL STUDIES:

## 2022-11-08 NOTE — PROGRESS NOTE ADULT - ASSESSMENT
72y Female with PMHx of terminal Parkinson's (bedbound), constipation, depression, arthritis, hx of partial colon resection presenting with clogged PEG G-tube being admitted for disconnected GJ-tube and UTI.     Problem/Plan - 1:  ·  Problem: Encounter for gastrojejunal tube placement.   ·  Plan: Pt w/ both chronic G-tube and GJ-tube  - Presented initially with clogged G-tube which was resolved in ED, however CT noted disconnected GJ tube  - CT abd/pelvis showed G tube localized to stomach. Second G tube localized to stomach with separate catheter localized to the mid left abdomen small bowel, possibly a GJ tube that has disconnected.  - NPO with Jevity tube feeds  - Resumed tube feeds with Jevity 1.5mg 60cc/hr x14hrs (from 16:00 to 6:00) with 25mL free water flush every hour (from 16:00 to 6:00) and 200mL water bolus flush q8h  - NOTE: please use PEG tube for all meds and tube feeds. GJ tube is ONLY used for duopa - and currently NOT usable until further evaluation and workup.  - GI Dr. Stringer consulted and following  - Surgery Dr. Saeed consulted, f/u recs.     Problem/Plan - 2:  ·  Problem: Acute UTI.   ·  Plan: Pt w/ chronic jimenez, replaced in the ED  - Pt recently found to have ESBL proteus on recent admission 9/2022 and treated with ertapenem and meropenem  - CT abd/pelvis showed bilateral nonobstructive calculi, staghorn in appearance, extending from the collecting systems to the renal pelvises. No hydronephrosis or kidneys. Findings are concerning for urinary bladder cystitis/urinary tract infection. Soft tissue thickening with ulceration at the sacrococcygeal region.  - UA +nitrite, mod leuk est, pH 9.0, mod blood, many bacteria  - WBC 11.1 and Lactate 2.4 -> 1.7 on admission  - Given 1L NS bolus and 1g Aztreonam in the ED  - Cont on meropenem 1g q8h  - Tylenol 650mg PRN for fever and pain  - UCx +Providencia stuartii, BCx 1/4 bottle with corynebacterium species.  Check repeat blood cx.  - Trend WBC and monitor for fever  - ID Dr. Johns consulted, f/u recs.     Problem/Plan - 3:  ·  Problem: Influenza.   ·  Plan: Pt w/ congestive cough and noted diffuse rhonchi on physical exam, denies SOB  - RVP +influenza  - CT chest showed copious central airway secretions. Patchy left lower lobe and lingular opacities may represent combination of mucoid impaction and infection/pneumonia. Mild right basilar atelectasis. Coronary artery calcifications. Aortic valve calcifications.  - Cont Tamiflu for 5 days  - Currently satting well on 2L O2 nasal cannula  - Supplemental O2 PRN, wean and titrated as needed  - Monitor respiratory status  - Continuous pulse ox  - ID Dr. Johns consulted, f/u recs.     Problem/Plan - 4:  ·  Problem: Parkinson's disease.   ·  Plan: Terminal Parkinson's disease  - Pt is bedbound and unable to move extremities  - Continue home neupro patch  - Continue home baclofen for spasticity  - HOLD duopa for now - because this medication is administered through GJ tube which is currently not functioning - spouse has brought it from home - will need to call pharmacy to free-text enter it once GJ tube is usable again.  Started on Sinemet via PEG Q2h while awake  - Aspiration precautions  - Fall risk precautions.     Problem/Plan - 5:  ·  Problem: Constipation.   ·  Plan: Chronic constipation on many home laxatives and stool softeners  - CT abd/pelvis showed moderate stool burden in the colon and rectum.   - Miralax standing  - MOM PRN  - Bisacodyl suppository PRN  - Fleet enema PRN.     Problem/Plan - 6:  ·  Problem: Depression.   ·  Plan: Chronic  - Continue home mirtazapine.     Problem/Plan - 7:  ·  Problem: Need for prophylactic measure.   ·  Plan: DVT ppx w/ lovenox 40mg qd.             72y Female with PMHx of terminal Parkinson's (bedbound), constipation, depression, arthritis, hx of partial colon resection presenting with clogged PEG G-tube being admitted for disconnected GJ-tube and UTI.     Problem/Plan - 1:  ·  Problem: Encounter for gastrojejunal tube placement.   ·  Plan: Pt w/ both chronic G-tube and GJ-tube  - Presented initially with clogged G-tube which was resolved in ED, however CT noted disconnected GJ tube  - CT abd/pelvis showed G tube localized to stomach. Second G tube localized to stomach with separate catheter localized to the mid left abdomen small bowel, possibly a GJ tube that has disconnected.  - NPO with Jevity tube feeds  - Resumed tube feeds with Jevity 1.5mg 60cc/hr x14hrs (from 16:00 to 6:00) with 25mL free water flush every hour (from 16:00 to 6:00) and 200mL water bolus flush q8h  - NOTE: please use PEG tube for all meds and tube feeds. GJ tube is ONLY used for duopa - and currently NOT usable until further evaluation and workup.  - GI Dr. Stringer consulted and following  - Surgery Dr. Saeed consulted, f/u recs.     Problem/Plan - 2:  ·  Problem: Acute UTI related to jimenez catheter  ·  Plan: Pt w/ chronic jimenez, replaced in the ED  - Pt recently found to have ESBL proteus on recent admission 9/2022 and treated with ertapenem and meropenem  - CT abd/pelvis showed bilateral nonobstructive calculi, staghorn in appearance, extending from the collecting systems to the renal pelvises. No hydronephrosis or kidneys. Findings are concerning for urinary bladder cystitis/urinary tract infection. Soft tissue thickening with ulceration at the sacrococcygeal region.  - UA +nitrite, mod leuk est, pH 9.0, mod blood, many bacteria  - WBC 11.1 and Lactate 2.4 -> 1.7 on admission  - Given 1L NS bolus and 1g Aztreonam in the ED  - Cont on meropenem 1g q8h  - Tylenol 650mg PRN for fever and pain  - UCx +Providencia stuartii, BCx 1/4 bottle with corynebacterium species.  Check repeat blood cx.  - Trend WBC and monitor for fever  - ID Dr. Johns consulted, f/u recs.     Problem/Plan - 3:  ·  Problem: Influenza.   ·  Plan: Pt w/ congestive cough and noted diffuse rhonchi on physical exam, denies SOB  - RVP +influenza  - CT chest showed copious central airway secretions. Patchy left lower lobe and lingular opacities may represent combination of mucoid impaction and infection/pneumonia. Mild right basilar atelectasis. Coronary artery calcifications. Aortic valve calcifications.  - Cont Tamiflu for 5 days  - Currently satting well on 2L O2 nasal cannula  - Supplemental O2 PRN, wean and titrated as needed  - Monitor respiratory status  - Continuous pulse ox  - ID Dr. Johns consulted, f/u recs.     Problem/Plan - 4:  ·  Problem: Parkinson's disease.   ·  Plan: Terminal Parkinson's disease.  Functional quadraplegia.    - Pt is bedbound and unable to move extremities  - Continue home neupro patch  - Continue home baclofen for spasticity  - HOLD duopa for now - because this medication is administered through GJ tube which is currently not functioning - spouse has brought it from home - will need to call pharmacy to free-text enter it once GJ tube is usable again.  Started on Sinemet via PEG Q2h while awake  - Aspiration precautions  - Fall risk precautions.     Problem/Plan - 5:  ·  Problem: Constipation.   ·  Plan: Chronic constipation on many home laxatives and stool softeners  - CT abd/pelvis showed moderate stool burden in the colon and rectum.   - Miralax standing  - MOM PRN  - Bisacodyl suppository PRN  - Fleet enema PRN.     Problem/Plan - 6:  ·  Problem: Depression.   ·  Plan: Chronic  - Continue home mirtazapine.     Problem/Plan - 7:  ·  Problem: Need for prophylactic measure.   ·  Plan: DVT ppx w/ lovenox 40mg qd.

## 2022-11-08 NOTE — PROGRESS NOTE ADULT - ASSESSMENT
72y Female with PMHx of terminal Parkinson's (bedbound), constipation, depression, arthritis, hx of partial colon resection presenting with clogged PEG G-tube being admitted for disconnected GJ-tube and UTI.    Acute Influenza  Acute Cystitis 2/2 CAUTI  - hx of chronic jimenez, replaced in the Ed  - most recent cx ESBL proteus on recent admission 9/2022 and treated with ertapenem and meropenem  - CT abd/pelvis showed bilateral nonobstructive calculi, staghorn in appearance, extending from the collecting systems to the renal pelvises. No hydronephrosis or kidneys. Findings are concerning for urinary bladder cystitis/urinary tract infection. Soft tissue thickening with ulceration at the sacrococcygeal region.  - UA +nitrite, mod leuk est, pH 9.0, mod blood, many bacteria  - WBC 11.1 and Lactate 2.4 -> 1.7 on admission  - s/p Aztreonam in the ED  -----    tamiflu x5 days 11/4-11/8  C/w meropenem started 11/4 recs to follow based on--UCx GNR ([employer]-sens pending)      Thank you for consulting us and involving us in the management of this most interesting and challenging case.  We will follow along in the care of this patient. Please call us at [phone] or text me directly on my cell# at [phone] with any concerns.   72y Female with PMHx of terminal Parkinson's (bedbound), constipation, depression, arthritis, hx of partial colon resection presenting with clogged PEG G-tube being admitted for disconnected GJ-tube and UTI.    Acute Influenza  Acute Cystitis 2/2 CAUTI  - hx of chronic jimenez, replaced in the Ed  - most recent cx ESBL proteus on recent admission 9/2022 and treated with ertapenem and meropenem  - CT abd/pelvis showed bilateral nonobstructive calculi, staghorn in appearance, extending from the collecting systems to the renal pelvises. No hydronephrosis or kidneys. Findings are concerning for urinary bladder cystitis/urinary tract infection. Soft tissue thickening with ulceration at the sacrococcygeal region.  - UA +nitrite, mod leuk est, pH 9.0, mod blood, many bacteria  - WBC 11.1 and Lactate 2.4 -> 1.7 on admission  - s/p Aztreonam in the ED  -----    tamiflu x5 days 11/4-11/8  C/w meropenem started 11/4 recs to follow based on--UCx GNR ([employer]-sens pending but prior infection was a proteus ESBL)      Thank you for consulting us and involving us in the management of this most interesting and challenging case.  We will follow along in the care of this patient. Please call us at [phone] or text me directly on my cell# at [phone] with any concerns.

## 2022-11-08 NOTE — CONSULT NOTE ADULT - SUBJECTIVE AND OBJECTIVE BOX
HPI:  The patient is a 72y Female with PMHx of terminal Parkinson's (bedbound), constipation, depression, arthritis, hx of partial colon resection presenting with clogged PEG tube. Patient resides at [facility] and was initally sent in for evaluation of clogged G-tube. Patient is unable to provide much information so history retrieved primarily from chart and  at bedside.  was called this morning for clogged G-tube. In the ED, GI was consulted and was able to fix the G-tube without issues. On repeat CT scan - it was shown that pt had a possible disconnected GJ tube. Patient herself is currently not complaining of any pain or discomfort although noted to have a congested cough. Patient presents with Stage 4 pressure injury sacral region  3 x 3.5 x 3.5 periwound macerated no epibole present in induration, no fluctuance, no odor: right heel unstageable pressure injury 1 x 1.5 periwound intact        PAST MEDICAL & SURGICAL HISTORY:  Constipation      Unilateral primary osteoarthritis, left hip      Parkinson;s disease      Basal cell carcinoma  scalp      GERD (gastroesophageal reflux disease)      Seasonal allergies      Depression      Acute respiratory failure with hypoxia      S/P deep brain stimulator placement  2006, 2 brain pacemakers Model #02345, batteries replaced 2014      History of   10/1982      H/O ovarian cystectomy  right,       H/O colonoscopy  , 2008 polypectomy, ,       Basal cell carcinoma  removal, mid vertex scalp, 2002      Abdominal hernia  with mesh, 2003    REVIEW OF SYSTEMS  Patient is unable to provide any information/ROS  due to baseline mental status      MEDICATIONS  (STANDING):  baclofen 2.5 milliGRAM(s) Oral <User Schedule>  carbidopa/levodopa  25/100 1 Tablet(s) Oral <User Schedule>  Dakins Solution - / Strength 1 Application(s) Topical two times a day  enoxaparin Injectable 40 milliGRAM(s) SubCutaneous every 24 hours  famotidine    Tablet 20 milliGRAM(s) Oral daily  influenza  Vaccine (HIGH DOSE) 0.7 milliLiter(s) IntraMuscular once  meropenem  IVPB 1000 milliGRAM(s) IV Intermittent every 8 hours  midodrine. 5 milliGRAM(s) Oral <User Schedule>  mirtazapine 15 milliGRAM(s) Oral daily  multivitamin/minerals 1 Tablet(s) Oral daily  ondansetron    Tablet 4 milliGRAM(s) Oral two times a day  oseltamivir 75 milliGRAM(s) Oral two times a day  polyethylene glycol 3350 17 Gram(s) Oral daily  potassium chloride  10 mEq/100 mL IVPB 10 milliEquivalent(s) IV Intermittent every 1 hour  povidone iodine 10% Solution 1 Application(s) Topical daily  rotigotine patch 4 mG/24 Hr(s) 1 Patch Transdermal every 24 hours  sodium chloride 0.45% with potassium chloride 20 mEq/L 1000 milliLiter(s) (75 mL/Hr) IV Continuous <Continuous>    MEDICATIONS  (PRN):  acetaminophen     Tablet .. 650 milliGRAM(s) Oral every 6 hours PRN Temp greater or equal to 38C (100.4F), Mild Pain (1 - 3)  bisacodyl Suppository 10 milliGRAM(s) Rectal daily PRN Constipation  magnesium hydroxide Suspension 30 milliLiter(s) Oral daily PRN Constipation  saline laxative (FLEET) Rectal Enema 1 Enema Rectal once PRN Constipation      Allergies    Ceclor (Rash)  IV Contrast (Hypotension)  latex (Rash; Urticaria)    Intolerances        SOCIAL HISTORY:  / live in [facility]    FAMILY HISTORY:  No pertinent family history in first degree relatives        Vital Signs Last 24 Hrs  T(C): 36.4 (2022 12:01), Max: 36.8 (2022 13:14)  T(F): 97.6 (2022 12:01), Max: 98.2 (2022 13:14)  HR: 88 (2022 12:01) (81 - 88)  BP: 100/67 (2022 12:01) (93/62 - 119/71)  BP(mean): --  RR: 17 (2022 12:01) (17 - 18)  SpO2: 98% (2022 12:01) (92% - 98%)    Parameters below as of 2022 12:01  Patient On (Oxygen Delivery Method): nasal cannula                            10.1   5.61  )-----------( 349      ( 2022 08:48 )             32.5     11-08    141  |  108  |  12  ----------------------------<  145<H>  4.1   |  25  |  0.27<L>    Ca    8.7      2022 08:48  Mg     1.9           Auto Neutrophil #: 3.50 K/uL (22 @ 08:48)    A1C with Estimated Average Glucose Result: 5.6 % (22 @ 00:07)      PHYSICAL EXAM:    General: resting comfortably in bed; NAD  ENT: no nasal discharge  Neck: Supple  Extremities: no gross deformities,  Dermatologic: Stage 4 pressure injury sacral region  3 x 3.5 x 3.5 periwound macerated no epibole present in induration, no fluctuance, no odor: right heel unstageable pressure injury 1 x 1.5 periwound intact  Neurologic:  Can not follow commands  Musculoskeletal/Vascular: Foot drop right foot

## 2022-11-08 NOTE — CONSULT NOTE ADULT - NSCONSULTADDITIONALINFOA_GEN_ALL_CORE
Diet, NPO with Tube Feed:   Tube Feeding Modality: Gastrostomy  Jevity 1.5  Total Volume for 24 Hours (mL): 1080  Continuous  Until Goal Tube Feed Rate (mL per Hour): 60  Tube Feed Duration (in Hours): 18  Tube Feed Start Time: 16:00  Free Water Flush  Bolus   Total Volume per Flush (mL): 200   Frequency: Every 8 Hours  Pump   Rate (mL per Hour): 25   Frequency: Every Hour    Duration (Hours): 18 (11-07-22 @ 13:40) [Active]

## 2022-11-08 NOTE — PROGRESS NOTE ADULT - SUBJECTIVE AND OBJECTIVE BOX
HPI/OVERNIGHT EVENTS:  NAEO. Patient with no complaints.    MEDICATIONS  (STANDING):  baclofen 2.5 milliGRAM(s) Oral <User Schedule>  carbidopa/levodopa  25/100 1 Tablet(s) Oral <User Schedule>  Dakins Solution - 1/4 Strength 1 Application(s) Topical two times a day  enoxaparin Injectable 40 milliGRAM(s) SubCutaneous every 24 hours  famotidine    Tablet 20 milliGRAM(s) Oral daily  influenza  Vaccine (HIGH DOSE) 0.7 milliLiter(s) IntraMuscular once  meropenem  IVPB 1000 milliGRAM(s) IV Intermittent every 8 hours  midodrine. 5 milliGRAM(s) Oral <User Schedule>  mirtazapine 15 milliGRAM(s) Oral daily  multivitamin/minerals 1 Tablet(s) Oral daily  ondansetron    Tablet 4 milliGRAM(s) Oral two times a day  oseltamivir 75 milliGRAM(s) Oral two times a day  polyethylene glycol 3350 17 Gram(s) Oral daily  potassium chloride  10 mEq/100 mL IVPB 10 milliEquivalent(s) IV Intermittent every 1 hour  rotigotine patch 4 mG/24 Hr(s) 1 Patch Transdermal every 24 hours  sodium chloride 0.45% with potassium chloride 20 mEq/L 1000 milliLiter(s) (75 mL/Hr) IV Continuous <Continuous>    MEDICATIONS  (PRN):  acetaminophen     Tablet .. 650 milliGRAM(s) Oral every 6 hours PRN Temp greater or equal to 38C (100.4F), Mild Pain (1 - 3)  bisacodyl Suppository 10 milliGRAM(s) Rectal daily PRN Constipation  magnesium hydroxide Suspension 30 milliLiter(s) Oral daily PRN Constipation  saline laxative (FLEET) Rectal Enema 1 Enema Rectal once PRN Constipation      Vital Signs Last 24 Hrs  T(C): 36.4 (08 Nov 2022 06:14), Max: 36.8 (07 Nov 2022 13:14)  T(F): 97.5 (08 Nov 2022 06:14), Max: 98.2 (07 Nov 2022 13:14)  HR: 87 (08 Nov 2022 08:01) (81 - 87)  BP: 115/69 (08 Nov 2022 08:01) (93/62 - 119/71)  BP(mean): --  RR: 18 (08 Nov 2022 06:14) (18 - 18)  SpO2: 97% (08 Nov 2022 06:14) (92% - 97%)    Parameters below as of 08 Nov 2022 06:14  Patient On (Oxygen Delivery Method): nasal cannula  O2 Flow (L/min): 2      Constitutional: patient resting comfortably in bed, in no acute distress  HEENT: EOMI, atraumatic  Respiratory: respirations are unlabored, no accessory muscle use, no conversational dyspnea  Gastrointestinal: Abdomen soft, non-tender, non-distended, g-tube in place with TF running  Neurological: A&O x0    I&O's Detail    07 Nov 2022 07:01  -  08 Nov 2022 07:00  --------------------------------------------------------  IN:  Total IN: 0 mL    OUT:  Voided (mL): 350 mL  Total OUT: 350 mL  Total NET: -350 mL        LABS:                        8.5    4.77  )-----------( 276      ( 07 Nov 2022 07:00 )             27.7     11-07    141  |  110<H>  |  14  ----------------------------<  117<H>  4.5   |  26  |  <0.20<L>    Ca    8.6      07 Nov 2022 07:00  Mg     1.9     11-07

## 2022-11-08 NOTE — PROGRESS NOTE ADULT - NUTRITIONAL ASSESSMENT
This patient has been assessed with a concern for Malnutrition and has been determined to have a diagnosis/diagnoses of Mild protein-calorie malnutrition.    This patient is being managed with:   Diet NPO with Tube Feed-  Tube Feeding Modality: Gastrostomy  Jevity 1.5  Total Volume for 24 Hours (mL): 1080  Continuous  Until Goal Tube Feed Rate (mL per Hour): 60  Tube Feed Duration (in Hours): 18  Tube Feed Start Time: 16:00  Free Water Flush  Bolus   Total Volume per Flush (mL): 200   Frequency: Every 8 Hours  Pump   Rate (mL per Hour): 25   Frequency: Every Hour    Duration (Hours): 18  Entered: Nov 7 2022  1:40PM

## 2022-11-09 LAB
-  AMIKACIN: SIGNIFICANT CHANGE UP
-  AMOXICILLIN/CLAVULANIC ACID: SIGNIFICANT CHANGE UP
-  AMPICILLIN/SULBACTAM: SIGNIFICANT CHANGE UP
-  AMPICILLIN: SIGNIFICANT CHANGE UP
-  AZTREONAM: SIGNIFICANT CHANGE UP
-  CEFAZOLIN: SIGNIFICANT CHANGE UP
-  CEFEPIME: SIGNIFICANT CHANGE UP
-  CEFOXITIN: SIGNIFICANT CHANGE UP
-  CEFTRIAXONE: SIGNIFICANT CHANGE UP
-  CIPROFLOXACIN: SIGNIFICANT CHANGE UP
-  ERTAPENEM: SIGNIFICANT CHANGE UP
-  GENTAMICIN: SIGNIFICANT CHANGE UP
-  IMIPENEM: SIGNIFICANT CHANGE UP
-  LEVOFLOXACIN: SIGNIFICANT CHANGE UP
-  MEROPENEM: SIGNIFICANT CHANGE UP
-  NITROFURANTOIN: SIGNIFICANT CHANGE UP
-  PIPERACILLIN/TAZOBACTAM: SIGNIFICANT CHANGE UP
-  TOBRAMYCIN: SIGNIFICANT CHANGE UP
-  TRIMETHOPRIM/SULFAMETHOXAZOLE: SIGNIFICANT CHANGE UP
ANION GAP SERPL CALC-SCNC: 4 MMOL/L — LOW (ref 5–17)
BASOPHILS # BLD AUTO: 0.01 K/UL — SIGNIFICANT CHANGE UP (ref 0–0.2)
BASOPHILS NFR BLD AUTO: 0.2 % — SIGNIFICANT CHANGE UP (ref 0–2)
BUN SERPL-MCNC: 11 MG/DL — SIGNIFICANT CHANGE UP (ref 7–23)
CALCIUM SERPL-MCNC: 8.7 MG/DL — SIGNIFICANT CHANGE UP (ref 8.5–10.1)
CHLORIDE SERPL-SCNC: 107 MMOL/L — SIGNIFICANT CHANGE UP (ref 96–108)
CO2 SERPL-SCNC: 31 MMOL/L — SIGNIFICANT CHANGE UP (ref 22–31)
CREAT SERPL-MCNC: 0.26 MG/DL — LOW (ref 0.5–1.3)
CULTURE RESULTS: SIGNIFICANT CHANGE UP
EGFR: 115 ML/MIN/1.73M2 — SIGNIFICANT CHANGE UP
EOSINOPHIL # BLD AUTO: 0.3 K/UL — SIGNIFICANT CHANGE UP (ref 0–0.5)
EOSINOPHIL NFR BLD AUTO: 4.7 % — SIGNIFICANT CHANGE UP (ref 0–6)
GLUCOSE SERPL-MCNC: 125 MG/DL — HIGH (ref 70–99)
HCT VFR BLD CALC: 32 % — LOW (ref 34.5–45)
HGB BLD-MCNC: 9.8 G/DL — LOW (ref 11.5–15.5)
IMM GRANULOCYTES NFR BLD AUTO: 0.5 % — SIGNIFICANT CHANGE UP (ref 0–0.9)
LYMPHOCYTES # BLD AUTO: 1.29 K/UL — SIGNIFICANT CHANGE UP (ref 1–3.3)
LYMPHOCYTES # BLD AUTO: 20.3 % — SIGNIFICANT CHANGE UP (ref 13–44)
MCHC RBC-ENTMCNC: 25.7 PG — LOW (ref 27–34)
MCHC RBC-ENTMCNC: 30.6 GM/DL — LOW (ref 32–36)
MCV RBC AUTO: 84 FL — SIGNIFICANT CHANGE UP (ref 80–100)
METHOD TYPE: SIGNIFICANT CHANGE UP
MONOCYTES # BLD AUTO: 0.29 K/UL — SIGNIFICANT CHANGE UP (ref 0–0.9)
MONOCYTES NFR BLD AUTO: 4.6 % — SIGNIFICANT CHANGE UP (ref 2–14)
NEUTROPHILS # BLD AUTO: 4.42 K/UL — SIGNIFICANT CHANGE UP (ref 1.8–7.4)
NEUTROPHILS NFR BLD AUTO: 69.7 % — SIGNIFICANT CHANGE UP (ref 43–77)
NRBC # BLD: 0 /100 WBCS — SIGNIFICANT CHANGE UP (ref 0–0)
ORGANISM # SPEC MICROSCOPIC CNT: SIGNIFICANT CHANGE UP
PLATELET # BLD AUTO: 346 K/UL — SIGNIFICANT CHANGE UP (ref 150–400)
POTASSIUM SERPL-MCNC: 3.9 MMOL/L — SIGNIFICANT CHANGE UP (ref 3.5–5.3)
POTASSIUM SERPL-SCNC: 3.9 MMOL/L — SIGNIFICANT CHANGE UP (ref 3.5–5.3)
RBC # BLD: 3.81 M/UL — SIGNIFICANT CHANGE UP (ref 3.8–5.2)
RBC # FLD: 16.2 % — HIGH (ref 10.3–14.5)
SODIUM SERPL-SCNC: 142 MMOL/L — SIGNIFICANT CHANGE UP (ref 135–145)
SPECIMEN SOURCE: SIGNIFICANT CHANGE UP
WBC # BLD: 6.34 K/UL — SIGNIFICANT CHANGE UP (ref 3.8–10.5)
WBC # FLD AUTO: 6.34 K/UL — SIGNIFICANT CHANGE UP (ref 3.8–10.5)

## 2022-11-09 PROCEDURE — 99233 SBSQ HOSP IP/OBS HIGH 50: CPT

## 2022-11-09 PROCEDURE — 99231 SBSQ HOSP IP/OBS SF/LOW 25: CPT

## 2022-11-09 RX ORDER — CLOTRIMAZOLE AND BETAMETHASONE DIPROPIONATE 10; .5 MG/G; MG/G
1 CREAM TOPICAL
Refills: 0 | Status: DISCONTINUED | OUTPATIENT
Start: 2022-11-09 | End: 2022-11-10

## 2022-11-09 RX ADMIN — Medication 1 APPLICATION(S): at 12:34

## 2022-11-09 RX ADMIN — FAMOTIDINE 20 MILLIGRAM(S): 10 INJECTION INTRAVENOUS at 12:34

## 2022-11-09 RX ADMIN — MIRTAZAPINE 15 MILLIGRAM(S): 45 TABLET, ORALLY DISINTEGRATING ORAL at 12:34

## 2022-11-09 RX ADMIN — CARBIDOPA AND LEVODOPA 1 TABLET(S): 25; 100 TABLET ORAL at 06:01

## 2022-11-09 RX ADMIN — CARBIDOPA AND LEVODOPA 1 TABLET(S): 25; 100 TABLET ORAL at 00:36

## 2022-11-09 RX ADMIN — Medication 75 MILLIGRAM(S): at 18:26

## 2022-11-09 RX ADMIN — Medication 75 MILLIGRAM(S): at 06:02

## 2022-11-09 RX ADMIN — CARBIDOPA AND LEVODOPA 1 TABLET(S): 25; 100 TABLET ORAL at 20:25

## 2022-11-09 RX ADMIN — ONDANSETRON 4 MILLIGRAM(S): 8 TABLET, FILM COATED ORAL at 06:01

## 2022-11-09 RX ADMIN — POLYETHYLENE GLYCOL 3350 17 GRAM(S): 17 POWDER, FOR SOLUTION ORAL at 12:34

## 2022-11-09 RX ADMIN — ONDANSETRON 4 MILLIGRAM(S): 8 TABLET, FILM COATED ORAL at 18:26

## 2022-11-09 RX ADMIN — ROTIGOTINE 1 PATCH: 8 PATCH, EXTENDED RELEASE TRANSDERMAL at 07:20

## 2022-11-09 RX ADMIN — ENOXAPARIN SODIUM 40 MILLIGRAM(S): 100 INJECTION SUBCUTANEOUS at 06:01

## 2022-11-09 RX ADMIN — CARBIDOPA AND LEVODOPA 1 TABLET(S): 25; 100 TABLET ORAL at 18:26

## 2022-11-09 RX ADMIN — MIDODRINE HYDROCHLORIDE 5 MILLIGRAM(S): 2.5 TABLET ORAL at 16:30

## 2022-11-09 RX ADMIN — Medication 2.5 MILLIGRAM(S): at 08:14

## 2022-11-09 RX ADMIN — ROTIGOTINE 1 PATCH: 8 PATCH, EXTENDED RELEASE TRANSDERMAL at 19:37

## 2022-11-09 RX ADMIN — CARBIDOPA AND LEVODOPA 1 TABLET(S): 25; 100 TABLET ORAL at 08:14

## 2022-11-09 RX ADMIN — Medication 1 APPLICATION(S): at 18:27

## 2022-11-09 RX ADMIN — CARBIDOPA AND LEVODOPA 1 TABLET(S): 25; 100 TABLET ORAL at 16:30

## 2022-11-09 RX ADMIN — CARBIDOPA AND LEVODOPA 1 TABLET(S): 25; 100 TABLET ORAL at 10:14

## 2022-11-09 RX ADMIN — MIDODRINE HYDROCHLORIDE 5 MILLIGRAM(S): 2.5 TABLET ORAL at 08:14

## 2022-11-09 RX ADMIN — ROTIGOTINE 1 PATCH: 8 PATCH, EXTENDED RELEASE TRANSDERMAL at 16:35

## 2022-11-09 RX ADMIN — Medication 1 TABLET(S): at 12:34

## 2022-11-09 RX ADMIN — CARBIDOPA AND LEVODOPA 1 TABLET(S): 25; 100 TABLET ORAL at 14:59

## 2022-11-09 RX ADMIN — CARBIDOPA AND LEVODOPA 1 TABLET(S): 25; 100 TABLET ORAL at 12:33

## 2022-11-09 RX ADMIN — CLOTRIMAZOLE AND BETAMETHASONE DIPROPIONATE 1 APPLICATION(S): 10; .5 CREAM TOPICAL at 22:09

## 2022-11-09 RX ADMIN — CARBIDOPA AND LEVODOPA 1 TABLET(S): 25; 100 TABLET ORAL at 22:09

## 2022-11-09 RX ADMIN — Medication 2.5 MILLIGRAM(S): at 14:58

## 2022-11-09 RX ADMIN — ROTIGOTINE 1 PATCH: 8 PATCH, EXTENDED RELEASE TRANSDERMAL at 16:00

## 2022-11-09 RX ADMIN — Medication 1 APPLICATION(S): at 06:02

## 2022-11-09 RX ADMIN — MEROPENEM 100 MILLIGRAM(S): 1 INJECTION INTRAVENOUS at 06:02

## 2022-11-09 NOTE — PROVIDER CONTACT NOTE (CRITICAL VALUE NOTIFICATION) - TEST AND RESULT REPORTED:
Positive urine culture 11/4 >100,000 [facility] >100,000 ecoli ESBL in the urine
Blood culture 11/4/22 Growth in aerobic bottle: Gram positive Rods.
LACTATE 2.4

## 2022-11-09 NOTE — PROGRESS NOTE ADULT - ASSESSMENT
72y Female with PMHx of terminal Parkinson's (bedbound), constipation, depression, arthritis, hx of partial colon resection presenting with clogged PEG G-tube being admitted for disconnected GJ-tube and UTI.    Acute Influenza  Acute Cystitis 2/2 CAUTI  - hx of chronic jimenez, replaced in the Ed  - most recent cx ESBL proteus on recent admission 9/2022 and treated with ertapenem and meropenem  - CT abd/pelvis showed bilateral nonobstructive calculi, staghorn in appearance, extending from the collecting systems to the renal pelvises. No hydronephrosis or kidneys. Findings are concerning for urinary bladder cystitis/urinary tract infection. Soft tissue thickening with ulceration at the sacrococcygeal region.  - UA +nitrite, mod leuk est, pH 9.0, mod blood, many bacteria  - WBC 11.1 and Lactate 2.4 -> 1.7 on admission  - s/p Aztreonam in the ED  -----    tamiflu x5 days 11/4-11/9-will stop after today  sp meropenem started 11/4 x 3 days-UCx >100,000 CFU/ml Providencia stuartii >100,000 CFU/ml Escherichia coli ESBL    Thank you for consulting us and involving us in the management of this most interesting and challenging case.  We will follow along in the care of this patient. Please call us at [phone] or text me directly on my cell# at [phone] with any concerns.

## 2022-11-09 NOTE — PROGRESS NOTE ADULT - SUBJECTIVE AND OBJECTIVE BOX
[facility]  Nahid Shafer PA-C  [address] [place] [address]  [phone]      INTERVAL HPI/OVERNIGHT EVENTS:  Pt s/e  Overnight events noted  +peg and GJ tube  Reports no GI complaints    MEDICATIONS  (STANDING):  baclofen 2.5 milliGRAM(s) Oral <User Schedule>  carbidopa/levodopa  25/100 1 Tablet(s) Oral <User Schedule>  Dakins Solution - 1/4 Strength 1 Application(s) Topical two times a day  enoxaparin Injectable 40 milliGRAM(s) SubCutaneous every 24 hours  famotidine    Tablet 20 milliGRAM(s) Oral daily  influenza  Vaccine (HIGH DOSE) 0.7 milliLiter(s) IntraMuscular once  midodrine. 5 milliGRAM(s) Oral <User Schedule>  mirtazapine 15 milliGRAM(s) Oral daily  multivitamin/minerals 1 Tablet(s) Oral daily  ondansetron    Tablet 4 milliGRAM(s) Oral two times a day  oseltamivir 75 milliGRAM(s) Oral two times a day  polyethylene glycol 3350 17 Gram(s) Oral daily  potassium chloride  10 mEq/100 mL IVPB 10 milliEquivalent(s) IV Intermittent every 1 hour  povidone iodine 10% Solution 1 Application(s) Topical daily  rotigotine patch 4 mG/24 Hr(s) 1 Patch Transdermal every 24 hours  sodium chloride 0.45% with potassium chloride 20 mEq/L 1000 milliLiter(s) (75 mL/Hr) IV Continuous <Continuous>    MEDICATIONS  (PRN):  acetaminophen     Tablet .. 650 milliGRAM(s) Oral every 6 hours PRN Temp greater or equal to 38C (100.4F), Mild Pain (1 - 3)  bisacodyl Suppository 10 milliGRAM(s) Rectal daily PRN Constipation  magnesium hydroxide Suspension 30 milliLiter(s) Oral daily PRN Constipation  saline laxative (FLEET) Rectal Enema 1 Enema Rectal once PRN Constipation      Allergies    Ceclor (Rash)  IV Contrast (Hypotension)  latex (Rash; Urticaria)      PHYSICAL EXAM:   Vital Signs:  Vital Signs Last 24 Hrs  T(C): 37.2 (2022 05:10), Max: 37.2 (2022 05:10)  T(F): 98.9 (2022 05:10), Max: 98.9 (2022 05:10)  HR: 75 (2022 05:10) (75 - 88)  BP: 108/74 (2022 05:10) (100/62 - 108/74)  BP(mean): --  RR: 19 (2022 05:10) (17 - 19)  SpO2: 99% (2022 05:10) (98% - 99%)    Parameters below as of 2022 05:10  Patient On (Oxygen Delivery Method): nasal cannula  O2 Flow (L/min): 2    Daily     Daily Weight in k.4 (2022 05:10)    GENERAL:  Appears stated age  ABDOMEN:  Soft, non-tender, non-distended  NEURO:  Alert      LABS:                        9.8    6.34  )-----------( 346      ( 2022 06:40 )             32.0     11-    142  |  107  |  11  ----------------------------<  125<H>  3.9   |  31  |  0.26<L>    Ca    8.7      2022 06:40

## 2022-11-09 NOTE — PROGRESS NOTE ADULT - ASSESSMENT
72y Female with PMHx of terminal Parkinson's (bedbound), constipation, depression, arthritis, hx of partial colon resection presenting with clogged PEG G-tube being admitted for disconnected GJ-tube and UTI.     Problem/Plan - 1:  ·  Problem: Encounter for gastrojejunal tube placement.   ·  Plan: Pt w/ both chronic G-tube and GJ-tube  - Presented initially with clogged G-tube which was resolved in ED, however CT noted disconnected GJ tube  - CT abd/pelvis showed G tube localized to stomach. Second G tube localized to stomach with separate catheter localized to the mid left abdomen small bowel, possibly a GJ tube that has disconnected.  - NPO with Jevity tube feeds  - Resumed tube feeds        Problem/Plan - 2:  ·  Problem: Acute UTI related to jimenez catheter  ·  Plan: Pt w/ chronic jimenez, replaced in the ED  - Pt recently found to have ESBL proteus on recent admission 9/2022 and treated with ertapenem and meropenem  - CT abd/pelvis showed bilateral nonobstructive calculi, staghorn in appearance, extending from the collecting systems to the renal pelvises. No hydronephrosis or kidneys. Findings are concerning for urinary bladder cystitis/urinary tract infection. Soft tissue thickening with ulceration at the sacrococcygeal region.   ID consult appreciated, on bactrim       Problem/Plan - 3:  ·  Problem: Influenza.   ·  Plan: Pt w/ congestive cough and noted diffuse rhonchi on physical exam, denies SOB  - RVP +influenza  - CT chest showed copious central airway secretions. Patchy left lower lobe and lingular opacities may represent combination of mucoid impaction and infection/pneumonia. Mild right basilar atelectasis. Coronary artery calcifications. Aortic valve calcifications.  - Cont Tamiflu for 5 days  O2 supply      Problem/Plan - 4:  ·  Problem: Parkinson's disease.   ·  Plan: Terminal Parkinson's disease.  Functional quadraplegia.    - Pt is bedbound and unable to move extremities  - Continue home neupro patch  - Continue home baclofen for spasticity  - HOLD duopa for now - because this medication is administered through GJ tube which is currently not functioning - spouse has brought it from home - will need to call pharmacy to free-text enter it once GJ tube is usable again.  Started on Sinemet via PEG Q2h while awake  - Aspiration precautions  - Fall risk precautions.     Problem/Plan - 5:  ·  Problem: Constipation.   ·  Plan: Chronic constipation on many home laxatives and stool softeners  - CT abd/pelvis showed moderate stool burden in the colon and rectum.   - Miralax standing  - Bisacodyl suppository PRN  - Fleet enema PRN.     Problem/Plan - 6:  ·  Problem: Depression.   ·  Plan: Chronic  - Continue home mirtazapine.     Problem/Plan - 7:  ·  Problem: Need for prophylactic measure.   ·  Plan: DVT ppx w/ lovenox 40mg qd.

## 2022-11-09 NOTE — PROGRESS NOTE ADULT - SUBJECTIVE AND OBJECTIVE BOX
[facility] DIVISION of INFECTIOUS DISEASE  Cash Saini MD PhD, Mya Romero MD, Deann Zamarripa MD, Baldev Haynes MD, Chong Johns MD  and providing coverage with Lucinad Reynoso MD  Providing Infectious Disease Consultations at [facility], [facility], [facility]'s    Office# [phone] to schedule follow up appointments  Answering Service for urgent calls or New Consults [phone]  Cell# to text for urgent issues Cash Saini [phone]     infectious diseases progress note:    MATEO LESLIE is a 72y y. o. Female patient    Overnight and events of the last 24hrs reviewed    Allergies    Ceclor (Rash)  IV Contrast (Hypotension)  latex (Rash; Urticaria)    Intolerances        ANTIBIOTICS/RELEVANT:  antimicrobials  oseltamivir 75 milliGRAM(s) Oral two times a day    immunologic:  influenza  Vaccine (HIGH DOSE) 0.7 milliLiter(s) IntraMuscular once    OTHER:  acetaminophen     Tablet .. 650 milliGRAM(s) Oral every 6 hours PRN  baclofen 2.5 milliGRAM(s) Oral <User Schedule>  bisacodyl Suppository 10 milliGRAM(s) Rectal daily PRN  carbidopa/levodopa  25/100 1 Tablet(s) Oral <User Schedule>  clotrimazole/betamethasone Cream 1 Application(s) Topical two times a day  Dakins Solution - 1/4 Strength 1 Application(s) Topical two times a day  enoxaparin Injectable 40 milliGRAM(s) SubCutaneous every 24 hours  famotidine    Tablet 20 milliGRAM(s) Oral daily  magnesium hydroxide Suspension 30 milliLiter(s) Oral daily PRN  midodrine. 5 milliGRAM(s) Oral <User Schedule>  mirtazapine 15 milliGRAM(s) Oral daily  multivitamin/minerals 1 Tablet(s) Oral daily  ondansetron    Tablet 4 milliGRAM(s) Oral two times a day  polyethylene glycol 3350 17 Gram(s) Oral daily  potassium chloride  10 mEq/100 mL IVPB 10 milliEquivalent(s) IV Intermittent every 1 hour  povidone iodine 10% Solution 1 Application(s) Topical daily  rotigotine patch 4 mG/24 Hr(s) 1 Patch Transdermal every 24 hours  saline laxative (FLEET) Rectal Enema 1 Enema Rectal once PRN  sodium chloride 0.45% with potassium chloride 20 mEq/L 1000 milliLiter(s) IV Continuous <Continuous>      Objective:  Vital Signs Last 24 Hrs  T(C): 36.8 (09 Nov 2022 12:30), Max: 37.2 (09 Nov 2022 05:10)  T(F): 98.3 (09 Nov 2022 12:30), Max: 98.9 (09 Nov 2022 05:10)  HR: 76 (09 Nov 2022 12:30) (75 - 86)  BP: 114/72 (09 Nov 2022 12:30) (100/62 - 114/72)  BP(mean): --  RR: 18 (09 Nov 2022 12:30) (18 - 19)  SpO2: 98% (09 Nov 2022 12:30) (98% - 99%)    Parameters below as of 09 Nov 2022 12:30  Patient On (Oxygen Delivery Method): nasal cannula        T(C): 36.8 (11-09-22 @ 12:30), Max: 37.2 (11-09-22 @ 05:10)  T(C): 36.8 (11-09-22 @ 12:30), Max: 37.2 (11-09-22 @ 05:10)  T(C): 36.8 (11-09-22 @ 12:30), Max: 37.2 (11-09-22 @ 05:10)    PHYSICAL EXAM:  HEENT: NC atraumatic  Neck: supple  Respiratory: no accessory muscle use, breathing comfortably  Cardiovascular: distant  Gastrointestinal: normal appearing, nondistended  Extremities: no clubbing, no cyanosis,        LABS:                          9.8    6.34  )-----------( 346      ( 09 Nov 2022 06:40 )             32.0       WBC  6.34 11-09 @ 06:40  5.61 11-08 @ 08:48  4.77 11-07 @ 07:00  4.20 11-06 @ 08:32  3.99 11-05 @ 05:12  4.05 11-04 @ 15:25      11-09    142  |  107  |  11  ----------------------------<  125<H>  3.9   |  31  |  0.26<L>    Ca    8.7      09 Nov 2022 06:40        Creatinine, Serum: 0.26 mg/dL (11-09-22 @ 06:40)  Creatinine, Serum: 0.27 mg/dL (11-08-22 @ 08:48)  Creatinine, Serum: <0.20 mg/dL (11-07-22 @ 07:00)  Creatinine, Serum: <0.20 mg/dL (11-06-22 @ 08:32)  Creatinine, Serum: 0.24 mg/dL (11-05-22 @ 05:12)  Creatinine, Serum: 0.36 mg/dL (11-04-22 @ 15:25)                INFLAMMATORY MARKERS      MICROBIOLOGY:    Culture - Urine (11.04.22 @ 17:45)    -  Tobramycin: S <=2    -  Ciprofloxacin: S <=0.25    -  Ciprofloxacin: R >2    -  Ertapenem: S <=0.5    -  Ertapenem: S <=0.5    -  Gentamicin: S <=2    -  Imipenem: S <=1    -  Levofloxacin: R >4    -  Levofloxacin: S <=0.5    -  Meropenem: S <=1    -  Meropenem: S <=1    -  Nitrofurantoin: S <=32 Should not be used to treat pyelonephritis    -  Nitrofurantoin: R >64 Should not be used to treat pyelonephritis    -  Piperacillin/Tazobactam: S <=8    -  Piperacillin/Tazobactam: S <=8    -  Trimethoprim/Sulfamethoxazole: S <=0.5/9.5    -  Trimethoprim/Sulfamethoxazole: S <=0.5/9.5    -  Amikacin: S <=16    -  Amikacin: S <=16    -  Amoxicillin/Clavulanic Acid: R >16/8    -  Amoxicillin/Clavulanic Acid: S <=8/4    -  Ampicillin: R >16 These ampicillin results predict results for amoxicillin    -  Ampicillin: R >16 These ampicillin results predict results for amoxicillin    -  Ampicillin/Sulbactam: S 8/4 Enterobacter, Klebsiella aerogenes, Citrobacter, and Serratia may develop resistance during prolonged therapy (3-4 days)    -  Ampicillin/Sulbactam: S <=4/2 Enterobacter, Klebsiella aerogenes, Citrobacter, and Serratia may develop resistance during prolonged therapy (3-4 days)    -  Aztreonam: R 8    -  Aztreonam: S <=4    -  Cefazolin: R >16    -  Cefazolin: R >16 For uncomplicated UTI with K. pneumoniae, E. coli, or P. mirablis: KEN <=16 is sensitive and KEN >=32 is resistant. This also predicts results for oral agents cefaclor, cefdinir, cefpodoxime, cefprozil, cefuroxime axetil, cephalexin and locarbef for uncomplicated UTI. Note that some isolates may be susceptible to these agents while testing resistant to cefazolin.    -  Cefepime: R >16    -  Cefepime: S <=2    -  Cefoxitin: S <=8    -  Cefoxitin: S <=8    -  Ceftriaxone: R >32 Enterobacter, Klebsiella aerogenes, Citrobacter, and Serratia may develop resistance during prolonged therapy    -  Ceftriaxone: S <=1 Enterobacter, Klebsiella aerogenes, Citrobacter, and Serratia may develop resistance during prolonged therapy    Specimen Source: Clean Catch Clean Catch (Midstream)    Culture Results:   >100,000 CFU/ml Providencia stuartii  >100,000 CFU/ml Escherichia coli ESBL    Organism Identification: Providencia stuartii  Escherichia coli ESBL    Organism: Providencia stuartii    Organism: Escherichia coli ESBL    Method Type: KEN    Method Type: KEN        RADIOLOGY & ADDITIONAL STUDIES:

## 2022-11-09 NOTE — PROGRESS NOTE ADULT - SUBJECTIVE AND OBJECTIVE BOX
Pt seen  doing well  no complaints  ICU Vital Signs Last 24 Hrs  T(C): 36.8 (09 Nov 2022 12:30), Max: 37.2 (09 Nov 2022 05:10)  T(F): 98.3 (09 Nov 2022 12:30), Max: 98.9 (09 Nov 2022 05:10)  HR: 76 (09 Nov 2022 12:30) (75 - 86)  BP: 114/72 (09 Nov 2022 12:30) (100/62 - 114/72)  BP(mean): --  ABP: --  ABP(mean): --  RR: 18 (09 Nov 2022 12:30) (18 - 19)  SpO2: 98% (09 Nov 2022 12:30) (98% - 99%)    O2 Parameters below as of 09 Nov 2022 12:30  Patient On (Oxygen Delivery Method): nasal cannula        NAD  soft NT/ND                        9.8    6.34  )-----------( 346      ( 09 Nov 2022 06:40 )             32.0

## 2022-11-09 NOTE — PROGRESS NOTE ADULT - SUBJECTIVE AND OBJECTIVE BOX
Patient is a 72y old  Female who presents with a chief complaint of GJ tube malfunction (09 Nov 2022 16:03)      Subjective:  INTERVAL HPI/OVERNIGHT EVENTS: Patient seen and examined at bedside.  patient appears weak,not be able to provide reliable history   MEDICATIONS  (STANDING):  baclofen 2.5 milliGRAM(s) Oral <User Schedule>  carbidopa/levodopa  25/100 1 Tablet(s) Oral <User Schedule>  clotrimazole/betamethasone Cream 1 Application(s) Topical two times a day  Dakins Solution - 1/4 Strength 1 Application(s) Topical two times a day  enoxaparin Injectable 40 milliGRAM(s) SubCutaneous every 24 hours  famotidine    Tablet 20 milliGRAM(s) Oral daily  influenza  Vaccine (HIGH DOSE) 0.7 milliLiter(s) IntraMuscular once  midodrine. 5 milliGRAM(s) Oral <User Schedule>  mirtazapine 15 milliGRAM(s) Oral daily  multivitamin/minerals 1 Tablet(s) Oral daily  ondansetron    Tablet 4 milliGRAM(s) Oral two times a day  oseltamivir 75 milliGRAM(s) Oral two times a day  polyethylene glycol 3350 17 Gram(s) Oral daily  potassium chloride  10 mEq/100 mL IVPB 10 milliEquivalent(s) IV Intermittent every 1 hour  povidone iodine 10% Solution 1 Application(s) Topical daily  rotigotine patch 4 mG/24 Hr(s) 1 Patch Transdermal every 24 hours  sodium chloride 0.45% with potassium chloride 20 mEq/L 1000 milliLiter(s) (75 mL/Hr) IV Continuous <Continuous>    MEDICATIONS  (PRN):  acetaminophen     Tablet .. 650 milliGRAM(s) Oral every 6 hours PRN Temp greater or equal to 38C (100.4F), Mild Pain (1 - 3)  bisacodyl Suppository 10 milliGRAM(s) Rectal daily PRN Constipation  magnesium hydroxide Suspension 30 milliLiter(s) Oral daily PRN Constipation  saline laxative (FLEET) Rectal Enema 1 Enema Rectal once PRN Constipation      Allergies    Ceclor (Rash)  IV Contrast (Hypotension)  latex (Rash; Urticaria)    Intolerances        REVIEW OF SYSTEMS:  not be able to obtain due to dementia       Objective:  Vital Signs Last 24 Hrs  T(C): 36.8 (09 Nov 2022 12:30), Max: 37.2 (09 Nov 2022 05:10)  T(F): 98.3 (09 Nov 2022 12:30), Max: 98.9 (09 Nov 2022 05:10)  HR: 76 (09 Nov 2022 12:30) (75 - 85)  BP: 114/72 (09 Nov 2022 12:30) (100/62 - 114/72)  BP(mean): --  RR: 18 (09 Nov 2022 12:30) (18 - 19)  SpO2: 98% (09 Nov 2022 12:30) (98% - 99%)    Parameters below as of 09 Nov 2022 12:30  Patient On (Oxygen Delivery Method): nasal cannula        GENERAL: NAD, lying in bed comfortably, appears weak   HEAD:  Normocephalic  EYES:  conjunctiva and sclera clear  ENT: Moist mucous membranes  NECK: Supple  CHEST/LUNG: Clear to auscultation bilaterally; No rales or rhonchi; no wheezing. Unlabored respirations  HEART: Regular rate and rhythm; S1S2+  ABDOMEN: Bowel sounds present; Soft, Nontender, Nondistended. G tube in , no leaking   EXTREMITIES:  + distal Peripheral Pulses;  No cyanosis, mild ankle edema   NERVOUS SYSTEM:  Alert & Oriented X1, doesn't follow commend   MSK: bed bound, decrease muscle strength   SKIN: + PU as prior ducumented, + diffuse erythematous rash B/L [facility], back     LABS:                        9.8    6.34  )-----------( 346      ( 09 Nov 2022 06:40 )             32.0     09 Nov 2022 06:40    142    |  107    |  11     ----------------------------<  125    3.9     |  31     |  0.26     Ca    8.7        09 Nov 2022 06:40          CAPILLARY BLOOD GLUCOSE            Culture - Blood (collected 11-08-22 @ 08:48)  Source: .Blood Blood  Preliminary Report (11-09-22 @ 14:02):    No growth to date.    Culture - Urine (collected 11-04-22 @ 17:45)  Source: Clean Catch Clean Catch (Midstream)  Final Report (11-09-22 @ 10:38):    >100,000 CFU/ml Providencia stuartii    >100,000 CFU/ml Escherichia coli ESBL  Organism: Providencia stuartii  Escherichia coli ESBL (11-09-22 @ 10:38)  Organism: Escherichia coli ESBL (11-09-22 @ 10:38)      -  Amikacin: S <=16      -  Amoxicillin/Clavulanic Acid: S <=8/4      -  Ampicillin: R >16 These ampicillin results predict results for amoxicillin      -  Ampicillin/Sulbactam: S <=4/2 Enterobacter, Klebsiella aerogenes, Citrobacter, and Serratia may develop resistance during prolonged therapy (3-4 days)      -  Aztreonam: R 8      -  Cefazolin: R >16 For uncomplicated UTI with K. pneumoniae, E. coli, or P. mirablis: KEN <=16 is sensitive and KEN >=32 is resistant. This also predicts results for oral agents cefaclor, cefdinir, cefpodoxime, cefprozil, cefuroxime axetil, cephalexin and locarbef for uncomplicated UTI. Note that some isolates may be susceptible to these agents while testing resistant to cefazolin.      -  Cefepime: R >16      -  Cefoxitin: S <=8      -  Ceftriaxone: R >32 Enterobacter, Klebsiella aerogenes, Citrobacter, and Serratia may develop resistance during prolonged therapy      -  Ciprofloxacin: R >2      -  Ertapenem: S <=0.5      -  Gentamicin: S <=2      -  Imipenem: S <=1      -  Levofloxacin: R >4      -  Meropenem: S <=1      -  Nitrofurantoin: S <=32 Should not be used to treat pyelonephritis      -  Piperacillin/Tazobactam: S <=8      -  Tobramycin: S <=2      -  Trimethoprim/Sulfamethoxazole: S <=0.5/9.5      Method Type: KEN  Organism: Providencia stuartii (11-09-22 @ 10:38)      -  Amikacin: S <=16      -  Amoxicillin/Clavulanic Acid: R >16/8      -  Ampicillin: R >16 These ampicillin results predict results for amoxicillin      -  Ampicillin/Sulbactam: S 8/4 Enterobacter, Klebsiella aerogenes, Citrobacter, and Serratia may develop resistance during prolonged therapy (3-4 days)      -  Aztreonam: S <=4      -  Cefazolin: R >16      -  Cefepime: S <=2      -  Cefoxitin: S <=8      -  Ceftriaxone: S <=1 Enterobacter, Klebsiella aerogenes, Citrobacter, and Serratia may develop resistance during prolonged therapy      -  Ciprofloxacin: S <=0.25      -  Ertapenem: S <=0.5      -  Levofloxacin: S <=0.5      -  Meropenem: S <=1      -  Nitrofurantoin: R >64 Should not be used to treat pyelonephritis      -  Piperacillin/Tazobactam: S <=8      -  Trimethoprim/Sulfamethoxazole: S <=0.5/9.5      Method Type: KEN    Culture - Blood (collected 11-04-22 @ 15:25)  Source: .Blood Blood-Peripheral  Gram Stain (11-07-22 @ 22:37):    Growth in aerobic bottle: Gram Positive Rods  Final Report (11-08-22 @ 18:44):    Growth in aerobic bottle: Corynebacterium jeikeium "Susceptibilities not    performed"    ***Blood Panel PCR results on this specimen are available    approximately 3 hours after the Gram stain result.***    Gram stain, PCR, and/or culture results may not always    correspond due to difference in methodologies.    ************************************************************    This PCR assay was performed by multiplex PCR. This    Assay tests for 66 bacterial and resistance gene targets.    Please refer to the [facility] test directory    at [email or web address] for details.  Organism: Blood Culture PCR (11-08-22 @ 18:44)  Organism: Blood Culture PCR (11-08-22 @ 18:44)      -  Corynebacterium species: Detec      Method Type: PCR    Culture - Blood (collected 11-04-22 @ 15:15)  Source: .Blood Blood-Peripheral  Preliminary Report (11-06-22 @ 01:09):    No growth to date.

## 2022-11-09 NOTE — PROVIDER CONTACT NOTE (OTHER) - ASSESSMENT
Assessed patients skin yesterday and rash was not present  Pt skin dry flaky peeling and red all over backside
Pt resting comfortably in bed  2LNC
Unable to get a clear EKG reading as patient has Parkinsons. Multiple attempts made, different EKG machines used and no success.

## 2022-11-09 NOTE — PROGRESS NOTE ADULT - TIME BILLING
direct patient care including but not limited to reviewing chart, medications ,laboratory data, imaging reports, discussion of plan of care with consultants on the case, coordination of care with multidisciplinary team involved in the case and discussion of plan of care

## 2022-11-09 NOTE — PROVIDER CONTACT NOTE (OTHER) - SITUATION
Pt presenting with generalized rash and peeling of skin on backside
Pt on remote tele with   Per tele tech patient has A and V pacing  HR at 78

## 2022-11-10 ENCOUNTER — TRANSCRIPTION ENCOUNTER (OUTPATIENT)
Age: 72
End: 2022-11-10

## 2022-11-10 VITALS
TEMPERATURE: 99 F | HEART RATE: 87 BPM | SYSTOLIC BLOOD PRESSURE: 107 MMHG | OXYGEN SATURATION: 96 % | DIASTOLIC BLOOD PRESSURE: 64 MMHG | RESPIRATION RATE: 18 BRPM

## 2022-11-10 LAB
CULTURE RESULTS: SIGNIFICANT CHANGE UP
SPECIMEN SOURCE: SIGNIFICANT CHANGE UP

## 2022-11-10 PROCEDURE — 99239 HOSP IP/OBS DSCHRG MGMT >30: CPT

## 2022-11-10 PROCEDURE — 93970 EXTREMITY STUDY: CPT

## 2022-11-10 PROCEDURE — 85025 COMPLETE CBC W/AUTO DIFF WBC: CPT

## 2022-11-10 PROCEDURE — 80053 COMPREHEN METABOLIC PANEL: CPT

## 2022-11-10 PROCEDURE — 87186 SC STD MICRODIL/AGAR DIL: CPT

## 2022-11-10 PROCEDURE — 87040 BLOOD CULTURE FOR BACTERIA: CPT

## 2022-11-10 PROCEDURE — 96360 HYDRATION IV INFUSION INIT: CPT

## 2022-11-10 PROCEDURE — 81001 URINALYSIS AUTO W/SCOPE: CPT

## 2022-11-10 PROCEDURE — 0225U NFCT DS DNA&RNA 21 SARSCOV2: CPT

## 2022-11-10 PROCEDURE — 71045 X-RAY EXAM CHEST 1 VIEW: CPT

## 2022-11-10 PROCEDURE — 87077 CULTURE AEROBIC IDENTIFY: CPT

## 2022-11-10 PROCEDURE — 83605 ASSAY OF LACTIC ACID: CPT

## 2022-11-10 PROCEDURE — 87150 DNA/RNA AMPLIFIED PROBE: CPT

## 2022-11-10 PROCEDURE — 99285 EMERGENCY DEPT VISIT HI MDM: CPT | Mod: 25

## 2022-11-10 PROCEDURE — 82550 ASSAY OF CK (CPK): CPT

## 2022-11-10 PROCEDURE — 36415 COLL VENOUS BLD VENIPUNCTURE: CPT

## 2022-11-10 PROCEDURE — 85610 PROTHROMBIN TIME: CPT

## 2022-11-10 PROCEDURE — 85730 THROMBOPLASTIN TIME PARTIAL: CPT

## 2022-11-10 PROCEDURE — 74176 CT ABD & PELVIS W/O CONTRAST: CPT | Mod: MA

## 2022-11-10 PROCEDURE — 80048 BASIC METABOLIC PNL TOTAL CA: CPT

## 2022-11-10 PROCEDURE — 83735 ASSAY OF MAGNESIUM: CPT

## 2022-11-10 PROCEDURE — 84484 ASSAY OF TROPONIN QUANT: CPT

## 2022-11-10 PROCEDURE — 71250 CT THORAX DX C-: CPT | Mod: MA

## 2022-11-10 PROCEDURE — 87086 URINE CULTURE/COLONY COUNT: CPT

## 2022-11-10 PROCEDURE — 83880 ASSAY OF NATRIURETIC PEPTIDE: CPT

## 2022-11-10 PROCEDURE — 83690 ASSAY OF LIPASE: CPT

## 2022-11-10 RX ORDER — SODIUM HYPOCHLORITE 0.125 %
1 SOLUTION, NON-ORAL MISCELLANEOUS
Qty: 0 | Refills: 0 | DISCHARGE

## 2022-11-10 RX ORDER — CARBIDOPA AND LEVODOPA 25; 100 MG/1; MG/1
3 TABLET ORAL
Qty: 0 | Refills: 0 | DISCHARGE

## 2022-11-10 RX ORDER — MULTIVIT-MIN/FERROUS GLUCONATE 9 MG/15 ML
1 LIQUID (ML) ORAL
Qty: 0 | Refills: 0 | DISCHARGE

## 2022-11-10 RX ORDER — ONDANSETRON 8 MG/1
1 TABLET, FILM COATED ORAL
Qty: 0 | Refills: 0 | DISCHARGE

## 2022-11-10 RX ORDER — POVIDONE-IODINE 5 %
1 AEROSOL (ML) TOPICAL
Qty: 0 | Refills: 0 | DISCHARGE
Start: 2022-11-10

## 2022-11-10 RX ORDER — FAMOTIDINE 10 MG/ML
1 INJECTION INTRAVENOUS
Qty: 0 | Refills: 0 | DISCHARGE

## 2022-11-10 RX ORDER — POLYETHYLENE GLYCOL 3350 17 G/17G
17 POWDER, FOR SOLUTION ORAL
Qty: 170 | Refills: 0
Start: 2022-11-10

## 2022-11-10 RX ORDER — MIRTAZAPINE 45 MG/1
1 TABLET, ORALLY DISINTEGRATING ORAL
Qty: 30 | Refills: 0
Start: 2022-11-10

## 2022-11-10 RX ORDER — FAMOTIDINE 10 MG/ML
1 INJECTION INTRAVENOUS
Qty: 30 | Refills: 0
Start: 2022-11-10

## 2022-11-10 RX ORDER — MIRTAZAPINE 45 MG/1
1 TABLET, ORALLY DISINTEGRATING ORAL
Qty: 0 | Refills: 0 | DISCHARGE

## 2022-11-10 RX ORDER — CARBIDOPA AND LEVODOPA 25; 100 MG/1; MG/1
1 TABLET ORAL
Qty: 360 | Refills: 0
Start: 2022-11-10

## 2022-11-10 RX ORDER — MORPHINE SULFATE 50 MG/1
0.5 CAPSULE, EXTENDED RELEASE ORAL
Qty: 0 | Refills: 0 | DISCHARGE

## 2022-11-10 RX ORDER — MULTIVIT-MIN/FERROUS GLUCONATE 9 MG/15 ML
1 LIQUID (ML) ORAL
Qty: 30 | Refills: 0
Start: 2022-11-10

## 2022-11-10 RX ORDER — MAGNESIUM HYDROXIDE 400 MG/1
30 TABLET, CHEWABLE ORAL
Qty: 0 | Refills: 0 | DISCHARGE

## 2022-11-10 RX ORDER — CLOTRIMAZOLE AND BETAMETHASONE DIPROPIONATE 10; .5 MG/G; MG/G
1 CREAM TOPICAL
Qty: 0 | Refills: 0 | DISCHARGE
Start: 2022-11-10

## 2022-11-10 RX ADMIN — ONDANSETRON 4 MILLIGRAM(S): 8 TABLET, FILM COATED ORAL at 18:33

## 2022-11-10 RX ADMIN — CARBIDOPA AND LEVODOPA 1 TABLET(S): 25; 100 TABLET ORAL at 18:33

## 2022-11-10 RX ADMIN — CARBIDOPA AND LEVODOPA 1 TABLET(S): 25; 100 TABLET ORAL at 12:16

## 2022-11-10 RX ADMIN — Medication 1 APPLICATION(S): at 12:16

## 2022-11-10 RX ADMIN — FAMOTIDINE 20 MILLIGRAM(S): 10 INJECTION INTRAVENOUS at 12:15

## 2022-11-10 RX ADMIN — POLYETHYLENE GLYCOL 3350 17 GRAM(S): 17 POWDER, FOR SOLUTION ORAL at 12:16

## 2022-11-10 RX ADMIN — Medication 2.5 MILLIGRAM(S): at 14:38

## 2022-11-10 RX ADMIN — ONDANSETRON 4 MILLIGRAM(S): 8 TABLET, FILM COATED ORAL at 06:19

## 2022-11-10 RX ADMIN — MIRTAZAPINE 15 MILLIGRAM(S): 45 TABLET, ORALLY DISINTEGRATING ORAL at 12:16

## 2022-11-10 RX ADMIN — ROTIGOTINE 1 PATCH: 8 PATCH, EXTENDED RELEASE TRANSDERMAL at 12:19

## 2022-11-10 RX ADMIN — CARBIDOPA AND LEVODOPA 1 TABLET(S): 25; 100 TABLET ORAL at 14:38

## 2022-11-10 RX ADMIN — CARBIDOPA AND LEVODOPA 1 TABLET(S): 25; 100 TABLET ORAL at 06:19

## 2022-11-10 RX ADMIN — Medication 1 APPLICATION(S): at 06:19

## 2022-11-10 RX ADMIN — CARBIDOPA AND LEVODOPA 1 TABLET(S): 25; 100 TABLET ORAL at 16:16

## 2022-11-10 RX ADMIN — CARBIDOPA AND LEVODOPA 1 TABLET(S): 25; 100 TABLET ORAL at 10:43

## 2022-11-10 RX ADMIN — CARBIDOPA AND LEVODOPA 1 TABLET(S): 25; 100 TABLET ORAL at 00:25

## 2022-11-10 RX ADMIN — CLOTRIMAZOLE AND BETAMETHASONE DIPROPIONATE 1 APPLICATION(S): 10; .5 CREAM TOPICAL at 18:34

## 2022-11-10 RX ADMIN — MIDODRINE HYDROCHLORIDE 5 MILLIGRAM(S): 2.5 TABLET ORAL at 09:05

## 2022-11-10 RX ADMIN — MIDODRINE HYDROCHLORIDE 5 MILLIGRAM(S): 2.5 TABLET ORAL at 18:33

## 2022-11-10 RX ADMIN — ENOXAPARIN SODIUM 40 MILLIGRAM(S): 100 INJECTION SUBCUTANEOUS at 06:19

## 2022-11-10 RX ADMIN — ROTIGOTINE 1 PATCH: 8 PATCH, EXTENDED RELEASE TRANSDERMAL at 07:54

## 2022-11-10 RX ADMIN — Medication 2.5 MILLIGRAM(S): at 09:05

## 2022-11-10 RX ADMIN — CLOTRIMAZOLE AND BETAMETHASONE DIPROPIONATE 1 APPLICATION(S): 10; .5 CREAM TOPICAL at 06:19

## 2022-11-10 RX ADMIN — CARBIDOPA AND LEVODOPA 1 TABLET(S): 25; 100 TABLET ORAL at 09:05

## 2022-11-10 RX ADMIN — Medication 1 TABLET(S): at 12:16

## 2022-11-10 RX ADMIN — Medication 1 APPLICATION(S): at 18:34

## 2022-11-10 NOTE — DISCHARGE NOTE NURSING/CASE MANAGEMENT/SOCIAL WORK - PATIENT PORTAL LINK FT
You can access the FollowMyHealth Patient Portal offered by [facility] by registering at the following website: [email or web address]. By joining [employer]’s FollowMyHealth portal, you will also be able to view your health information using other applications (apps) compatible with our system.

## 2022-11-10 NOTE — PROGRESS NOTE ADULT - ASSESSMENT
72y Female with PMHx of terminal Parkinson's (bedbound), constipation, depression, arthritis, hx of partial colon resection presenting with clogged PEG G-tube being admitted for disconnected GJ-tube and UTI.    Acute Influenza  Acute Cystitis 2/2 CAUTI  - hx of chronic jimenez, replaced in the Ed  - most recent cx ESBL proteus on recent admission 9/2022 and treated with ertapenem and meropenem  - CT abd/pelvis showed bilateral nonobstructive calculi, staghorn in appearance, extending from the collecting systems to the renal pelvises. No hydronephrosis or kidneys. Findings are concerning for urinary bladder cystitis/urinary tract infection. Soft tissue thickening with ulceration at the sacrococcygeal region.  - UA +nitrite, mod leuk est, pH 9.0, mod blood, many bacteria  - WBC 11.1 and Lactate 2.4 -> 1.7 on admission  - s/p Aztreonam in the ED  -----   sp  tamiflu x5 days 11/4-11/9  sp meropenem started 11/4 x 3 days-UCx >100,000 CFU/ml Providencia stuartii >100,000 CFU/ml Escherichia coli ESBL    rec obs off abx  can look at dc planning    Thank you for consulting us and involving us in the management of this most interesting and challenging case.  We will follow along in the care of this patient. Please call us at [phone] or text me directly on my cell# at [phone] with any concerns.

## 2022-11-10 NOTE — CHART NOTE - NSCHARTNOTEFT_GEN_A_CORE
malnutrition follow up ( chart reviewed, events noted) brief hx:      Factors impacting intake: [ ] none [ ] nausea  [ ] vomiting [ ] diarrhea [ ] constipation  [ ]chewing problems [ ] swallowing issues  [ ] other:     Diet Prescription: Diet, NPO with Tube Feed:   Tube Feeding Modality: Gastrostomy  Jevity 1.5  Total Volume for 24 Hours (mL): 1080  Continuous  Until Goal Tube Feed Rate (mL per Hour): 60  Tube Feed Duration (in Hours): 18  Tube Feed Start Time: 16:00  Free Water Flush  Bolus   Total Volume per Flush (mL): 200   Frequency: Every 8 Hours  Pump   Rate (mL per Hour): 25   Frequency: Every Hour    Duration (Hours): 18 (11-07-22 @ 13:40)    Intake:     Current Weight: Weight (kg): 65.3 (11-04 @ 14:20)  % Weight Change    Pertinent Medications: MEDICATIONS  (STANDING):  baclofen 2.5 milliGRAM(s) Oral <User Schedule>  carbidopa/levodopa  25/100 1 Tablet(s) Oral <User Schedule>  clotrimazole/betamethasone Cream 1 Application(s) Topical two times a day  Dakins Solution - 1/4 Strength 1 Application(s) Topical two times a day  enoxaparin Injectable 40 milliGRAM(s) SubCutaneous every 24 hours  famotidine    Tablet 20 milliGRAM(s) Oral daily  influenza  Vaccine (HIGH DOSE) 0.7 milliLiter(s) IntraMuscular once  midodrine. 5 milliGRAM(s) Oral <User Schedule>  mirtazapine 15 milliGRAM(s) Oral daily  multivitamin/minerals 1 Tablet(s) Oral daily  ondansetron    Tablet 4 milliGRAM(s) Oral two times a day  polyethylene glycol 3350 17 Gram(s) Oral daily  potassium chloride  10 mEq/100 mL IVPB 10 milliEquivalent(s) IV Intermittent every 1 hour  povidone iodine 10% Solution 1 Application(s) Topical daily  rotigotine patch 4 mG/24 Hr(s) 1 Patch Transdermal every 24 hours  sodium chloride 0.45% with potassium chloride 20 mEq/L 1000 milliLiter(s) (75 mL/Hr) IV Continuous <Continuous>    MEDICATIONS  (PRN):  acetaminophen     Tablet .. 650 milliGRAM(s) Oral every 6 hours PRN Temp greater or equal to 38C (100.4F), Mild Pain (1 - 3)  bisacodyl Suppository 10 milliGRAM(s) Rectal daily PRN Constipation  magnesium hydroxide Suspension 30 milliLiter(s) Oral daily PRN Constipation  saline laxative (FLEET) Rectal Enema 1 Enema Rectal once PRN Constipation    Pertinent Labs: 11-09 Na142 mmol/L Glu 125 mg/dL<H> K+ 3.9 mmol/L Cr  0.26 mg/dL<L> BUN 11 mg/dL 11-05 Alb 2.7 g/dL<L>     CAPILLARY BLOOD GLUCOSE        Skin:     Estimated Needs:   [ ] no change since previous assessment  [ ] recalculated:     Previous Nutrition Diagnosis:   [ ] Inadequate Energy Intake [ ]Inadequate Oral Intake [ ] Excessive Energy Intake   [ ] Underweight [ ] Increased Nutrient Needs [ ] Overweight/Obesity   [ ] Altered GI Function [ ] Unintended Weight Loss [ ] Food & Nutrition Related Knowledge Deficit [ ] Malnutrition     Nutrition Diagnosis is [ ] ongoing  [ ] resolved [ ] not applicable     New Nutrition Diagnosis: [ ] not applicable       Interventions:   Recommend  [ ] Change Diet To:  [ ] Nutrition Supplement  [ ] Nutrition Support  [ ] Other:     Monitoring and Evaluation:   [ ] PO intake [ x ] Tolerance to diet prescription [ x ] weights [ x ] labs[ x ] follow up per protocol  [ ] other: malnutrition follow up ( chart reviewed, events noted) brief hx:  72y Female with PMHx of terminal Parkinson's (bedbound), constipation, depression, arthritis, hx of partial colon resection presenting with clogged PEG G-tube being admitted for disconnected GJ-tube and UTI.     Problem/Plan - 1:  ·  Problem: Encounter for gastrojejunal tube placement.   ·  Plan: Pt w/ both chronic G-tube and GJ-tube  - Presented initially with clogged G-tube which was resolved in ED, however CT noted disconnected GJ tube  - CT abd/pelvis showed G tube localized to stomach. Second G tube localized to stomach with separate catheter localized to the mid left abdomen small bowel, possibly a GJ tube that has disconnected.  - NPO with Jevity tube feeds  - Resumed tube feeds        Problem/Plan - 2:  ·  Problem: Acute UTI related to jimenez catheter  ·  Plan: Pt w/ chronic jimenez, replaced in the ED  - Pt recently found to have ESBL proteus on recent admission 9/2022 and treated with ertapenem and meropenem  - CT abd/pelvis showed bilateral nonobstructive calculi, staghorn in appearance, extending from the collecting systems to the renal pelvises. No hydronephrosis or kidneys. Findings are concerning for urinary bladder cystitis/urinary tract infection. Soft tissue thickening with ulceration at the sacrococcygeal region.   ID consult appreciated, on bactrim       Problem/Plan - 3:  ·  Problem: Influenza.   ·  Plan: Pt w/ congestive cough and noted diffuse rhonchi on physical exam, denies SOB  - RVP +influenza  - CT chest showed copious central airway secretions. Patchy left lower lobe and lingular opacities may represent combination of mucoid impaction and infection/pneumonia. Mild right basilar atelectasis. Coronary artery calcifications. Aortic valve calcif    Factors impacting intake: [ ] none [ ] nausea  [ ] vomiting [ ] diarrhea [ ] constipation  [ ]chewing problems [ ] swallowing issues  [ ] other:     Diet Prescription: Diet, NPO with Tube Feed:   Tube Feeding Modality: Gastrostomy  Jevity 1.5  Total Volume for 24 Hours (mL): 1080  Continuous  Until Goal Tube Feed Rate (mL per Hour): 60  Tube Feed Duration (in Hours): 18  Tube Feed Start Time: 16:00  Free Water Flush  Bolus   Total Volume per Flush (mL): 200   Frequency: Every 8 Hours  Pump   Rate (mL per Hour): 25   Frequency: Every Hour    Duration (Hours): 18 (11-07-22 @ 13:40)    Intake:     Current Weight: Weight (kg): 65.3 (11-04 @ 14:20)  % Weight Change    Pertinent Medications: MEDICATIONS  (STANDING):  baclofen 2.5 milliGRAM(s) Oral <User Schedule>  carbidopa/levodopa  25/100 1 Tablet(s) Oral <User Schedule>  clotrimazole/betamethasone Cream 1 Application(s) Topical two times a day  Dakins Solution - 1/4 Strength 1 Application(s) Topical two times a day  enoxaparin Injectable 40 milliGRAM(s) SubCutaneous every 24 hours  famotidine    Tablet 20 milliGRAM(s) Oral daily  influenza  Vaccine (HIGH DOSE) 0.7 milliLiter(s) IntraMuscular once  midodrine. 5 milliGRAM(s) Oral <User Schedule>  mirtazapine 15 milliGRAM(s) Oral daily  multivitamin/minerals 1 Tablet(s) Oral daily  ondansetron    Tablet 4 milliGRAM(s) Oral two times a day  polyethylene glycol 3350 17 Gram(s) Oral daily  potassium chloride  10 mEq/100 mL IVPB 10 milliEquivalent(s) IV Intermittent every 1 hour  povidone iodine 10% Solution 1 Application(s) Topical daily  rotigotine patch 4 mG/24 Hr(s) 1 Patch Transdermal every 24 hours  sodium chloride 0.45% with potassium chloride 20 mEq/L 1000 milliLiter(s) (75 mL/Hr) IV Continuous <Continuous>    MEDICATIONS  (PRN):  acetaminophen     Tablet .. 650 milliGRAM(s) Oral every 6 hours PRN Temp greater or equal to 38C (100.4F), Mild Pain (1 - 3)  bisacodyl Suppository 10 milliGRAM(s) Rectal daily PRN Constipation  magnesium hydroxide Suspension 30 milliLiter(s) Oral daily PRN Constipation  saline laxative (FLEET) Rectal Enema 1 Enema Rectal once PRN Constipation    Pertinent Labs: 11-09 Na142 mmol/L Glu 125 mg/dL<H> K+ 3.9 mmol/L Cr  0.26 mg/dL<L> BUN 11 mg/dL 11-05 Alb 2.7 g/dL<L>     CAPILLARY BLOOD GLUCOSE        Skin:     Estimated Needs:   [ ] no change since previous assessment  [ ] recalculated:     Previous Nutrition Diagnosis:   [ ] Inadequate Energy Intake [ ]Inadequate Oral Intake [ ] Excessive Energy Intake   [ ] Underweight [ ] Increased Nutrient Needs [ ] Overweight/Obesity   [ ] Altered GI Function [ ] Unintended Weight Loss [ ] Food & Nutrition Related Knowledge Deficit [ ] Malnutrition     Nutrition Diagnosis is [ ] ongoing  [ ] resolved [ ] not applicable     New Nutrition Diagnosis: [ ] not applicable       Interventions:   Recommend  [ ] Change Diet To:  [ ] Nutrition Supplement  [ ] Nutrition Support  [ ] Other:     Monitoring and Evaluation:   [ ] PO intake [ x ] Tolerance to diet prescription [ x ] weights [ x ] labs[ x ] follow up per protocol  [ ] other: malnutrition follow up ( chart reviewed, events noted) brief hx:  72y Female with PMHx of terminal Parkinson's (bedbound), constipation, depression, arthritis, hx of partial colon resection presenting with clogged PEG G-tube being admitted for disconnected GJ-tube and UTI. CT abd/pelvis showed G tube localized to stomach. Second G tube localized to stomach with separate catheter localized to the mid left abdomen small bowel, possibly a GJ tube that has disconnected.  Per surgery , monitor for passage of dislodged fragment of GJ tube. Would not operate unless pt obstructs. Pt tested + for influenza.    Per nsg, tolerating TF    Factors impacting intake: [ ] none [ ] nausea  [ ] vomiting [ ] diarrhea [ ] constipation  [ ]chewing problems [ ] swallowing issues  [ X] other: chronic tube feeder    Diet Prescription: Diet, NPO with Tube Feed:   Tube Feeding Modality: Gastrostomy  Jevity 1.5  Total Volume for 24 Hours (mL): 1080  Continuous  Until Goal Tube Feed Rate (mL per Hour): 60  Tube Feed Duration (in Hours): 18  Tube Feed Start Time: 16:00  Free Water Flush  Bolus   Total Volume per Flush (mL): 200   Frequency: Every 8 Hours  Pump   Rate (mL per Hour): 25   Frequency: Every Hour    Duration (Hours): 18 (11-07-22 @ 13:40)    Intake: TF pump study , last 72 hours, 2557l ml, expected 3240 ml, received 79% of rx                                      "    48 hours, 1804 ml, expected 2160 ml, received 84% of rx                                            24 hours, 967 ml, expected 1080 ml, received 90% of rx    Current Weight: Weight (kg): 65.3 (11-04 @ 14:20)  65.4 kg ( 11/9/22)   % Weight Change: negligible wt change    Pertinent Medications: MEDICATIONS  (STANDING):  baclofen 2.5 milliGRAM(s) Oral <User Schedule>  carbidopa/levodopa  25/100 1 Tablet(s) Oral <User Schedule>  clotrimazole/betamethasone Cream 1 Application(s) Topical two times a day  Dakins Solution - 1/4 Strength 1 Application(s) Topical two times a day  enoxaparin Injectable 40 milliGRAM(s) SubCutaneous every 24 hours  famotidine    Tablet 20 milliGRAM(s) Oral daily  influenza  Vaccine (HIGH DOSE) 0.7 milliLiter(s) IntraMuscular once  midodrine. 5 milliGRAM(s) Oral <User Schedule>  mirtazapine 15 milliGRAM(s) Oral daily  multivitamin/minerals 1 Tablet(s) Oral daily  ondansetron    Tablet 4 milliGRAM(s) Oral two times a day  polyethylene glycol 3350 17 Gram(s) Oral daily  potassium chloride  10 mEq/100 mL IVPB 10 milliEquivalent(s) IV Intermittent every 1 hour  povidone iodine 10% Solution 1 Application(s) Topical daily  rotigotine patch 4 mG/24 Hr(s) 1 Patch Transdermal every 24 hours  sodium chloride 0.45% with potassium chloride 20 mEq/L 1000 milliLiter(s) (75 mL/Hr) IV Continuous <Continuous>    MEDICATIONS  (PRN):  acetaminophen     Tablet .. 650 milliGRAM(s) Oral every 6 hours PRN Temp greater or equal to 38C (100.4F), Mild Pain (1 - 3)  bisacodyl Suppository 10 milliGRAM(s) Rectal daily PRN Constipation  magnesium hydroxide Suspension 30 milliLiter(s) Oral daily PRN Constipation  saline laxative (FLEET) Rectal Enema 1 Enema Rectal once PRN Constipation    Pertinent Labs: 11-09 Na142 mmol/L Glu 125 mg/dL<H> K+ 3.9 mmol/L Cr  0.26 mg/dL<L> BUN 11 mg/dL 11-05 Alb 2.7 g/dL<L>     CAPILLARY BLOOD GLUCOSE    Skin: stage III PI to sacrum          unstagebale R heel            Estimated Needs:   [X ] no change since previous assessment, based on 65.2 kg ( 25-30 kcals/kg) 7977-1450 kcals  [ X] recalculated: ^ to ( 1.2-1.4 gm pro/kg) 78-91 gm pro    Previous Nutrition Diagnosis:   [ ] Inadequate Energy Intake [ ]Inadequate Oral Intake [ ] Excessive Energy Intake   [ ] Underweight [ ] Increased Nutrient Needs [ ] Overweight/Obesity   [ ] Altered GI Function [ ] Unintended Weight Loss [ ] Food & Nutrition Related Knowledge Deficit [ X] Malnutrition     Nutrition Diagnosis is [X ] ongoing  [ ] resolved [ ] not applicable     New Nutrition Diagnosis: [ X] inadequate energy intake      Interventions:   Recommend  [ ] Change Diet To:  [ ] Nutrition Supplement  [X ] Nutrition Support: TF to meet high end of range of nutrient needs, Jevity 1.5 at goal rate of 75 ml/hr x 18 hours/day ( 100% will provide 2025 kcals and 86 gm pro, 1026 ml water)   [X ] Other: NC prosource 30 ml via GT daily ( 60 kcals and 15 gm pro) to help ensure adeq protein delivery     Monitoring and Evaluation:   [ ] PO intake [ x ] Tolerance to diet prescription [ x ] weights [ x ] labs[ x ] follow up per protocol  [ ] other:

## 2022-11-10 NOTE — PROGRESS NOTE ADULT - PROVIDER SPECIALTY LIST ADULT
Infectious Disease
Surgery
Gastroenterology
Hospitalist
Hospitalist
Infectious Disease
Surgery
Surgery
Gastroenterology
Gastroenterology
Hospitalist
Hospitalist
Infectious Disease
Surgery
Gastroenterology
Hospitalist
Hospitalist
Surgery
Surgery

## 2022-11-10 NOTE — PROGRESS NOTE ADULT - SUBJECTIVE AND OBJECTIVE BOX
Patient is a 72y old  Female who presents with a chief complaint of GJ tube malfunction (10 Nov 2022 14:28)      Subjective:  INTERVAL HPI/OVERNIGHT EVENTS: Patient seen and examined at bedside.  patient opens eyes upon questions but not offering any answer.   MEDICATIONS  (STANDING):  baclofen 2.5 milliGRAM(s) Oral <User Schedule>  carbidopa/levodopa  25/100 1 Tablet(s) Oral <User Schedule>  clotrimazole/betamethasone Cream 1 Application(s) Topical two times a day  Dakins Solution - 1/4 Strength 1 Application(s) Topical two times a day  enoxaparin Injectable 40 milliGRAM(s) SubCutaneous every 24 hours  famotidine    Tablet 20 milliGRAM(s) Oral daily  influenza  Vaccine (HIGH DOSE) 0.7 milliLiter(s) IntraMuscular once  midodrine. 5 milliGRAM(s) Oral <User Schedule>  mirtazapine 15 milliGRAM(s) Oral daily  multivitamin/minerals 1 Tablet(s) Oral daily  ondansetron    Tablet 4 milliGRAM(s) Oral two times a day  polyethylene glycol 3350 17 Gram(s) Oral daily  potassium chloride  10 mEq/100 mL IVPB 10 milliEquivalent(s) IV Intermittent every 1 hour  povidone iodine 10% Solution 1 Application(s) Topical daily  rotigotine patch 4 mG/24 Hr(s) 1 Patch Transdermal every 24 hours  sodium chloride 0.45% with potassium chloride 20 mEq/L 1000 milliLiter(s) (75 mL/Hr) IV Continuous <Continuous>    MEDICATIONS  (PRN):  acetaminophen     Tablet .. 650 milliGRAM(s) Oral every 6 hours PRN Temp greater or equal to 38C (100.4F), Mild Pain (1 - 3)  bisacodyl Suppository 10 milliGRAM(s) Rectal daily PRN Constipation  magnesium hydroxide Suspension 30 milliLiter(s) Oral daily PRN Constipation  saline laxative (FLEET) Rectal Enema 1 Enema Rectal once PRN Constipation      Allergies    Ceclor (Rash)  IV Contrast (Hypotension)  latex (Rash; Urticaria)    Intolerances        REVIEW OF SYSTEMS:  not able to obtain due to advanced dementia       Objective:  Vital Signs Last 24 Hrs  T(C): 37.1 (10 Nov 2022 12:01), Max: 37.1 (10 Nov 2022 12:01)  T(F): 98.7 (10 Nov 2022 12:01), Max: 98.7 (10 Nov 2022 12:01)  HR: 87 (10 Nov 2022 12:01) (82 - 91)  BP: 107/64 (10 Nov 2022 12:01) (107/64 - 146/80)  BP(mean): --  RR: 18 (10 Nov 2022 12:01) (18 - 19)  SpO2: 96% (10 Nov 2022 12:01) (96% - 99%)    Parameters below as of 10 Nov 2022 12:01  Patient On (Oxygen Delivery Method): nasal cannula  O2 Flow (L/min): 2    GENERAL: NAD, lying in bed comfortably  HEAD:  Normocephalic  EYES:  conjunctiva and sclera clear  ENT: Moist mucous membranes  NECK: Supple  CHEST/LUNG: Clear to auscultation bilaterally; No rales or rhonchi; no wheezing. Unlabored respirations  HEART: Regular rate and rhythm; S1S2+  ABDOMEN: Bowel sounds present; Soft, Nontender, Nondistended. +G tube , no leaking noticed   EXTREMITIES: bed bound, significant decrease strength.   NERVOUS SYSTEM:  Alert & Oriented X 0 , not following commend   MSK: moves all extremities occasionally but not against gravity   SKIN: skin rash on back and buttock improved, stage 4 PU.           Culture - Blood (collected 11-08-22 @ 08:48)  Source: .Blood Blood  Preliminary Report (11-09-22 @ 14:02):    No growth to date.    Culture - Urine (collected 11-04-22 @ 17:45)  Source: Clean Catch Clean Catch (Midstream)  Final Report (11-09-22 @ 10:38):    >100,000 CFU/ml Providencia stuartii    >100,000 CFU/ml Escherichia coli ESBL  Organism: Providencia stuartii  Escherichia coli ESBL (11-09-22 @ 10:38)  Organism: Escherichia coli ESBL (11-09-22 @ 10:38)      -  Amikacin: S <=16      -  Amoxicillin/Clavulanic Acid: S <=8/4      -  Ampicillin: R >16 These ampicillin results predict results for amoxicillin      -  Ampicillin/Sulbactam: S <=4/2 Enterobacter, Klebsiella aerogenes, Citrobacter, and Serratia may develop resistance during prolonged therapy (3-4 days)      -  Aztreonam: R 8      -  Cefazolin: R >16 For uncomplicated UTI with K. pneumoniae, E. coli, or P. mirablis: KEN <=16 is sensitive and KEN >=32 is resistant. This also predicts results for oral agents cefaclor, cefdinir, cefpodoxime, cefprozil, cefuroxime axetil, cephalexin and locarbef for uncomplicated UTI. Note that some isolates may be susceptible to these agents while testing resistant to cefazolin.      -  Cefepime: R >16      -  Cefoxitin: S <=8      -  Ceftriaxone: R >32 Enterobacter, Klebsiella aerogenes, Citrobacter, and Serratia may develop resistance during prolonged therapy      -  Ciprofloxacin: R >2      -  Ertapenem: S <=0.5      -  Gentamicin: S <=2      -  Imipenem: S <=1      -  Levofloxacin: R >4      -  Meropenem: S <=1      -  Nitrofurantoin: S <=32 Should not be used to treat pyelonephritis      -  Piperacillin/Tazobactam: S <=8      -  Tobramycin: S <=2      -  Trimethoprim/Sulfamethoxazole: S <=0.5/9.5      Method Type: KEN  Organism: Providencia stuartii (11-09-22 @ 10:38)      -  Amikacin: S <=16      -  Amoxicillin/Clavulanic Acid: R >16/8      -  Ampicillin: R >16 These ampicillin results predict results for amoxicillin      -  Ampicillin/Sulbactam: S 8/4 Enterobacter, Klebsiella aerogenes, Citrobacter, and Serratia may develop resistance during prolonged therapy (3-4 days)      -  Aztreonam: S <=4      -  Cefazolin: R >16      -  Cefepime: S <=2      -  Cefoxitin: S <=8      -  Ceftriaxone: S <=1 Enterobacter, Klebsiella aerogenes, Citrobacter, and Serratia may develop resistance during prolonged therapy      -  Ciprofloxacin: S <=0.25      -  Ertapenem: S <=0.5      -  Levofloxacin: S <=0.5      -  Meropenem: S <=1      -  Nitrofurantoin: R >64 Should not be used to treat pyelonephritis      -  Piperacillin/Tazobactam: S <=8      -  Trimethoprim/Sulfamethoxazole: S <=0.5/9.5      Method Type: KEN    Culture - Blood (collected 11-04-22 @ 15:25)  Source: .Blood Blood-Peripheral  Gram Stain (11-07-22 @ 22:37):    Growth in aerobic bottle: Gram Positive Rods  Final Report (11-08-22 @ 18:44):    Growth in aerobic bottle: Corynebacterium rosemaryikeium "Susceptibilities not    performed"    ***Blood Panel PCR results on this specimen are available    approximately 3 hours after the Gram stain result.***    Gram stain, PCR, and/or culture results may not always    correspond due to difference in methodologies.    ************************************************************    This PCR assay was performed by multiplex PCR. This    Assay tests for 66 bacterial and resistance gene targets.    Please refer to the [facility] test directory    at [email or web address] for details.  Organism: Blood Culture PCR (11-08-22 @ 18:44)  Organism: Blood Culture PCR (11-08-22 @ 18:44)      -  Corynebacterium species: Detec      Method Type: PCR    Culture - Blood (collected 11-04-22 @ 15:15)  Source: .Blood Blood-Peripheral  Final Report (11-10-22 @ 01:01):    No Growth Final    Plan of care discussed with family  on the phone about discharge plan ; all questions answered

## 2022-11-10 NOTE — PROGRESS NOTE ADULT - SUBJECTIVE AND OBJECTIVE BOX
[facility]  Nahid Shafer PA-C  [address] [place] [address]  [phone]      INTERVAL HPI/OVERNIGHT EVENTS:  Pt s/e  Reports no GI complaints  +peg and GJ tube    MEDICATIONS  (STANDING):  baclofen 2.5 milliGRAM(s) Oral <User Schedule>  carbidopa/levodopa  25/100 1 Tablet(s) Oral <User Schedule>  clotrimazole/betamethasone Cream 1 Application(s) Topical two times a day  Dakins Solution - 1/4 Strength 1 Application(s) Topical two times a day  enoxaparin Injectable 40 milliGRAM(s) SubCutaneous every 24 hours  famotidine    Tablet 20 milliGRAM(s) Oral daily  influenza  Vaccine (HIGH DOSE) 0.7 milliLiter(s) IntraMuscular once  midodrine. 5 milliGRAM(s) Oral <User Schedule>  mirtazapine 15 milliGRAM(s) Oral daily  multivitamin/minerals 1 Tablet(s) Oral daily  ondansetron    Tablet 4 milliGRAM(s) Oral two times a day  polyethylene glycol 3350 17 Gram(s) Oral daily  potassium chloride  10 mEq/100 mL IVPB 10 milliEquivalent(s) IV Intermittent every 1 hour  povidone iodine 10% Solution 1 Application(s) Topical daily  rotigotine patch 4 mG/24 Hr(s) 1 Patch Transdermal every 24 hours  sodium chloride 0.45% with potassium chloride 20 mEq/L 1000 milliLiter(s) (75 mL/Hr) IV Continuous <Continuous>    MEDICATIONS  (PRN):  acetaminophen     Tablet .. 650 milliGRAM(s) Oral every 6 hours PRN Temp greater or equal to 38C (100.4F), Mild Pain (1 - 3)  bisacodyl Suppository 10 milliGRAM(s) Rectal daily PRN Constipation  magnesium hydroxide Suspension 30 milliLiter(s) Oral daily PRN Constipation  saline laxative (FLEET) Rectal Enema 1 Enema Rectal once PRN Constipation      Allergies    Ceclor (Rash)  IV Contrast (Hypotension)  latex (Rash; Urticaria)      PHYSICAL EXAM:   Vital Signs:  Vital Signs Last 24 Hrs  T(C): 36.7 (10 Nov 2022 05:26), Max: 36.8 (09 Nov 2022 12:30)  T(F): 98 (10 Nov 2022 05:26), Max: 98.3 (09 Nov 2022 12:30)  HR: 88 (10 Nov 2022 09:32) (76 - 91)  BP: 146/80 (10 Nov 2022 05:26) (114/72 - 146/80)  BP(mean): --  RR: 19 (10 Nov 2022 05:26) (18 - 19)  SpO2: 98% (10 Nov 2022 05:26) (97% - 99%)    Parameters below as of 10 Nov 2022 09:32  Patient On (Oxygen Delivery Method): nasal cannula  O2 Flow (L/min): 2  O2 Concentration (%): 97  Daily     Daily     GENERAL:  Appears stated age  ABDOMEN:  Soft, non-tender, non-distended  NEURO:  Alert      LABS:                        9.8    6.34  )-----------( 346      ( 09 Nov 2022 06:40 )             32.0     11-09    142  |  107  |  11  ----------------------------<  125<H>  3.9   |  31  |  0.26<L>    Ca    8.7      09 Nov 2022 06:40

## 2022-11-10 NOTE — PROGRESS NOTE ADULT - REASON FOR ADMISSION
GJ tube malfunction

## 2022-11-10 NOTE — DISCHARGE NOTE NURSING/CASE MANAGEMENT/SOCIAL WORK - NSDCPEFALRISK_GEN_ALL_CORE
For information on Fall & Injury Prevention, visit: [email or web address]  [email or web address] OR  https://www.cdc.gov/steadi/patient.html

## 2022-11-10 NOTE — PROGRESS NOTE ADULT - SUBJECTIVE AND OBJECTIVE BOX
[facility] DIVISION of INFECTIOUS DISEASE  Cash Saini MD PhD, Mya Romero MD, Deann Zamarripa MD, Baldev Haynes MD, Chong Jhons MD  and providing coverage with Lucinda Reynoso MD  Providing Infectious Disease Consultations at [facility], [facility], [facility]'s    Office# [phone] to schedule follow up appointments  Answering Service for urgent calls or New Consults [phone]  Cell# to text for urgent issues Cash Saini [phone]     infectious diseases progress note:    MATEO LESLIE is a 72y y. o. Female patient    Overnight and events of the last 24hrs reviewed    Allergies    Ceclor (Rash)  IV Contrast (Hypotension)  latex (Rash; Urticaria)    Intolerances        ANTIBIOTICS/RELEVANT:  antimicrobials    immunologic:  influenza  Vaccine (HIGH DOSE) 0.7 milliLiter(s) IntraMuscular once    OTHER:  acetaminophen     Tablet .. 650 milliGRAM(s) Oral every 6 hours PRN  baclofen 2.5 milliGRAM(s) Oral <User Schedule>  bisacodyl Suppository 10 milliGRAM(s) Rectal daily PRN  carbidopa/levodopa  25/100 1 Tablet(s) Oral <User Schedule>  clotrimazole/betamethasone Cream 1 Application(s) Topical two times a day  Dakins Solution - 1/4 Strength 1 Application(s) Topical two times a day  enoxaparin Injectable 40 milliGRAM(s) SubCutaneous every 24 hours  famotidine    Tablet 20 milliGRAM(s) Oral daily  magnesium hydroxide Suspension 30 milliLiter(s) Oral daily PRN  midodrine. 5 milliGRAM(s) Oral <User Schedule>  mirtazapine 15 milliGRAM(s) Oral daily  multivitamin/minerals 1 Tablet(s) Oral daily  ondansetron    Tablet 4 milliGRAM(s) Oral two times a day  polyethylene glycol 3350 17 Gram(s) Oral daily  potassium chloride  10 mEq/100 mL IVPB 10 milliEquivalent(s) IV Intermittent every 1 hour  povidone iodine 10% Solution 1 Application(s) Topical daily  rotigotine patch 4 mG/24 Hr(s) 1 Patch Transdermal every 24 hours  saline laxative (FLEET) Rectal Enema 1 Enema Rectal once PRN  sodium chloride 0.45% with potassium chloride 20 mEq/L 1000 milliLiter(s) IV Continuous <Continuous>      Objective:  Vital Signs Last 24 Hrs  T(C): 37.1 (10 Nov 2022 12:01), Max: 37.1 (10 Nov 2022 12:01)  T(F): 98.7 (10 Nov 2022 12:01), Max: 98.7 (10 Nov 2022 12:01)  HR: 87 (10 Nov 2022 12:01) (82 - 91)  BP: 107/64 (10 Nov 2022 12:01) (107/64 - 146/80)  BP(mean): --  RR: 18 (10 Nov 2022 12:01) (18 - 19)  SpO2: 96% (10 Nov 2022 12:01) (96% - 99%)    Parameters below as of 10 Nov 2022 12:01  Patient On (Oxygen Delivery Method): nasal cannula        T(C): 37.1 (11-10-22 @ 12:01), Max: 37.2 (11-09-22 @ 05:10)  T(C): 37.1 (11-10-22 @ 12:01), Max: 37.2 (11-09-22 @ 05:10)  T(C): 37.1 (11-10-22 @ 12:01), Max: 37.2 (11-09-22 @ 05:10)    PHYSICAL EXAM:  HEENT: NC atraumatic  Neck: supple  Respiratory: no accessory muscle use, breathing comfortably  Cardiovascular: distant  Gastrointestinal: normal appearing, nondistended  Extremities: no clubbing, no cyanosis,        LABS:                          9.8    6.34  )-----------( 346      ( 09 Nov 2022 06:40 )             32.0       WBC  6.34 11-09 @ 06:40  5.61 11-08 @ 08:48  4.77 11-07 @ 07:00  4.20 11-06 @ 08:32  3.99 11-05 @ 05:12  4.05 11-04 @ 15:25      11-09    142  |  107  |  11  ----------------------------<  125<H>  3.9   |  31  |  0.26<L>    Ca    8.7      09 Nov 2022 06:40        Creatinine, Serum: 0.26 mg/dL (11-09-22 @ 06:40)  Creatinine, Serum: 0.27 mg/dL (11-08-22 @ 08:48)  Creatinine, Serum: <0.20 mg/dL (11-07-22 @ 07:00)  Creatinine, Serum: <0.20 mg/dL (11-06-22 @ 08:32)  Creatinine, Serum: 0.24 mg/dL (11-05-22 @ 05:12)  Creatinine, Serum: 0.36 mg/dL (11-04-22 @ 15:25)                INFLAMMATORY MARKERS      MICROBIOLOGY:              RADIOLOGY & ADDITIONAL STUDIES:

## 2022-11-10 NOTE — PROGRESS NOTE ADULT - ASSESSMENT
72y Female with PMHx of terminal Parkinson's (bedbound), constipation, depression, arthritis, hx of partial colon resection presenting with clogged PEG G-tube being admitted for disconnected GJ-tube and UTI.     Problem/Plan - 1:  ·  Problem: Encounter for gastrojejunal tube placement.   ·  Plan: Pt w/ both chronic G-tube and GJ-tube  - Presented initially with clogged G-tube which was resolved in ED, however CT noted disconnected GJ tube  - CT abd/pelvis showed G tube localized to stomach. Second G tube localized to stomach with separate catheter localized to the mid left abdomen small bowel, possibly a GJ tube that has disconnected.  - NPO with Jevity tube feeds  - Resumed tube feeds  discussed with Sx, clear for discharge         Problem/Plan - 2:  ·  Problem: Acute UTI related to jimenez catheter  treated with antibiotic      Problem/Plan - 3:  ·  Problem: Influenza.   completed tamiflu      Problem/Plan - 4:  ·  Problem: Parkinson's disease.   ·  Plan: Terminal Parkinson's disease.  Functional quadraplegia.         Problem/Plan - 5:  ·  Problem: Constipation.   ·  Plan: Chronic constipation on many home laxatives and stool softeners  - CT abd/pelvis showed moderate stool burden in the colon and rectum.   - Miralax standing  - Bisacodyl suppository PRN  - Fleet enema PRN.     Problem/Plan - 6:  ·  Problem: Depression.   ·  Plan: Chronic  - Continue home mirtazapine.

## 2022-11-10 NOTE — PROGRESS NOTE ADULT - SUBJECTIVE AND OBJECTIVE BOX
pt seen  unchanged  no gi symptoms  ICU Vital Signs Last 24 Hrs  T(C): 36.7 (10 Nov 2022 05:26), Max: 36.8 (09 Nov 2022 12:30)  T(F): 98 (10 Nov 2022 05:26), Max: 98.3 (09 Nov 2022 12:30)  HR: 88 (10 Nov 2022 09:32) (76 - 91)  BP: 146/80 (10 Nov 2022 05:26) (114/72 - 146/80)  BP(mean): --  ABP: --  ABP(mean): --  RR: 19 (10 Nov 2022 05:26) (18 - 19)  SpO2: 98% (10 Nov 2022 05:26) (97% - 99%)    O2 Parameters below as of 10 Nov 2022 09:32  Patient On (Oxygen Delivery Method): nasal cannula  O2 Flow (L/min): 2  O2 Concentration (%): 97    gen-NAD  resp-clear  abd-soft Nt/ND

## 2022-11-13 LAB
CULTURE RESULTS: SIGNIFICANT CHANGE UP
SPECIMEN SOURCE: SIGNIFICANT CHANGE UP

## 2022-11-18 NOTE — ED PROVIDER NOTE - WR ORDER ID 1
Quality 130: Documentation Of Current Medications In The Medical Record: Current Medications Documented
Quality 265: Biopsy Follow-Up: Biopsy results reviewed, communicated, tracked, and documented
Quality 431: Preventive Care And Screening: Unhealthy Alcohol Use - Screening: Patient not identified as an unhealthy alcohol user when screened for unhealthy alcohol use using a systematic screening method
Detail Level: Detailed
Quality 226: Preventive Care And Screening: Tobacco Use: Screening And Cessation Intervention: Patient screened for tobacco use and is an ex/non-smoker
[id number]

## 2023-05-05 NOTE — PATIENT PROFILE ADULT - FUNCTIONAL ASSESSMENT - DAILY ACTIVITY 4.
1 = Total assistance Advancement-Rotation Flap Text: The defect edges were debeveled with a #15 scalpel blade. Given the location of the defect, shape of the defect and the proximity to free margins an advancement-rotation flap was deemed most appropriate. Using a sterile surgical marker, an appropriate flap was drawn incorporating the defect and placing the expected incisions within the relaxed skin tension lines where possible. The area thus outlined was incised deep to adipose tissue with a #15 scalpel blade. The skin margins were undermined to an appropriate distance in all directions utilizing iris scissors. Following this, the designed flap was carried over into the primary defect and sutured into place.

## 2023-09-15 ASSESSMENT — HOOS JR
RISING FROM SITTING: MODERATE
IMPORTED HOOS JR SCORE: 55.98
HOOS JR RAW SCORE: 11
IMPORTED LATERALITY: LEFT
LYING IN BED (TURNING OVER, MAINTAINING HIP POSITION): MODERATE
BENDING TO THE FLOOR TO PICK UP OBJECT: MODERATE
GOING UP OR DOWN STAIRS: SEVERE
WALKING ON UNEVEN SURFACE: MODERATE
IMPORTED FORM: YES

## 2023-11-21 NOTE — PROGRESS NOTE ADULT - ASSESSMENT
NASAL SALINE IRRIGATION    1 cup warm water  1/4 tsp salt  Pinch of baking soda  Bulb syringe    Draw up solution into bulb syringe.    Bend over sink at 90 degree angle.  Hold breath, don't sniff.  Plug one side of nose.  Irrigate one nostril at a time.  Blow nose after irrigation.    Repeat 2 - 3 times per day.        MATEO LESLIE is a 72y Female transferred from [facility] facility of lethargy and hypoxia.   Patient is a terminal case of PD.   9/6/22 patient has fever, Blood and urine cultures in progress.  ID and pulmonary on the case

## 2023-11-24 NOTE — PHYSICAL THERAPY INITIAL EVALUATION ADULT - CRITERIA FOR SKILLED THERAPEUTIC INTERVENTIONS
same name as above same name as above same name as above same name as above impairments found/rehab potential/therapy frequency/anticipated discharge recommendation same name as above same name as above same name as above same name as above same name as above same name as above same name as above same name as above same name as above same name as above same name as above same name as above same name as above same name as above same name as above same name as above same name as above

## 2024-02-22 NOTE — OCCUPATIONAL THERAPY INITIAL EVALUATION ADULT - BALANCE TRAINING, PT EVAL
[de-identified] : Gen: NAD Head: NC/AT Eyes: no glasses, no scleral icterus ENT: mucous membranes moist CV: No JVD Lungs: nonlabored breathing Abd: soft, NT/ND Ext: full ROM in all extremities, no peripheral edema Back: +TTP in the bilateral lumbar paraspinal region, limited extension 2/2 pain Neuro: CN intact LEs +5 L +5 R hip flexion +5 L +5 R leg extension +5 L +5 R leg flexion +5 L +5 R foot dorsiflexion +5 L +5 R foot plantarflexion +5 L +5 R EHL extension Psych: normal affect Skin: no visible lesions
Pt will demonstrate improved static/dynamic balance to fair plus to improve stability, decrease fall risk and increase independence w/ ADLs within 2 weeks

## 2024-07-16 NOTE — H&P ADULT - PROBLEM SELECTOR PLAN 5
Error.    Chronic constipation on many home laxatives and stool softeners  - CT abd/pelvis showed moderate stool burden in the colon and rectum.   - Miralax standing  - MOM PRN  - Bisacodyl suppository PRN  - Fleet enema PRN

## 2024-11-15 NOTE — PATIENT PROFILE ADULT - HOME ACCESSIBILITY CONCERNS
----- Message from Dr. Liliana Girard MD sent at 11/13/2024  4:57 PM EST -----  Please let her know that her thyroid function is slightly elevated.  Will repeat TSH free T4 in 8 weeks.   
Labs faxed to [facility]  
Labs ordered  
Matti informed by Phone.   Lab: [facility]    Please order repeated lab work and she will get them completed in 8 weeks.   
none

## 2025-02-18 NOTE — DISCHARGE NOTE NURSING/CASE MANAGEMENT/SOCIAL WORK - NSPROMEDSDISPOSITION_GEN_A_NUR
This patient has been assessed with a concern for Malnutrition and has been determined to have a diagnosis/diagnoses of Moderate protein-calorie malnutrition.    This patient is being managed with:   Diet DASH/TLC-  Sodium & Cholesterol Restricted  Entered: Feb 11 2025 10:03PM   This patient has been assessed with a concern for Malnutrition and has been determined to have a diagnosis/diagnoses of Moderate protein-calorie malnutrition.    This patient is being managed with:   Diet DASH/TLC-  Sodium & Cholesterol Restricted  Entered: Feb 11 2025 10:03PM   This patient has been assessed with a concern for Malnutrition and has been determined to have a diagnosis/diagnoses of Moderate protein-calorie malnutrition.    This patient is being managed with:   Diet DASH/TLC-  Sodium & Cholesterol Restricted  Entered: Feb 11 2025 10:03PM   This patient has been assessed with a concern for Malnutrition and has been determined to have a diagnosis/diagnoses of Moderate protein-calorie malnutrition.    This patient is being managed with:   Diet DASH/TLC-  Sodium & Cholesterol Restricted  Entered: Feb 11 2025 10:03PM   bedside This patient has been assessed with a concern for Malnutrition and has been determined to have a diagnosis/diagnoses of Moderate protein-calorie malnutrition.    This patient is being managed with:   Diet DASH/TLC-  Sodium & Cholesterol Restricted  Entered: Feb 11 2025 10:03PM